# Patient Record
Sex: FEMALE | Race: WHITE | Employment: OTHER | URBAN - METROPOLITAN AREA
[De-identification: names, ages, dates, MRNs, and addresses within clinical notes are randomized per-mention and may not be internally consistent; named-entity substitution may affect disease eponyms.]

---

## 2017-03-15 ENCOUNTER — HOSPITAL ENCOUNTER (OUTPATIENT)
Dept: MAMMOGRAPHY | Age: 72
Discharge: HOME | End: 2017-03-15
Attending: FAMILY MEDICINE
Payer: MEDICARE

## 2017-03-15 DIAGNOSIS — Z12.31 VISIT FOR SCREENING MAMMOGRAM: ICD-10-CM

## 2017-03-15 PROCEDURE — 77067 SCR MAMMO BI INCL CAD: CPT

## 2017-07-21 ENCOUNTER — APPOINTMENT (OUTPATIENT)
Dept: ULTRASOUND IMAGING | Age: 72
End: 2017-07-21
Attending: PHYSICIAN ASSISTANT
Payer: MEDICARE

## 2017-07-21 ENCOUNTER — HOSPITAL ENCOUNTER (EMERGENCY)
Age: 72
Discharge: HOME | End: 2017-07-21
Attending: EMERGENCY MEDICINE
Payer: MEDICARE

## 2017-07-21 VITALS
OXYGEN SATURATION: 98 % | WEIGHT: 229.28 LBS | RESPIRATION RATE: 18 BRPM | DIASTOLIC BLOOD PRESSURE: 88 MMHG | SYSTOLIC BLOOD PRESSURE: 149 MMHG | HEART RATE: 69 BPM | TEMPERATURE: 98.2 F

## 2017-07-21 DIAGNOSIS — M79.605 LEG PAIN, INFERIOR, LEFT: Primary | ICD-10-CM

## 2017-07-21 DIAGNOSIS — T14.8XXA HEMATOMA: ICD-10-CM

## 2017-07-21 PROCEDURE — 93971 EXTREMITY STUDY: CPT

## 2017-07-21 PROCEDURE — 99282 EMERGENCY DEPT VISIT SF MDM: CPT

## 2017-07-21 RX ORDER — CHROMIUM PICOLINATE 200 MCG
TABLET ORAL
COMMUNITY
End: 2020-09-22

## 2017-07-21 RX ORDER — GLIMEPIRIDE 1 MG/1
2 TABLET ORAL
COMMUNITY

## 2017-07-21 RX ORDER — LOSARTAN POTASSIUM 50 MG/1
TABLET ORAL DAILY
COMMUNITY
End: 2022-10-24

## 2017-07-21 RX ORDER — GUAIFENESIN 100 MG/5ML
81 LIQUID (ML) ORAL DAILY
COMMUNITY

## 2017-07-21 RX ORDER — LOVASTATIN 20 MG/1
20 TABLET ORAL
COMMUNITY

## 2017-07-21 NOTE — ED PROVIDER NOTES
HPI Comments: Dc Valadez is a 80 y.o. female with no PMHx who presents ambulatory to the ED c/o mass to left lower leg with associated pain and swelling x three days. Pt reports visiting [facility] who referred pt to ED to check for blood clots. She specifically denies fever, chills, sore throat, rhinorrhea, cough, SOB, CP, HA, and N/V/D. Social hx: - Tobacco use, - EtOH use, - Illicit drug use    PCP: Sparkle Delaney MD    There are no other complaints, changes or physical findings at this time. The history is provided by the patient. No  was used. History reviewed. No pertinent past medical history. Past Surgical History:   Procedure Laterality Date    HX BREAST BIOPSY Left     40 yrs ago--neg         History reviewed. No pertinent family history. Social History     Social History    Marital status:      Spouse name: N/A    Number of children: N/A    Years of education: N/A     Occupational History    Not on file. Social History Main Topics    Smoking status: Not on file    Smokeless tobacco: Not on file    Alcohol use Not on file    Drug use: Not on file    Sexual activity: Not on file     Other Topics Concern    Not on file     Social History Narrative         ALLERGIES: Review of patient's allergies indicates not on file. Review of Systems   Constitutional: Negative. Negative for activity change, appetite change, chills, fatigue, fever and unexpected weight change. HENT: Negative. Negative for congestion, hearing loss, rhinorrhea, sneezing and voice change. Eyes: Negative. Negative for pain and visual disturbance. Respiratory: Negative. Negative for apnea, cough, choking, chest tightness and shortness of breath. Cardiovascular: Negative. Negative for chest pain and palpitations. Gastrointestinal: Negative. Negative for abdominal distention, abdominal pain, blood in stool, diarrhea, nausea and vomiting. Genitourinary: Negative. Negative for difficulty urinating, flank pain, frequency and urgency. No discharge   Musculoskeletal: Negative for arthralgias, back pain, myalgias and neck stiffness. Positive for mass to left lower leg with pain and swelling. Skin: Negative. Negative for color change and rash. Neurological: Negative. Negative for dizziness, seizures, syncope, speech difficulty, weakness, numbness and headaches. Hematological: Negative for adenopathy. Psychiatric/Behavioral: Negative. Negative for agitation, behavioral problems, dysphoric mood and suicidal ideas. The patient is not nervous/anxious. Vitals:    07/21/17 1008   BP: 149/88   Pulse: 69   Resp: 18   Temp: 98.2 °F (36.8 °C)   SpO2: 98%   Weight: 104 kg (229 lb 4.5 oz)            Physical Exam   Constitutional: She is oriented to person, place, and time. She appears well-developed and well-nourished. No distress. HENT:   Head: Normocephalic and atraumatic. Right Ear: External ear normal.   Left Ear: External ear normal.   Nose: Nose normal.   Mouth/Throat: Oropharynx is clear and moist. No oropharyngeal exudate. Eyes: Conjunctivae and EOM are normal. Pupils are equal, round, and reactive to light. Right eye exhibits no discharge. Left eye exhibits no discharge. No scleral icterus. Neck: Normal range of motion. Neck supple. No JVD present. No tracheal deviation present. Cardiovascular: Normal rate, regular rhythm, normal heart sounds and intact distal pulses. Exam reveals no gallop and no friction rub. No murmur heard. Pulmonary/Chest: Effort normal and breath sounds normal. No respiratory distress. She has no wheezes. She has no rales. She exhibits no tenderness. Abdominal: Soft. Bowel sounds are normal. She exhibits no distension and no mass. There is no tenderness. There is no rebound and no guarding. Musculoskeletal: Normal range of motion.    Tender hematoma to lateral aspect of left lower leg. Negative Rhodes's sign. No gross deformity. Lymphadenopathy:     She has no cervical adenopathy. Neurological: She is alert and oriented to person, place, and time. She has normal reflexes. No cranial nerve deficit. She exhibits normal muscle tone. Coordination normal.   Skin: Skin is warm and dry. She is not diaphoretic. Psychiatric: She has a normal mood and affect. Her behavior is normal. Judgment and thought content normal.   Nursing note and vitals reviewed. MDM  Number of Diagnoses or Management Options  Diagnosis management comments: DDx: DVT, hematoma, ecchymosis       Amount and/or Complexity of Data Reviewed  Tests in the radiology section of CPT®: reviewed and ordered  Review and summarize past medical records: yes    Patient Progress  Patient progress: stable    ED Course       Procedures      IMAGING RESULTS:    DUPLEX LOWER EXT VENOUS LEFT   Final Result     INDICATION:  sent by pmd for us r/o dvt     COMPARISON: None.     FINDINGS: Duplex Doppler sonography of the left lower extremity was performed  from the groin to the calf. The left common femoral, femoral and popliteal veins  are compressible with normal color-flow and wave forms and response to  physiologic maneuvers including Valsalva and augmentation.     IMPRESSION  IMPRESSION:  No deep venous thrombosis identified.                  MEDICATIONS GIVEN:  Medications - No data to display    IMPRESSION:  1. Leg pain, inferior, left    2. Hematoma        PLAN: Discharge home  1. Current Discharge Medication List        2. Follow-up Information     Follow up With Details Comments Contact Info    Donovan Encarnacion MD In 2 days As needed [address] [address]  [phone]          3. Return to ED if worse     DISCHARGE NOTE  11:32 AM  The patient has been re-evaluated and is ready for discharge. Reviewed available results with patient. Counseled pt on diagnosis and care plan.  Pt has expressed understanding, and all questions have been answered. Pt agrees with plan and agrees to F/U as recommended, or return to the ED if their sxs worsen. Discharge instructions have been provided and explained to the pt, along with reasons to return to the ED. This note is prepared by Patricia Pierce, acting as Scribe for [employer] STEPHANIE Petit: The scribe's documentation has been prepared under my direction and personally reviewed by me in its entirety. I confirm that the note above accurately reflects all work, treatment, procedures, and medical decision making performed by me.

## 2017-07-21 NOTE — ED NOTES
Discharge instructions reviewed with patient, copy given by [place], [place]. Pt is accomponied by family, denies use of wheelchair.

## 2017-07-21 NOTE — DISCHARGE INSTRUCTIONS
Bruises: Care Instructions  Your Care Instructions    Bruises occur when small blood vessels under the skin tear or rupture, most often from a twist, bump, or fall. Blood leaks into tissues under the skin and causes a black-and-blue spot that often turns colors, including purplish black, reddish blue, or yellowish green, as the bruise heals. Bruises hurt, but most are not serious and will go away on their own within 2 to 4 weeks. Sometimes, gravity causes them to spread down the body. A leg bruise usually will take longer to heal than a bruise on the face or arms. Follow-up care is a key part of your treatment and safety. Be sure to make and go to all appointments, and call your doctor if you are having problems. Its also a good idea to know your test results and keep a list of the medicines you take. How can you care for yourself at home? · Take pain medicines exactly as directed. ¨ If the doctor gave you a prescription medicine for pain, take it as prescribed. ¨ If you are not taking a prescription pain medicine, ask your doctor if you can take an over-the-counter medicine. · Put ice or a cold pack on the area for 10 to 20 minutes at a time. Put a thin cloth between the ice and your skin. · If you can, prop up the bruised area on pillows as much as possible for the next few days. Try to keep the bruise above the level of your heart. When should you call for help? Call your doctor now or seek immediate medical care if:  · You have signs of infection, such as:  ¨ Increased pain, swelling, warmth, or redness. ¨ Red streaks leading from the bruise. ¨ Pus draining from the bruise. ¨ A fever. · You have a bruise on your leg and signs of a blood clot, such as:  ¨ Increasing redness and swelling along with warmth, tenderness, and pain in the bruised area. ¨ Pain in your calf, back of the knee, thigh, or groin. ¨ Redness and swelling in your leg or groin. · Your pain gets worse.   Watch closely for changes in your health, and be sure to contact your doctor if:  · You do not get better as expected. Where can you learn more? Go to http://jody-ashley.info/. Enter [phone] in the search box to learn more about \"Bruises: Care Instructions. \"  Current as of: March 20, 2017  Content Version: 11.3  © 8320-5506 [employer]. Care instructions adapted under license by [employer] (which disclaims liability or warranty for this information). If you have questions about a medical condition or this instruction, always ask your healthcare professional. [place] [address] any warranty or liability for your use of this information.

## 2018-03-16 ENCOUNTER — HOSPITAL ENCOUNTER (OUTPATIENT)
Dept: MAMMOGRAPHY | Age: 73
Discharge: HOME | End: 2018-03-16
Attending: FAMILY MEDICINE
Payer: MEDICARE

## 2018-03-16 DIAGNOSIS — Z12.39 BREAST SCREENING: ICD-10-CM

## 2018-03-16 PROCEDURE — 77067 SCR MAMMO BI INCL CAD: CPT

## 2018-08-13 ENCOUNTER — HOSPITAL ENCOUNTER (OUTPATIENT)
Dept: CT IMAGING | Age: 73
Discharge: HOME | End: 2018-08-13
Attending: OTOLARYNGOLOGY
Payer: MEDICARE

## 2018-08-13 DIAGNOSIS — J32.9 CHRONIC SINUSITIS: ICD-10-CM

## 2018-08-13 PROCEDURE — 70486 CT MAXILLOFACIAL W/O DYE: CPT

## 2019-03-18 ENCOUNTER — HOSPITAL ENCOUNTER (OUTPATIENT)
Dept: MAMMOGRAPHY | Age: 74
Discharge: HOME | End: 2019-03-18
Attending: FAMILY MEDICINE
Payer: MEDICARE

## 2019-03-18 DIAGNOSIS — Z12.39 SCREENING BREAST EXAMINATION: ICD-10-CM

## 2019-03-18 PROCEDURE — 77067 SCR MAMMO BI INCL CAD: CPT

## 2020-06-04 ENCOUNTER — HOSPITAL ENCOUNTER (OUTPATIENT)
Dept: MAMMOGRAPHY | Age: 75
Discharge: HOME | End: 2020-06-04
Attending: FAMILY MEDICINE
Payer: MEDICARE

## 2020-06-04 DIAGNOSIS — Z12.31 VISIT FOR SCREENING MAMMOGRAM: ICD-10-CM

## 2020-06-04 PROCEDURE — 77067 SCR MAMMO BI INCL CAD: CPT

## 2020-09-19 ENCOUNTER — HOSPITAL ENCOUNTER (OUTPATIENT)
Dept: PREADMISSION TESTING | Age: 75
Discharge: HOME | End: 2020-09-19
Payer: MEDICARE

## 2020-09-19 PROCEDURE — 87635 SARS-COV-2 COVID-19 AMP PRB: CPT

## 2020-09-20 LAB
HEALTH STATUS, XMCV2T: NORMAL
SARS-COV-2, COV2NT: NOT DETECTED
SOURCE, COVRS: NORMAL
SPECIMEN SOURCE, FCOV2M: NORMAL
SPECIMEN TYPE, XMCV1T: NORMAL

## 2020-09-22 ENCOUNTER — ANESTHESIA EVENT (OUTPATIENT)
Dept: ENDOSCOPY | Age: 75
End: 2020-09-22
Payer: MEDICARE

## 2020-09-22 RX ORDER — FUROSEMIDE 20 MG/1
20 TABLET ORAL AS NEEDED
COMMUNITY

## 2020-09-23 ENCOUNTER — ANESTHESIA (OUTPATIENT)
Dept: ENDOSCOPY | Age: 75
End: 2020-09-23
Payer: MEDICARE

## 2020-09-23 ENCOUNTER — HOSPITAL ENCOUNTER (OUTPATIENT)
Age: 75
Setting detail: OUTPATIENT SURGERY
Discharge: HOME | End: 2020-09-23
Attending: INTERNAL MEDICINE | Admitting: INTERNAL MEDICINE
Payer: MEDICARE

## 2020-09-23 VITALS
WEIGHT: 241 LBS | BODY MASS INDEX: 41.15 KG/M2 | TEMPERATURE: 97.7 F | SYSTOLIC BLOOD PRESSURE: 131 MMHG | HEIGHT: 64 IN | DIASTOLIC BLOOD PRESSURE: 55 MMHG | HEART RATE: 61 BPM | OXYGEN SATURATION: 97 % | RESPIRATION RATE: 17 BRPM

## 2020-09-23 PROCEDURE — 77030013992 HC SNR POLYP ENDOSC BSC -B: Performed by: INTERNAL MEDICINE

## 2020-09-23 PROCEDURE — 2709999900 HC NON-CHARGEABLE SUPPLY: Performed by: INTERNAL MEDICINE

## 2020-09-23 PROCEDURE — 74011250636 HC RX REV CODE- 250/636: Performed by: REGISTERED NURSE

## 2020-09-23 PROCEDURE — 76060000031 HC ANESTHESIA FIRST 0.5 HR: Performed by: INTERNAL MEDICINE

## 2020-09-23 PROCEDURE — 88305 TISSUE EXAM BY PATHOLOGIST: CPT

## 2020-09-23 PROCEDURE — 76040000019: Performed by: INTERNAL MEDICINE

## 2020-09-23 PROCEDURE — 74011250636 HC RX REV CODE- 250/636: Performed by: INTERNAL MEDICINE

## 2020-09-23 PROCEDURE — 74011000250 HC RX REV CODE- 250: Performed by: REGISTERED NURSE

## 2020-09-23 RX ORDER — DEXTROMETHORPHAN/PSEUDOEPHED 2.5-7.5/.8
1.2 DROPS ORAL
Status: DISCONTINUED | OUTPATIENT
Start: 2020-09-23 | End: 2020-09-23 | Stop reason: HOSPADM

## 2020-09-23 RX ORDER — FLUMAZENIL 0.1 MG/ML
0.2 INJECTION INTRAVENOUS
Status: DISCONTINUED | OUTPATIENT
Start: 2020-09-23 | End: 2020-09-23 | Stop reason: HOSPADM

## 2020-09-23 RX ORDER — EPINEPHRINE 0.1 MG/ML
1 INJECTION INTRACARDIAC; INTRAVENOUS
Status: DISCONTINUED | OUTPATIENT
Start: 2020-09-23 | End: 2020-09-23 | Stop reason: HOSPADM

## 2020-09-23 RX ORDER — SODIUM CHLORIDE 9 MG/ML
25 INJECTION, SOLUTION INTRAVENOUS CONTINUOUS
Status: DISCONTINUED | OUTPATIENT
Start: 2020-09-23 | End: 2020-09-23 | Stop reason: HOSPADM

## 2020-09-23 RX ORDER — SODIUM CHLORIDE 0.9 % (FLUSH) 0.9 %
5-40 SYRINGE (ML) INJECTION AS NEEDED
Status: DISCONTINUED | OUTPATIENT
Start: 2020-09-23 | End: 2020-09-23 | Stop reason: HOSPADM

## 2020-09-23 RX ORDER — SODIUM CHLORIDE 0.9 % (FLUSH) 0.9 %
5-40 SYRINGE (ML) INJECTION EVERY 8 HOURS
Status: DISCONTINUED | OUTPATIENT
Start: 2020-09-23 | End: 2020-09-23 | Stop reason: HOSPADM

## 2020-09-23 RX ORDER — ATROPINE SULFATE 0.1 MG/ML
0.5 INJECTION INTRAVENOUS
Status: DISCONTINUED | OUTPATIENT
Start: 2020-09-23 | End: 2020-09-23 | Stop reason: HOSPADM

## 2020-09-23 RX ORDER — NALOXONE HYDROCHLORIDE 0.4 MG/ML
0.4 INJECTION, SOLUTION INTRAMUSCULAR; INTRAVENOUS; SUBCUTANEOUS
Status: DISCONTINUED | OUTPATIENT
Start: 2020-09-23 | End: 2020-09-23 | Stop reason: HOSPADM

## 2020-09-23 RX ORDER — PROPOFOL 10 MG/ML
INJECTION, EMULSION INTRAVENOUS AS NEEDED
Status: DISCONTINUED | OUTPATIENT
Start: 2020-09-23 | End: 2020-09-23 | Stop reason: HOSPADM

## 2020-09-23 RX ORDER — LIDOCAINE HYDROCHLORIDE 20 MG/ML
INJECTION, SOLUTION EPIDURAL; INFILTRATION; INTRACAUDAL; PERINEURAL AS NEEDED
Status: DISCONTINUED | OUTPATIENT
Start: 2020-09-23 | End: 2020-09-23 | Stop reason: HOSPADM

## 2020-09-23 RX ADMIN — PROPOFOL 70 MG: 10 INJECTION, EMULSION INTRAVENOUS at 09:18

## 2020-09-23 RX ADMIN — SODIUM CHLORIDE: 900 INJECTION, SOLUTION INTRAVENOUS at 09:00

## 2020-09-23 RX ADMIN — PROPOFOL 50 MG: 10 INJECTION, EMULSION INTRAVENOUS at 09:15

## 2020-09-23 RX ADMIN — PROPOFOL 30 MG: 10 INJECTION, EMULSION INTRAVENOUS at 09:26

## 2020-09-23 RX ADMIN — LIDOCAINE HYDROCHLORIDE 40 MG: 20 INJECTION, SOLUTION EPIDURAL; INFILTRATION; INTRACAUDAL; PERINEURAL at 09:04

## 2020-09-23 RX ADMIN — PROPOFOL 100 MG: 10 INJECTION, EMULSION INTRAVENOUS at 09:04

## 2020-09-23 NOTE — ANESTHESIA PREPROCEDURE EVALUATION
Relevant Problems   No relevant active problems       Anesthetic History   No history of anesthetic complications            Review of Systems / Medical History  Patient summary reviewed, nursing notes reviewed and pertinent labs reviewed    Pulmonary  Within defined limits                 Neuro/Psych   Within defined limits           Cardiovascular    Hypertension              Exercise tolerance: >4 METS     GI/Hepatic/Renal  Within defined limits           Pertinent negatives: No liver disease and renal disease   Endo/Other    Diabetes         Other Findings              Physical Exam    Airway  Mallampati: I  TM Distance: 4 - 6 cm  Neck ROM: normal range of motion, short neck        Cardiovascular  Regular rate and rhythm,  S1 and S2 normal,  no murmur, click, rub, or gallop             Dental    Dentition: Full upper dentures and Full lower dentures     Pulmonary  Breath sounds clear to auscultation               Abdominal  GI exam deferred       Other Findings            Anesthetic Plan    ASA: 2  Anesthesia type: total IV anesthesia          Induction: Intravenous  Anesthetic plan and risks discussed with: Patient

## 2020-09-23 NOTE — PROCEDURES
NAME:  Graham Whittington   :   1945   MRN:   [id number]     Date/Time:  2020 8:33 AM    Colonoscopy Operative Report    Procedure Type:  Colonoscopy with polypectomy (snare cautery)     Indications: family hx of colon polyps (her last colonoscopy 10 years ago)  Pre-operative Diagnosis: see indication above  Post-operative Diagnosis:  See findings below  :  Krystal Chavez MD  Referring Provider: Parveen [place] MD    Exam:  Airway: clear, no airway problems anticipated  Heart: RRR, without gallops or rubs  Lungs: clear bilaterally without wheezes, crackles, or rhonchi  Abdomen: soft, nontender, nondistended, bowel sounds present  Mental Status: awake, alert and oriented to person, place and time    Sedation:  MAC anesthesia Propofol  Procedure Details:  After informed consent was obtained with all risks and benefits of procedure explained and preoperative exam completed, the patient was taken to the endoscopy suite and placed in the left lateral decubitus position. Upon sequential sedation as per above, a digital rectal exam was performed demonstrating internal and external hemorrhoids. The Olympus videocolonoscope  was inserted in the rectum and carefully advanced to the cecum, which was identified by the ileocecal valve and appendiceal orifice. The quality of preparation was fair. The colonoscope was slowly withdrawn with careful evaluation between folds. Retroflexion in the rectum was completed demonstrating internal and external hemorrhoids. Findings:   ANUS: Anal exam reveals no masses but hemorrhoids, sphincter tone is normal.   RECTUM: Rectal exam reveals no masses but hemorrhoids. SIGMOID COLON: severe diverticulosis, otherwise normal.  DESCENDING COLON: severe diverticulosis, otherwise normal.  TRANSVERSE COLON:   -- 6 sessile colon polyps, sized between 0.3 cm and 0.8 cm, removed with hot snare cautery  -- moderate diverticulosis.   ASCENDING COLON: Moderate diverticulosis, otherwise normal  CECUM: The appendiceal orifice appears normal. The ileocecal valve appears normal.   TERMINAL ILEUM: The terminal ileum was not entered. Specimen Removed:  Transverse colon polyps  Complications:  None. EBL:     None. Impression:  -- 6 colon polyps, removed as above  -- pan-colonic diverticulosis  -- family history of colon polyps  -- internal and external hemorrhoids    Recommendations:   -- await pathology results  -- avoid NSAIDs x 14 days  -- high fiber diet  -- repeat colonoscopy in 2 years    Discharge Disposition:  Home in the company of a  when able to ambulate.       Vernestine Goldberg, MD

## 2020-09-23 NOTE — PERIOP NOTES
Blade Ovalle  1945  [id number]    Situation:  Verbal report received from: Jose Groves  Procedure: Procedure(s):  COLONOSCOPY  ENDOSCOPIC POLYPECTOMY    Background:    Preoperative diagnosis: HISTORY OF COLON POLYPS  Postoperative diagnosis: Diverticulosis, Colon Polyps, Hemhorroids    :  Dr. Norma Green  Assistant(s): Endoscopy Technician-1: Rory Davies  Endoscopy RN-1: Waldemar Tiwari    Specimens:   ID Type Source Tests Collected by Time Destination   1 : Transverse Colon Polyps Preservative Colon, Transverse  Ilia Merino MD 9/23/2020 0376 Pathology     H. Pylori  no    Assessment:  Intra-procedure medications   Anesthesia gave intra-procedure sedation and medications, see anesthesia flow sheet yes    Intravenous fluids: NS@ KVO     Vital signs stable     Abdominal assessment: round and soft     Recommendation:  Discharge patient per MD order.   Family or Friend   Permission to share finding with family or friend yes

## 2020-09-23 NOTE — DISCHARGE INSTRUCTIONS
Nawaf Newell  [id number]  1945    COLON DISCHARGE INSTRUCTIONS  Discomfort:  Redness at IV site- apply warm compress to area; if redness or soreness persist- contact your physician  There may be a slight amount of blood passed from the rectum  Gaseous discomfort- walking, belching will help relieve any discomfort  You may not operate a vehicle for 12 hours  You may not engage in an occupation involving machinery or appliances for rest of today  You may not drink alcoholic beverages for at least 12 hours  Avoid making any critical decisions for at least 24 hour  DIET:   High fiber diet. - however -  remember your colon is empty and a heavy meal will produce gas. Avoid these foods:  vegetables, fried / greasy foods, carbonated drinks for today  MEDICATION:  (See attached)     ACTIVITY:  You may not resume your normal daily activities until tomorrow AM; it is recommended that you spend the remainder of the day resting -  avoid any strenuous activity. CALL M.D. ANY SIGN OF:   Increasing pain, nausea, vomiting  Abdominal distension (swelling)  New increased bleeding (oral or rectal)  Fever (chills)  Pain in chest area  Bloody discharge from nose or mouth  Shortness of breath    You should NOT take any Advil, Aspirin, Ibuprofen, Motrin, Aleve, or Goodys for 14 days, ONLY  Tylenol as needed for pain. IMPRESSION:  -- SIX total colon polyps, all removed today. -- diverticulosis, which is when small out-pouchings in the inner lining of the colon form, little stretched out pockets. This is very common, and a diet high in fiber with the addition of a daily fiber supplement (like 2 teaspoons of metamucil or psyllium husk fiber powder mixed in water) can help treat this! -- we saw some hemorrhoids, which are very common, and these are swollen veins at the anal canal or end of the rectum, which can bulge with constipation and straining or frequent bowel movements.  Sometimes they cause bleeding, burning, pressure, or even pain. A diet high in fiber helps, but using a daily fiber supplement (like 2 teaspoons of psyllium husk powder or metamucil) can help treat these.     Follow-up Instructions:   Call Dr. Hyacinth Herrera for the results of procedure / biopsy in 7-10 days  Telephone # [phone]  Repeat colonoscopy in 2 years    Iván Gimenez MD

## 2020-09-23 NOTE — ANESTHESIA POSTPROCEDURE EVALUATION
Procedure(s):  COLONOSCOPY  ENDOSCOPIC POLYPECTOMY. total IV anesthesia    Anesthesia Post Evaluation      Multimodal analgesia: multimodal analgesia used between 6 hours prior to anesthesia start to PACU discharge  Patient location during evaluation: bedside  Patient participation: complete - patient participated  Level of consciousness: awake  Pain management: adequate  Airway patency: patent  Anesthetic complications: no  Cardiovascular status: acceptable  Respiratory status: acceptable  Hydration status: acceptable  Post anesthesia nausea and vomiting:  controlled  Final Post Anesthesia Temperature Assessment:  Normothermia (36.0-37.5 degrees C)      INITIAL Post-op Vital signs:   Vitals Value Taken Time   /68 9/23/2020 10:06 AM   Temp 36.5 °C (97.7 °F) 9/23/2020  9:40 AM   Pulse 55 9/23/2020 10:06 AM   Resp 15 9/23/2020 10:06 AM   SpO2 97 % 9/23/2020 10:06 AM   Vitals shown include unvalidated device data.

## 2020-09-23 NOTE — H&P
Gastroenterology Outpatient History and Physical    Patient: Clay Rodriguez    Physician: Jacob Hawley MD    Chief Complaint: family hx of colon polyps  History of Present Illness: 70 yo F with family history of colon polyps    History:  Past Medical History:   Diagnosis Date    Diabetes ([address]     Hypertension       Past Surgical History:   Procedure Laterality Date    HX BREAST BIOPSY Left     36 yrs ago--neg    HX CATARACT REMOVAL Bilateral     HX HEENT      upper lid lift    HX ORTHOPAEDIC Bilateral     carpal tunnel release    HX OTHER SURGICAL      colonoscopy      Social History     Socioeconomic History    Marital status:      Spouse name: Not on file    Number of children: Not on file    Years of education: Not on file    Highest education level: Not on file   Tobacco Use    Smoking status: Former Smoker     Packs/day: 1.00     Years: 25.00     Pack years: 25.00     Last attempt to quit: 1999     Years since quittin.0    Smokeless tobacco: Never Used   Substance and Sexual Activity    Alcohol use: Never     Frequency: Never     Comment: none in 30 years    Drug use: Never      Family History   Problem Relation Age of Onset    Diabetes Mother     Hypertension Mother     Hypertension Father     Other Father         cerebral aneurysm    Diabetes Brother     Hypertension Brother     Lung Disease Brother     Diabetes Sister     There is no problem list on file for this patient. Allergies: Allergies   Allergen Reactions    Bactrim [Sulfamethoprim] Hives and Itching     Medications:   Prior to Admission medications    Medication Sig Start Date End Date Taking? Authorizing Provider   furosemide (Lasix) 20 mg tablet Take 20 mg by mouth as needed. Yes Provider, Law   losartan (COZAAR) 50 mg tablet Take  by mouth daily. Yes Other, MD Mare   lovastatin (MEVACOR) 20 mg tablet Take 20 mg by mouth nightly.    Yes Other, MD Mare   glimepiride (AMARYL) 1 mg tablet Take 2 mg by mouth every morning. Yes Francisco J, MD Mare   aspirin 81 mg chewable tablet Take 81 mg by mouth daily. Yes Francisco J, MD Mare   DOCOSAHEXANOIC ACID/EPA (FISH OIL PO) Take 1 Cap by mouth two (2) times a day. Yes Francisco J, MD Mare     Physical Exam:   Vital Signs: There were no vitals taken for this visit.   General: well developed, well nourished   HEENT: unremarkable   Heart: regular rhythm no mumur    Lungs: clear   Abdominal:  benign   Neurological: unremarkable   Extremities: no edema     Findings/Diagnosis: family hx of colon polyps  Plan of Care/Planned Procedure: colonoscopy with conscious/deep sedation    Signed:  Bentley Brumfield MD 9/23/2020

## 2020-09-23 NOTE — PROGRESS NOTES
Endoscope was pre-cleaned at the bedside immediately following procedure by Tyler Black. Anesthesia reports 250mg Propofol, 40mg Lidocaine and 500mL NS given during procedure. Received report from anesthesia staff on vital signs and status of patient.

## 2020-10-16 ENCOUNTER — TRANSCRIBE ORDER (OUTPATIENT)
Dept: SCHEDULING | Age: 75
End: 2020-10-16

## 2020-10-22 ENCOUNTER — HOSPITAL ENCOUNTER (OUTPATIENT)
Dept: ULTRASOUND IMAGING | Age: 75
Discharge: HOME | End: 2020-10-22
Attending: FAMILY MEDICINE
Payer: MEDICARE

## 2020-10-22 PROCEDURE — 76700 US EXAM ABDOM COMPLETE: CPT

## 2021-05-13 ENCOUNTER — TRANSCRIBE ORDER (OUTPATIENT)
Dept: SCHEDULING | Age: 76
End: 2021-05-13

## 2021-05-13 DIAGNOSIS — Z12.31 VISIT FOR SCREENING MAMMOGRAM: Primary | ICD-10-CM

## 2021-06-08 ENCOUNTER — HOSPITAL ENCOUNTER (OUTPATIENT)
Dept: MAMMOGRAPHY | Age: 76
Discharge: HOME | End: 2021-06-08
Attending: FAMILY MEDICINE
Payer: MEDICARE

## 2021-06-08 DIAGNOSIS — Z12.31 VISIT FOR SCREENING MAMMOGRAM: ICD-10-CM

## 2021-06-08 PROCEDURE — 77063 BREAST TOMOSYNTHESIS BI: CPT

## 2022-05-16 ENCOUNTER — TRANSCRIBE ORDER (OUTPATIENT)
Dept: SCHEDULING | Age: 77
End: 2022-05-16

## 2022-05-16 DIAGNOSIS — Z12.31 SCREENING MAMMOGRAM FOR HIGH-RISK PATIENT: Primary | ICD-10-CM

## 2022-06-10 ENCOUNTER — HOSPITAL ENCOUNTER (OUTPATIENT)
Dept: MAMMOGRAPHY | Age: 77
Discharge: HOME | End: 2022-06-10
Attending: FAMILY MEDICINE
Payer: MEDICARE

## 2022-06-10 DIAGNOSIS — Z12.31 SCREENING MAMMOGRAM FOR HIGH-RISK PATIENT: ICD-10-CM

## 2022-06-10 PROCEDURE — 77063 BREAST TOMOSYNTHESIS BI: CPT

## 2022-09-29 RX ORDER — METFORMIN HYDROCHLORIDE 1000 MG/1
1000 TABLET ORAL 2 TIMES DAILY WITH MEALS
COMMUNITY

## 2022-09-30 ENCOUNTER — ANESTHESIA EVENT (OUTPATIENT)
Dept: ENDOSCOPY | Age: 77
End: 2022-09-30
Payer: MEDICARE

## 2022-09-30 ENCOUNTER — ANESTHESIA (OUTPATIENT)
Dept: ENDOSCOPY | Age: 77
End: 2022-09-30
Payer: MEDICARE

## 2022-09-30 ENCOUNTER — HOSPITAL ENCOUNTER (OUTPATIENT)
Age: 77
Setting detail: OUTPATIENT SURGERY
Discharge: HOME | End: 2022-09-30
Attending: INTERNAL MEDICINE | Admitting: INTERNAL MEDICINE
Payer: MEDICARE

## 2022-09-30 VITALS
RESPIRATION RATE: 19 BRPM | BODY MASS INDEX: 40.36 KG/M2 | OXYGEN SATURATION: 97 % | DIASTOLIC BLOOD PRESSURE: 67 MMHG | HEART RATE: 74 BPM | WEIGHT: 227.8 LBS | HEIGHT: 63 IN | TEMPERATURE: 98.9 F | SYSTOLIC BLOOD PRESSURE: 167 MMHG

## 2022-09-30 PROCEDURE — 74011000250 HC RX REV CODE- 250: Performed by: ANESTHESIOLOGY

## 2022-09-30 PROCEDURE — 76040000019: Performed by: INTERNAL MEDICINE

## 2022-09-30 PROCEDURE — 74011250636 HC RX REV CODE- 250/636: Performed by: INTERNAL MEDICINE

## 2022-09-30 PROCEDURE — 74011250637 HC RX REV CODE- 250/637: Performed by: INTERNAL MEDICINE

## 2022-09-30 PROCEDURE — 76060000031 HC ANESTHESIA FIRST 0.5 HR: Performed by: INTERNAL MEDICINE

## 2022-09-30 PROCEDURE — 2709999900 HC NON-CHARGEABLE SUPPLY: Performed by: INTERNAL MEDICINE

## 2022-09-30 PROCEDURE — 74011250636 HC RX REV CODE- 250/636: Performed by: ANESTHESIOLOGY

## 2022-09-30 RX ORDER — NALOXONE HYDROCHLORIDE 0.4 MG/ML
0.4 INJECTION, SOLUTION INTRAMUSCULAR; INTRAVENOUS; SUBCUTANEOUS
Status: DISCONTINUED | OUTPATIENT
Start: 2022-09-30 | End: 2022-09-30 | Stop reason: HOSPADM

## 2022-09-30 RX ORDER — DIPHENHYDRAMINE HYDROCHLORIDE 50 MG/ML
50 INJECTION, SOLUTION INTRAMUSCULAR; INTRAVENOUS ONCE
Status: DISCONTINUED | OUTPATIENT
Start: 2022-09-30 | End: 2022-09-30 | Stop reason: HOSPADM

## 2022-09-30 RX ORDER — CALCIUM CARBONATE/VITAMIN D3 600 MG-125
TABLET ORAL DAILY
COMMUNITY

## 2022-09-30 RX ORDER — ATROPINE SULFATE 0.1 MG/ML
0.5 INJECTION INTRAVENOUS
Status: DISCONTINUED | OUTPATIENT
Start: 2022-09-30 | End: 2022-09-30 | Stop reason: HOSPADM

## 2022-09-30 RX ORDER — DEXTROMETHORPHAN/PSEUDOEPHED 2.5-7.5/.8
1.2 DROPS ORAL
Status: DISCONTINUED | OUTPATIENT
Start: 2022-09-30 | End: 2022-09-30 | Stop reason: HOSPADM

## 2022-09-30 RX ORDER — EPINEPHRINE 0.1 MG/ML
1 INJECTION INTRACARDIAC; INTRAVENOUS
Status: DISCONTINUED | OUTPATIENT
Start: 2022-09-30 | End: 2022-09-30 | Stop reason: HOSPADM

## 2022-09-30 RX ORDER — PROPOFOL 10 MG/ML
INJECTION, EMULSION INTRAVENOUS AS NEEDED
Status: DISCONTINUED | OUTPATIENT
Start: 2022-09-30 | End: 2022-09-30 | Stop reason: HOSPADM

## 2022-09-30 RX ORDER — LIDOCAINE HYDROCHLORIDE 20 MG/ML
INJECTION, SOLUTION EPIDURAL; INFILTRATION; INTRACAUDAL; PERINEURAL AS NEEDED
Status: DISCONTINUED | OUTPATIENT
Start: 2022-09-30 | End: 2022-09-30 | Stop reason: HOSPADM

## 2022-09-30 RX ORDER — FLUMAZENIL 0.1 MG/ML
0.2 INJECTION INTRAVENOUS
Status: DISCONTINUED | OUTPATIENT
Start: 2022-09-30 | End: 2022-09-30 | Stop reason: HOSPADM

## 2022-09-30 RX ORDER — SODIUM CHLORIDE 9 MG/ML
125 INJECTION, SOLUTION INTRAVENOUS CONTINUOUS
Status: DISCONTINUED | OUTPATIENT
Start: 2022-09-30 | End: 2022-09-30 | Stop reason: HOSPADM

## 2022-09-30 RX ORDER — MIDAZOLAM HYDROCHLORIDE 1 MG/ML
.25-5 INJECTION, SOLUTION INTRAMUSCULAR; INTRAVENOUS
Status: DISCONTINUED | OUTPATIENT
Start: 2022-09-30 | End: 2022-09-30 | Stop reason: HOSPADM

## 2022-09-30 RX ADMIN — SODIUM CHLORIDE 125 ML/HR: 9 INJECTION, SOLUTION INTRAVENOUS at 13:19

## 2022-09-30 RX ADMIN — LIDOCAINE HYDROCHLORIDE 40 MG: 20 INJECTION, SOLUTION EPIDURAL; INFILTRATION; INTRACAUDAL; PERINEURAL at 13:22

## 2022-09-30 RX ADMIN — SIMETHICONE 80 MG: 20 SUSPENSION/ DROPS ORAL at 13:35

## 2022-09-30 RX ADMIN — PROPOFOL 220 MG: 10 INJECTION, EMULSION INTRAVENOUS at 13:44

## 2022-09-30 NOTE — PERIOP NOTES
Endoscopy Case End Note:    9040:  Procedure scope was pre-cleaned, per protocol, at bedside by MONICA ALFARO      0390:  Report received from anesthesia - Dr. Becky Gross. See anesthesia flowsheet for intra-procedure vital signs and events.

## 2022-09-30 NOTE — ANESTHESIA POSTPROCEDURE EVALUATION
Procedure(s):  COLONOSCOPY.    total IV anesthesia    Anesthesia Post Evaluation        Patient location during evaluation: PACU  Note status: Adequate. Level of consciousness: responsive to verbal stimuli and sleepy but conscious  Pain management: satisfactory to patient  Airway patency: patent  Anesthetic complications: no  Cardiovascular status: acceptable  Respiratory status: acceptable  Hydration status: acceptable  Comments: +Post-Anesthesia Evaluation and Assessment    Patient: Dunia Crawley MRN: [id number]  SSN: [id number]   YOB: 1945  Age: 77 y.o. Sex: female      Cardiovascular Function/Vital Signs    BP (!) 167/67   Pulse 74   Temp 37.2 °C (98.9 °F)   Resp 19   Ht 5' 3\" (1.6 m)   Wt 103.3 kg (227 lb 12.8 oz)   SpO2 97%   Breastfeeding No   BMI 40.35 kg/m²     Patient is status post Procedure(s):  COLONOSCOPY. Nausea/Vomiting: Controlled. Postoperative hydration reviewed and adequate. Pain:  Pain Scale 1: Numeric (0 - 10) (09/30/22 1356)  Pain Intensity 1: 0 (09/30/22 1356)   Managed. Neurological Status: At baseline. Mental Status and Level of Consciousness: Arousable. Pulmonary Status:   O2 Device: None (Room air) (09/30/22 1356)   Adequate oxygenation and airway patent. Complications related to anesthesia: None    Post-anesthesia assessment completed. No concerns. Signed By: Cliff Murry DO    9/30/2022  Post anesthesia nausea and vomiting:  controlled      INITIAL Post-op Vital signs:   Vitals Value Taken Time   /67 09/30/22 1411   Temp 37.2 °C (98.9 °F) 09/30/22 1356   Pulse 69 09/30/22 1410   Resp 22 09/30/22 1409   SpO2 96 % 09/30/22 1409   Vitals shown include unvalidated device data.

## 2022-09-30 NOTE — PROCEDURES
Colonoscopy Procedure Note    Indications:   See Preoperative Diagnosis above  Referring Physician: Hamlet Thompson MD  Anesthesia/Sedation: MAC anesthesia Propofol  Endoscopist:  Dr. Sarah Harrington  Assistant:  Endoscopy Technician-1: Jeannie Rm  Endoscopy RN-1: Mamie Duverney C  Preoperative diagnosis: Personal history of colonic polyps [Z86.010]  Postoperative diagnosis: Diverticulosis    Procedure in Detail:  Informed consent was obtained for the procedure, including sedation. Risks of perforation, hemorrhage, adverse drug reaction, and aspiration were discussed. The patient was placed in the left lateral decubitus position. Based on the pre-procedure assessment, including review of the patient's medical history, medications, allergies, and review of systems, she had been deemed to be an appropriate candidate for moderate sedation; she was therefore sedated with the medications listed above. The patient was monitored continuously with ECG tracing, pulse oximetry, blood pressure monitoring, and direct observations. A rectal examination was performed. The [id number] was inserted into the rectum and advanced under direct vision to the cecum, which was identified by the ileocecal valve and appendiceal orifice. The quality of the colonic preparation was good BPS 2/3/3 8. A careful inspection was made as the colonoscope was withdrawn, including a retroflexed view of the rectum; findings and interventions are described below. Appropriate photodocumentation was obtained.     Findings:  ARGELIA: unremarkable  Rectum: normal  no mucosal lesion appreciated  Sigmoid: diverticulosis, otherwise unremarkable  Descending Colon: normal  no mucosal lesion appreciated  Transverse Colon: normal  no mucosal lesion appreciated  Ascending Colon: normal  no mucosal lesion appreciated  Cecum: normal  no mucosal lesion appreciated  Terminal Ileum: not intubated    Specimens:    none    EBL: None    Complications: None; patient tolerated the procedure well. Recommendations:     - Repeat colonoscopy NOT recommended unless very healthy, next would be in 10 years when she is 81years old. - attempted to call [facility] [phone] no answer, no VM left due to no personalized VM message.       Signed By: Quita Diaz MD                        September 30, 2022

## 2022-09-30 NOTE — ANESTHESIA PREPROCEDURE EVALUATION
Relevant Problems   No relevant active problems       Anesthetic History   No history of anesthetic complications            Review of Systems / Medical History  Patient summary reviewed, nursing notes reviewed and pertinent labs reviewed    Pulmonary  Within defined limits                 Neuro/Psych   Within defined limits           Cardiovascular    Hypertension              Exercise tolerance: >4 METS     GI/Hepatic/Renal  Within defined limits           Pertinent negatives: No liver disease and renal disease   Endo/Other    Diabetes    Obesity     Other Findings              Physical Exam    Airway  Mallampati: I  TM Distance: 4 - 6 cm  Neck ROM: normal range of motion, short neck        Cardiovascular  Regular rate and rhythm,  S1 and S2 normal,  no murmur, click, rub, or gallop             Dental    Dentition: Full upper dentures and Full lower dentures     Pulmonary  Breath sounds clear to auscultation               Abdominal  GI exam deferred       Other Findings            Anesthetic Plan    ASA: 3  Anesthesia type: total IV anesthesia and general          Induction: Intravenous  Anesthetic plan and risks discussed with: Patient      Propofol MAC/Supernova prn.

## 2022-09-30 NOTE — H&P
[facility]  Outpatient History and Physical    Patient: Ariadne Lobo    Physician: Estefany Glover MD    Vital Signs: not currently breastfeeding. Allergies: Allergies   Allergen Reactions    Bactrim [Sulfamethoprim] Hives and Itching       Chief Complaint: high risk screening personal hx/o colon polyps. History:  Past Medical History:   Diagnosis Date    Diabetes ([address]     Hypertension       Past Surgical History:   Procedure Laterality Date    COLONOSCOPY N/A 2020    COLONOSCOPY performed by Sabine Orourke MD at [facility] ENDOSCOPY    COLONOSCOPY,ASHANTI AVINA,SNARE  2020         HX BREAST BIOPSY Left     40 yrs ago--neg    HX CATARACT REMOVAL Bilateral     HX HEENT      upper lid lift    HX ORTHOPAEDIC Bilateral     carpal tunnel release    HX OTHER SURGICAL      colonoscopy      Social History     Socioeconomic History    Marital status:    Tobacco Use    Smoking status: Former     Packs/day: 1.00     Years: 25.00     Pack years: 25.00     Types: Cigarettes     Quit date: 1999     Years since quittin.0    Smokeless tobacco: Never   Substance and Sexual Activity    Alcohol use: Never     Comment: none in 30 years    Drug use: Never      Family History   Problem Relation Age of Onset    Diabetes Mother     Hypertension Mother     Hypertension Father     Other Father         cerebral aneurysm    Diabetes Brother     Hypertension Brother     Lung Disease Brother     Diabetes Sister        Medications:   Prior to Admission medications    Medication Sig Start Date End Date Taking? Authorizing Provider   metFORMIN (GLUCOPHAGE) 1,000 mg tablet Take 1,000 mg by mouth two (2) times daily (with meals). Yes Provider, Historical   furosemide (LASIX) 20 mg tablet Take 20 mg by mouth as needed. Yes Provider, Historical   losartan (COZAAR) 50 mg tablet Take  by mouth daily. Yes Other, MD Mare   lovastatin (MEVACOR) 20 mg tablet Take 20 mg by mouth nightly. Yes Francisco J, MD Mare   glimepiride (AMARYL) 1 mg tablet Take 2 mg by mouth every morning. Yes Mare Marx MD   aspirin 81 mg chewable tablet Take 81 mg by mouth daily. Yes Mare Marx MD   DOCOSAHEXANOIC ACID/EPA (FISH OIL PO) Take 1 Cap by mouth two (2) times a day. Yes Mare Marx MD   calcium-cholecalciferol, d3, (CALCIUM 600 + D) 600-125 mg-unit tab Take  by mouth daily. Provider, Historical       Physical Exam:   General: alert, no distress   HEENT: Head: Normocephalic, no lesions, without obvious abnormality. Heart: regular rate and rhythm, S1, S2 normal, no murmur, click, rub or gallop   Lungs: chest clear, no wheezing, rales, normal symmetric air entry   Abdominal: Bowel sounds are normal, liver is not enlarged, spleen is not enlarged   Neurological: Grossly normal   Extremities: extremities normal, atraumatic, no cyanosis or edema     Plan of Care/Planned Procedure: colonoscopy    The heart, lungs and mental status were satisfactory for the administration of MAC sedation and for the procedure. Informed consent was obtained for the procedure, including sedation. Risks of perforation, hemorrhage, adverse drug reaction, and aspiration were discussed. The risks, benefits and alternatives were again reiterated to the patient to include the risk of infection, bleeding, medication reaction, aspiration, perforation which could require immediate surgery, cardiopulmonary complication, issues with anesthesia and death. The patient was counseled at length about the risks of bal Covid-19 in the maynor-operative and post-operative states including the recovery window of their procedure. The patient was made aware that bal Covid-19 after a surgical procedure may worsen their prognosis for recovering from the virus and lend to a higher morbidity and or mortality risk. The patient was given the options of postponing their procedure.  All of the risks, benefits, and alternatives were discussed. The patient does  wish to proceed with the procedure.

## 2022-09-30 NOTE — DISCHARGE INSTRUCTIONS
Emily Round  [id number]  1945    It was my pleasure seeing you for your procedure. You will also receive a summary report with the findings from this procedure and any further recommendations. If you had polyps removed or biopsies taken during your procedure, you will receive a separate letter from me within the next 2 weeks. If you don't receive this letter or if you have any questions, please call my office [phone]. Please take note of the post procedure instructions listed below. Best Wishes,    Dr. Gamal Contreras    These instructions give you information on caring for yourself after your procedure. Call your doctor if you have any problems or questions after your procedure. HOME CARE  Walk if you have belly cramping or gas. Walking will help get rid of the air and reduce the bloated feeling in your belly (abdomen). Your IV site (where you received drugs) may be tender to touch. Place warm towels on the site; keep your arm up on two pillows if you have any swelling or soreness in the area. You may shower. ACTIVITY:  Take frequent rest periods and move at a slower pace for the next 24 hours. .  You may resume your regular activity tomorrow if you are feeling back to normal.  Do not drive or ride a bicycle for at least 24 hours (because of the medicine (anesthesia) used during the test). Do not sign any important legal documents or use or operate any machinery for 24 hours  Do not take sleeping medicines/nerve drugs for 24 hours unless the doctor tells you. You can return to work/school tomorrow unless otherwise instructed. NUTRITION:  Drink plenty of fluids to keep your pee (urine) clear or pale yellow  Begin with a light meal and progress to your normal diet. Heavy or fried foods are harder to digest and may make you feel sick to your stomach (nauseated).   Once you are feeling back to normal, you may resume your normal diet as instructed by your doctor. Avoid alcoholic beverages for 24 hours or as instructed. IF YOU HAD BIOPSIES TAKEN OR POLYPS REMOVED DURING THE PROCEDURE:  For the next 7 days, avoid all non-steroidal antiinflammatory medications such as Ibuprofen, Motrin, Advil, Alleve, Ladonna-seltzer, Goody's powder, BC powder. If you do not have an heart condition that requires you to take a daily aspirin, you should avoid taking aspirin for 7 days. Eat a soft diet for 24 hours. Monitor your stools for any blood or dark black, tar-like, stools as this may be a sign of bleeding and if you see any blood, notify your doctor immediately. GET HELP RIGHT AWAY AND SEEK IMMEDIATE MEDICAL CARE IF:  You have more than a spotting of blood in your stool. You pass clumps of tissue (blood clots) or fill the toilet with blood. Your belly is painfully swollen or puffy (abdominal distention). You throw up (vomit). You have a fever. You have redness, pain or swelling at the IV site that last greater than two days. You have abdominal pain or discomfort that is severe or gets worse throughout the day. Post-procedure diagnosis:  Diverticulosis    Post-procedure recommendations:          Learning About Coronavirus (COVID-19)  Coronavirus [phone]): Overview  What is coronavirus (COVID-19)? The coronavirus disease (COVID-19) is caused by a virus. It is an illness that was first found in [place], [place], in December 2019. It has since spread worldwide. The virus can cause fever, cough, and trouble breathing. In severe cases, it can cause pneumonia and make it hard to breathe without help. It can cause death. Coronaviruses are a large group of viruses. They cause the common cold. They also cause more serious illnesses like Middle East respiratory syndrome (MERS) and severe acute respiratory syndrome (SARS). COVID-19 is caused by a novel coronavirus. That means it's a new type that has not been seen in people before.   This virus spreads person-to-person through droplets from coughing and sneezing. It can also spread when you are close to someone who is infected. And it can spread when you touch something that has the virus on it, such as a doorknob or a tabletop. What can you do to protect yourself from coronavirus (COVID-19)? The best way to protect yourself from getting sick is to: Avoid areas where there is an outbreak. Avoid contact with people who may be infected. Wash your hands often with soap or alcohol-based hand sanitizers. Avoid crowds and try to stay at least 6 feet away from other people. Wash your hands often, especially after you cough or sneeze. Use soap and water, and scrub for at least 20 seconds. If soap and water aren't available, use an alcohol-based hand . Avoid touching your mouth, nose, and eyes. What can you do to avoid spreading the virus to others? To help avoid spreading the virus to others:  Cover your mouth with a tissue when you cough or sneeze. Then throw the tissue in the trash. Use a disinfectant to clean things that you touch often. Stay home if you are sick or have been exposed to the virus. Don't go to school, work, or public areas. And don't use public transportation. If you are sick:  Leave your home only if you need to get medical care. But call the doctor's office first so they know you're coming. And wear a face mask, if you have one. If you have a face mask, wear it whenever you're around other people. It can help stop the spread of the virus when you cough or sneeze. Clean and disinfect your home every day. Use household  and disinfectant wipes or sprays. Take special care to clean things that you grab with your hands. These include doorknobs, remote controls, phones, and handles on your refrigerator and microwave. And don't forget countertops, tabletops, bathrooms, and computer keyboards.   When to call for help  Call 911 anytime you think you may need emergency care. For example, call if:  You have severe trouble breathing. (You can't talk at all.)  You have constant chest pain or pressure. You are severely dizzy or lightheaded. You are confused or can't think clearly. Your face and lips have a blue color. You pass out (lose consciousness) or are very hard to wake up. Call your doctor now if you develop symptoms such as:  Shortness of breath. Fever. Cough. If you need to get care, call ahead to the doctor's office for instructions before you go. Make sure you wear a face mask, if you have one, to prevent exposing other people to the virus. Where can you get the latest information? The following health organizations are tracking and studying this virus. Their websites contain the most up-to-date information. Karena Zavalaabigail also learn what to do if you think you may have been exposed to the virus. U.S. Centers for Disease Control and Prevention (CDC): The CDC provides updated news about the disease and travel advice. The website also tells you how to prevent the spread of infection. www.cdc.gov  World Health Organization [facility]): WHO offers information about the virus outbreaks. WHO also has travel advice. www.who.int  Current as of: April 1, 2020               Content Version: 12.4  © 2006-2020 [employer]. Care instructions adapted under license by your healthcare professional. If you have questions about a medical condition or this instruction, always ask your healthcare professional. [address] any warranty or liability for your use of this information.           Patient Education on Sedation / Analgesia Administered for Procedure      For 24 hours after general anesthesia or intravenous analgesia / sedation:  Have someone responsible help you with your care  Limit your activities  Do not drive and operate hazardous machinery  Do not make important personal, legal or business decisions  Do not drink alcoholic beverages  If you have not urinated within 8 hours after discharge, please contact your physician  Resume your medications unless otherwise instructed    For 24 hours after general anesthesia or intravenous analgesia / sedation  you may experience:  Drowsiness, dizziness, sleepiness, or confusion  Difficulty remembering or delayed reaction times  Difficulty with your balance, especially while walking, move slowly and carefully, do not make sudden position changes  Difficulty focusing or blurred vision    You may not be aware of slight changes in your behavior and/or your reaction time because of the medication used during and after your procedure.     Report the following to your physician:  Excessive pain, swelling, redness or odor of or around the surgical area  Temperature over 100.5  Nausea and vomiting lasting longer than 4 hours or if unable to take medications  Any signs of decreased circulation or nerve impairment to extremity: change in color, persistent numbness, tingling, coldness or increase pain  Any questions or concerns    IF YOU REPORT TO AN EMERGENCY ROOM, DOCTOR'S OFFICE OR HOSPITAL WITHIN 24 HOURS AFTER YOUR PROCEDURE, BRING THIS SHEET AND YOUR AFTER VISIT SUMMARY WITH YOU AND GIVE IT TO THE PHYSICIAN OR NURSE ATTENDING YOU.

## 2022-09-30 NOTE — ROUTINE PROCESS
TRANSFER - IN REPORT:    Verbal report received from Al(name) on Sarah Haines  being received from Endo(unit) for routine progression of care      Report consisted of patients Situation, Background, Assessment and   Recommendations(SBAR). Information from the following report(s) SBAR, Procedure Summary, and MAR was reviewed with the receiving nurse. Opportunity for questions and clarification was provided. Assessment completed upon patients arrival to unit and care assumed.

## 2022-10-17 ENCOUNTER — APPOINTMENT (OUTPATIENT)
Dept: CT IMAGING | Age: 77
DRG: 871 | End: 2022-10-17
Attending: EMERGENCY MEDICINE
Payer: MEDICARE

## 2022-10-17 ENCOUNTER — HOSPITAL ENCOUNTER (INPATIENT)
Age: 77
LOS: 7 days | Discharge: HOME HEALTH | DRG: 871 | End: 2022-10-24
Attending: EMERGENCY MEDICINE | Admitting: STUDENT IN AN ORGANIZED HEALTH CARE EDUCATION/TRAINING PROGRAM
Payer: MEDICARE

## 2022-10-17 ENCOUNTER — APPOINTMENT (OUTPATIENT)
Dept: GENERAL RADIOLOGY | Age: 77
DRG: 871 | End: 2022-10-17
Attending: EMERGENCY MEDICINE
Payer: MEDICARE

## 2022-10-17 DIAGNOSIS — D72.825 BANDEMIA: ICD-10-CM

## 2022-10-17 DIAGNOSIS — E11.8 CONTROLLED DIABETES MELLITUS TYPE 2 WITH COMPLICATIONS, UNSPECIFIED WHETHER LONG TERM INSULIN USE (HCC): ICD-10-CM

## 2022-10-17 DIAGNOSIS — R06.02 SHORTNESS OF BREATH: ICD-10-CM

## 2022-10-17 DIAGNOSIS — I21.4 NSTEMI (NON-ST ELEVATED MYOCARDIAL INFARCTION) (HCC): Primary | ICD-10-CM

## 2022-10-17 DIAGNOSIS — A31.9 ATYPICAL MYCOBACTERIAL INFECTION: ICD-10-CM

## 2022-10-17 DIAGNOSIS — J15.9 PNEUMONIA, BACTERIAL: ICD-10-CM

## 2022-10-17 DIAGNOSIS — E66.01 MORBID OBESITY (HCC): ICD-10-CM

## 2022-10-17 DIAGNOSIS — I21.4 NON-STEMI (NON-ST ELEVATED MYOCARDIAL INFARCTION) (HCC): ICD-10-CM

## 2022-10-17 DIAGNOSIS — J96.01 ACUTE RESPIRATORY FAILURE WITH HYPOXIA (HCC): ICD-10-CM

## 2022-10-17 LAB
ALBUMIN SERPL-MCNC: 4 G/DL (ref 3.5–5)
ALBUMIN/GLOB SERPL: 1.1 {RATIO} (ref 1.1–2.2)
ALP SERPL-CCNC: 76 U/L (ref 45–117)
ALT SERPL-CCNC: 50 U/L (ref 12–78)
ANION GAP SERPL CALC-SCNC: 10 MMOL/L (ref 5–15)
APTT PPP: 23.2 SEC (ref 22.1–31)
APTT PPP: 23.9 SEC (ref 22.1–31)
APTT PPP: 23.9 SEC (ref 22.1–31)
AST SERPL-CCNC: 40 U/L (ref 15–37)
BASOPHILS # BLD: 0.1 K/UL (ref 0–0.1)
BASOPHILS # BLD: 0.1 K/UL (ref 0–0.1)
BASOPHILS NFR BLD: 0 % (ref 0–1)
BASOPHILS NFR BLD: 0 % (ref 0–1)
BILIRUB SERPL-MCNC: 0.4 MG/DL (ref 0.2–1)
BNP SERPL-MCNC: 248 PG/ML
BUN SERPL-MCNC: 18 MG/DL (ref 6–20)
BUN/CREAT SERPL: 21 (ref 12–20)
CALCIUM SERPL-MCNC: 10.9 MG/DL (ref 8.5–10.1)
CHLORIDE SERPL-SCNC: 102 MMOL/L (ref 97–108)
CO2 SERPL-SCNC: 24 MMOL/L (ref 21–32)
COVID-19 RAPID TEST, COVR: NOT DETECTED
CREAT SERPL-MCNC: 0.87 MG/DL (ref 0.55–1.02)
DIFFERENTIAL METHOD BLD: ABNORMAL
DIFFERENTIAL METHOD BLD: ABNORMAL
EOSINOPHIL # BLD: 0.1 K/UL (ref 0–0.4)
EOSINOPHIL # BLD: 0.2 K/UL (ref 0–0.4)
EOSINOPHIL NFR BLD: 0 % (ref 0–7)
EOSINOPHIL NFR BLD: 1 % (ref 0–7)
ERYTHROCYTE [DISTWIDTH] IN BLOOD BY AUTOMATED COUNT: 14.6 % (ref 11.5–14.5)
ERYTHROCYTE [DISTWIDTH] IN BLOOD BY AUTOMATED COUNT: 15 % (ref 11.5–14.5)
GLOBULIN SER CALC-MCNC: 3.8 G/DL (ref 2–4)
GLUCOSE SERPL-MCNC: 161 MG/DL (ref 65–100)
HCT VFR BLD AUTO: 35.4 % (ref 35–47)
HCT VFR BLD AUTO: 37.1 % (ref 35–47)
HGB BLD-MCNC: 10.8 G/DL (ref 11.5–16)
HGB BLD-MCNC: 10.9 G/DL (ref 11.5–16)
IMM GRANULOCYTES # BLD AUTO: 0.1 K/UL (ref 0–0.04)
IMM GRANULOCYTES # BLD AUTO: 0.1 K/UL (ref 0–0.04)
IMM GRANULOCYTES NFR BLD AUTO: 0 % (ref 0–0.5)
IMM GRANULOCYTES NFR BLD AUTO: 1 % (ref 0–0.5)
INR PPP: 1 (ref 0.9–1.1)
LYMPHOCYTES # BLD: 2.4 K/UL (ref 0.8–3.5)
LYMPHOCYTES # BLD: 2.9 K/UL (ref 0.8–3.5)
LYMPHOCYTES NFR BLD: 15 % (ref 12–49)
LYMPHOCYTES NFR BLD: 16 % (ref 12–49)
MCH RBC QN AUTO: 24.6 PG (ref 26–34)
MCH RBC QN AUTO: 25.2 PG (ref 26–34)
MCHC RBC AUTO-ENTMCNC: 29.4 G/DL (ref 30–36.5)
MCHC RBC AUTO-ENTMCNC: 30.5 G/DL (ref 30–36.5)
MCV RBC AUTO: 82.5 FL (ref 80–99)
MCV RBC AUTO: 83.7 FL (ref 80–99)
MONOCYTES # BLD: 0.9 K/UL (ref 0–1)
MONOCYTES # BLD: 1.1 K/UL (ref 0–1)
MONOCYTES NFR BLD: 6 % (ref 5–13)
MONOCYTES NFR BLD: 6 % (ref 5–13)
NEUTS SEG # BLD: 12.4 K/UL (ref 1.8–8)
NEUTS SEG # BLD: 13.8 K/UL (ref 1.8–8)
NEUTS SEG NFR BLD: 77 % (ref 32–75)
NEUTS SEG NFR BLD: 78 % (ref 32–75)
NRBC # BLD: 0 K/UL (ref 0–0.01)
NRBC # BLD: 0 K/UL (ref 0–0.01)
NRBC BLD-RTO: 0 PER 100 WBC
NRBC BLD-RTO: 0 PER 100 WBC
PLATELET # BLD AUTO: 308 K/UL (ref 150–400)
PLATELET # BLD AUTO: 359 K/UL (ref 150–400)
PMV BLD AUTO: 10.5 FL (ref 8.9–12.9)
PMV BLD AUTO: 10.7 FL (ref 8.9–12.9)
POTASSIUM SERPL-SCNC: 4.1 MMOL/L (ref 3.5–5.1)
PROT SERPL-MCNC: 7.8 G/DL (ref 6.4–8.2)
PROTHROMBIN TIME: 10.3 SEC (ref 9–11.1)
RBC # BLD AUTO: 4.29 M/UL (ref 3.8–5.2)
RBC # BLD AUTO: 4.43 M/UL (ref 3.8–5.2)
SODIUM SERPL-SCNC: 136 MMOL/L (ref 136–145)
SOURCE, COVRS: NORMAL
THERAPEUTIC RANGE,PTTT: NORMAL SECS (ref 58–77)
TROPONIN-HIGH SENSITIVITY: 1746 NG/L (ref 0–51)
TROPONIN-HIGH SENSITIVITY: 1978 NG/L (ref 0–51)
WBC # BLD AUTO: 15.9 K/UL (ref 3.6–11)
WBC # BLD AUTO: 18.1 K/UL (ref 3.6–11)

## 2022-10-17 PROCEDURE — 80053 COMPREHEN METABOLIC PANEL: CPT

## 2022-10-17 PROCEDURE — 85730 THROMBOPLASTIN TIME PARTIAL: CPT

## 2022-10-17 PROCEDURE — 71275 CT ANGIOGRAPHY CHEST: CPT

## 2022-10-17 PROCEDURE — 80061 LIPID PANEL: CPT

## 2022-10-17 PROCEDURE — 84439 ASSAY OF FREE THYROXINE: CPT

## 2022-10-17 PROCEDURE — 93005 ELECTROCARDIOGRAM TRACING: CPT

## 2022-10-17 PROCEDURE — 36415 COLL VENOUS BLD VENIPUNCTURE: CPT

## 2022-10-17 PROCEDURE — 96376 TX/PRO/DX INJ SAME DRUG ADON: CPT

## 2022-10-17 PROCEDURE — 83036 HEMOGLOBIN GLYCOSYLATED A1C: CPT

## 2022-10-17 PROCEDURE — 99285 EMERGENCY DEPT VISIT HI MDM: CPT

## 2022-10-17 PROCEDURE — 87635 SARS-COV-2 COVID-19 AMP PRB: CPT

## 2022-10-17 PROCEDURE — 85025 COMPLETE CBC W/AUTO DIFF WBC: CPT

## 2022-10-17 PROCEDURE — 74011250637 HC RX REV CODE- 250/637: Performed by: STUDENT IN AN ORGANIZED HEALTH CARE EDUCATION/TRAINING PROGRAM

## 2022-10-17 PROCEDURE — 71046 X-RAY EXAM CHEST 2 VIEWS: CPT

## 2022-10-17 PROCEDURE — 83880 ASSAY OF NATRIURETIC PEPTIDE: CPT

## 2022-10-17 PROCEDURE — 84484 ASSAY OF TROPONIN QUANT: CPT

## 2022-10-17 PROCEDURE — 74011000636 HC RX REV CODE- 636: Performed by: EMERGENCY MEDICINE

## 2022-10-17 PROCEDURE — 74011000250 HC RX REV CODE- 250: Performed by: STUDENT IN AN ORGANIZED HEALTH CARE EDUCATION/TRAINING PROGRAM

## 2022-10-17 PROCEDURE — 96365 THER/PROPH/DIAG IV INF INIT: CPT

## 2022-10-17 PROCEDURE — 84100 ASSAY OF PHOSPHORUS: CPT

## 2022-10-17 PROCEDURE — 65270000046 HC RM TELEMETRY

## 2022-10-17 PROCEDURE — 85610 PROTHROMBIN TIME: CPT

## 2022-10-17 PROCEDURE — 74011250636 HC RX REV CODE- 250/636: Performed by: EMERGENCY MEDICINE

## 2022-10-17 PROCEDURE — 84443 ASSAY THYROID STIM HORMONE: CPT

## 2022-10-17 PROCEDURE — 83735 ASSAY OF MAGNESIUM: CPT

## 2022-10-17 PROCEDURE — 74011250636 HC RX REV CODE- 250/636: Performed by: STUDENT IN AN ORGANIZED HEALTH CARE EDUCATION/TRAINING PROGRAM

## 2022-10-17 RX ORDER — ASPIRIN 325 MG
325 TABLET ORAL ONCE
Status: COMPLETED | OUTPATIENT
Start: 2022-10-17 | End: 2022-10-17

## 2022-10-17 RX ORDER — SODIUM CHLORIDE 9 MG/ML
75 INJECTION, SOLUTION INTRAVENOUS CONTINUOUS
Status: DISPENSED | OUTPATIENT
Start: 2022-10-17 | End: 2022-10-18

## 2022-10-17 RX ORDER — HEPARIN SODIUM 1000 [USP'U]/ML
2000 INJECTION, SOLUTION INTRAVENOUS; SUBCUTANEOUS AS NEEDED
Status: DISCONTINUED | OUTPATIENT
Start: 2022-10-17 | End: 2022-10-24

## 2022-10-17 RX ORDER — HEPARIN SODIUM 1000 [USP'U]/ML
4000 INJECTION, SOLUTION INTRAVENOUS; SUBCUTANEOUS AS NEEDED
Status: DISCONTINUED | OUTPATIENT
Start: 2022-10-17 | End: 2022-10-24

## 2022-10-17 RX ORDER — HEPARIN SODIUM 10000 [USP'U]/100ML
9.5-25 INJECTION, SOLUTION INTRAVENOUS
Status: DISCONTINUED | OUTPATIENT
Start: 2022-10-17 | End: 2022-10-22

## 2022-10-17 RX ORDER — NITROGLYCERIN 0.4 MG/1
0.4 TABLET SUBLINGUAL AS NEEDED
Status: DISCONTINUED | OUTPATIENT
Start: 2022-10-17 | End: 2022-10-24 | Stop reason: HOSPADM

## 2022-10-17 RX ORDER — CARVEDILOL 3.12 MG/1
3.12 TABLET ORAL 2 TIMES DAILY WITH MEALS
Status: DISCONTINUED | OUTPATIENT
Start: 2022-10-18 | End: 2022-10-24 | Stop reason: HOSPADM

## 2022-10-17 RX ORDER — GUAIFENESIN 100 MG/5ML
81 LIQUID (ML) ORAL DAILY
Status: DISCONTINUED | OUTPATIENT
Start: 2022-10-18 | End: 2022-10-24 | Stop reason: HOSPADM

## 2022-10-17 RX ORDER — MORPHINE SULFATE 2 MG/ML
1 INJECTION, SOLUTION INTRAMUSCULAR; INTRAVENOUS
Status: DISCONTINUED | OUTPATIENT
Start: 2022-10-17 | End: 2022-10-24 | Stop reason: HOSPADM

## 2022-10-17 RX ORDER — HEPARIN SODIUM 1000 [USP'U]/ML
4000 INJECTION, SOLUTION INTRAVENOUS; SUBCUTANEOUS ONCE
Status: COMPLETED | OUTPATIENT
Start: 2022-10-17 | End: 2022-10-17

## 2022-10-17 RX ADMIN — IOPAMIDOL 70 ML: 755 INJECTION, SOLUTION INTRAVENOUS at 17:28

## 2022-10-17 RX ADMIN — HEPARIN SODIUM 4000 UNITS: 1000 INJECTION INTRAVENOUS; SUBCUTANEOUS at 17:35

## 2022-10-17 RX ADMIN — HEPARIN SODIUM 9.5 UNITS/KG/HR: 10000 INJECTION, SOLUTION INTRAVENOUS at 17:49

## 2022-10-17 RX ADMIN — FAMOTIDINE 20 MG: 10 INJECTION, SOLUTION INTRAVENOUS at 21:22

## 2022-10-17 RX ADMIN — ASPIRIN 325 MG ORAL TABLET 325 MG: 325 PILL ORAL at 21:22

## 2022-10-17 RX ADMIN — SODIUM CHLORIDE 75 ML/HR: 9 INJECTION, SOLUTION INTRAVENOUS at 21:22

## 2022-10-17 NOTE — ED PROVIDER NOTES
EMERGENCY DEPARTMENT HISTORY AND PHYSICAL EXAM      Date: 10/17/2022  Patient Name: Jennifer Hunter    History of Presenting Illness     Chief Complaint   Patient presents with    Shortness of Breath     Pt ambulatory into triage with a cc of shortness of breath and cough that started this am        History Provided By: Patient    HPI: Jennifer Hunter, 69 y.o. female with PMHx significant for diabetes, hypertension, who presents with a chief complaint of cough and shortness of breath. Patient reports she has had a cough and URI symptoms for the last week which she attributed to allergies. She was at her primary care doctor on Thursday and was told to continue using Flonase and Zyrtec. She tells me she became acutely more short of breath today prompting her trip to the emergency department. She denies any associated shortness of breath or fever. Denies any known sick contacts. No abdominal pain, nausea, vomiting. Denies any cardiac history. PCP: Gayle Cade MD    There are no other associated signs or symptoms. No other exacerbating or alleviating factors. There are no other complaints, changes, or physical findings at this time.     Current Facility-Administered Medications   Medication Dose Route Frequency Provider Last Rate Last Admin    heparin 25,000 units in D5W 250 ml infusion  9.5-25 Units/kg/hr IntraVENous TITRATE Carmen Mendoza MD 9.9 mL/hr at 10/17/22 1749 9.5 Units/kg/hr at 10/17/22 1749    heparin (porcine) 1,000 unit/mL injection 2,000 Units  2,000 Units IntraVENous PRN Carmen Mendoza MD        Or    heparin (porcine) 1,000 unit/mL injection 4,000 Units  4,000 Units IntraVENous PRN Carmen Mendoza MD        morphine injection 1 mg  1 mg IntraVENous Q4H PRN Demar Gonzalez DO        [START ON 10/18/2022] aspirin chewable tablet 81 mg  81 mg Oral DAILY Demar Gonzalez DO        nitroglycerin (NITROSTAT) tablet 0.4 mg  0.4 mg SubLINGual PRN Brooks Zheng Isidro DO        [START ON 10/18/2022] carvediloL (COREG) tablet 3.125 mg  3.125 mg Oral BID WITH MEALS Isidro Vang DO        famotidine (PF) (PEPCID) 20 mg in 0.9% sodium chloride 10 mL injection  20 mg IntraVENous Q12H JesusIsidro DO   20 mg at 10/17/22 2122    0.9% sodium chloride infusion  75 mL/hr IntraVENous CONTINUOUS Earney Gemma, DO 75 mL/hr at 10/17/22 2122 75 mL/hr at 10/17/22 2122     Past History     Past Medical History:  Past Medical History:   Diagnosis Date    Diabetes ([facility] [address]     Hypertension      Past Surgical History:  Past Surgical History:   Procedure Laterality Date    COLONOSCOPY N/A 2020    COL[facility] Patrick [facility]    COLONOSCOPY N/A 2022    COLONOSCOPY performed [facility] Paulo Boss [facility] [address]  2020         HX BREAST BIOPSY Left     36 yrs ago--neg    HX CATARACT REMOVAL Bilateral     HX HEENT      upper lid lift    HX ORTHOPAEDIC Bilateral     carpal tunnel release    HX OTHER SURGICAL      colonoscopy     Family History:  Family History   Problem Relation Age of Onset    Diabetes Mother     Hypertension Mother     Hypertension Father     Other Father         cerebral aneurysm    Diabetes Brother     Hypertension Brother     Lung Disease Brother     Diabetes Sister      Social History:  Social History     Tobacco Use    Smoking status: Former     Packs/day: 1.00     Years: 25.00     Pack years: 25.00     Types: Cigarettes     Quit date: 1999     Years since quittin.0    Smokeless tobacco: Never   Substance Use Topics    Alcohol use: Never     Comment: none in 30 years    Drug use: Never     Allergies: Allergies   Allergen Reactions    Bactrim [Sulfamethoprim] Hives and Itching     Review of Systems   Review of Systems   Constitutional:  Negative for chills and fever. HENT:  Negative for congestion, rhinorrhea and sore throat.     Respiratory: Positive for shortness of breath. Negative for cough. Cardiovascular:  Negative for chest pain. Gastrointestinal:  Negative for abdominal pain, nausea and vomiting. Genitourinary:  Negative for dysuria and urgency. Skin:  Negative for rash. Neurological:  Negative for dizziness, light-headedness and headaches. All other systems reviewed and are negative. Physical Exam   Physical Exam  Vitals and nursing note reviewed. Constitutional:       General: She is not in acute distress. Appearance: She is well-developed. HENT:      Head: Normocephalic and atraumatic. Eyes:      Conjunctiva/sclera: Conjunctivae normal.      Pupils: Pupils are equal, round, and reactive to light. Cardiovascular:      Rate and Rhythm: Regular rhythm. Tachycardia present. Pulmonary:      Effort: Pulmonary effort is normal. No respiratory distress. Breath sounds: Normal breath sounds. No stridor. Abdominal:      General: There is no distension. Palpations: Abdomen is soft. Tenderness: There is no abdominal tenderness. Musculoskeletal:         General: Normal range of motion. Cervical back: Normal range of motion. Skin:     General: Skin is warm and dry. Neurological:      Mental Status: She is alert and oriented to person, place, and time. Psychiatric:         Mood and Affect: Mood normal.         Thought Content:  Thought content normal.     Diagnostic Study Results   Labs -     Recent Results (from the past 12 hour(s))   CBC WITH AUTOMATED DIFF    Collection Time: 10/17/22  2:27 PM   Result Value Ref Range    WBC 18.1 (H) 3.6 - 11.0 K/uL    RBC 4.43 3.80 - 5.20 M/uL    HGB 10.9 (L) 11.5 - 16.0 g/dL    HCT 37.1 35.0 - 47.0 %    MCV 83.7 80.0 - 99.0 FL    MCH 24.6 (L) 26.0 - 34.0 PG    MCHC 29.4 (L) 30.0 - 36.5 g/dL    RDW 15.0 (H) 11.5 - 14.5 %    PLATELET 148 624 - 001 K/uL    MPV 10.7 8.9 - 12.9 FL    NRBC 0.0 0  WBC    ABSOLUTE NRBC 0.00 0.00 - 0.01 K/uL    NEUTROPHILS 77 (H) 32 - 75 %    LYMPHOCYTES 16 12 - 49 %    MONOCYTES 6 5 - 13 %    EOSINOPHILS 1 0 - 7 %    BASOPHILS 0 0 - 1 %    IMMATURE GRANULOCYTES 0 0.0 - 0.5 %    ABS. NEUTROPHILS 13.8 (H) 1.8 - 8.0 K/UL    ABS. LYMPHOCYTES 2.9 0.8 - 3.5 K/UL    ABS. MONOCYTES 1.1 (H) 0.0 - 1.0 K/UL    ABS. EOSINOPHILS 0.2 0.0 - 0.4 K/UL    ABS. BASOPHILS 0.1 0.0 - 0.1 K/UL    ABS. IMM. GRANS. 0.1 (H) 0.00 - 0.04 K/UL    DF AUTOMATED     METABOLIC PANEL, COMPREHENSIVE    Collection Time: 10/17/22  2:27 PM   Result Value Ref Range    Sodium 136 136 - 145 mmol/L    Potassium 4.1 3.5 - 5.1 mmol/L    Chloride 102 97 - 108 mmol/L    CO2 24 21 - 32 mmol/L    Anion gap 10 5 - 15 mmol/L    Glucose 161 (H) 65 - 100 mg/dL    BUN 18 6 - 20 MG/DL    Creatinine 0.87 0.55 - 1.02 MG/DL    BUN/Creatinine ratio 21 (H) 12 - 20      eGFR >60 >60 ml/min/1.73m2    Calcium 10.9 (H) 8.5 - 10.1 MG/DL    Bilirubin, total 0.4 0.2 - 1.0 MG/DL    ALT (SGPT) 50 12 - 78 U/L    AST (SGOT) 40 (H) 15 - 37 U/L    Alk.  phosphatase 76 45 - 117 U/L    Protein, total 7.8 6.4 - 8.2 g/dL    Albumin 4.0 3.5 - 5.0 g/dL    Globulin 3.8 2.0 - 4.0 g/dL    A-G Ratio 1.1 1.1 - 2.2     TROPONIN-HIGH SENSITIVITY    Collection Time: 10/17/22  2:27 PM   Result Value Ref Range    Troponin-High Sensitivity 1,746 (HH) 0 - 51 ng/L   NT-PRO BNP    Collection Time: 10/17/22  2:27 PM   Result Value Ref Range    NT pro- <450 PG/ML   PROTHROMBIN TIME + INR    Collection Time: 10/17/22  4:01 PM   Result Value Ref Range    INR 1.0 0.9 - 1.1      Prothrombin time 10.3 9.0 - 11.1 sec   PTT    Collection Time: 10/17/22  4:01 PM   Result Value Ref Range    aPTT 23.9 22.1 - 31.0 sec    aPTT, therapeutic range     58.0 - 77.0 SECS   TROPONIN-HIGH SENSITIVITY    Collection Time: 10/17/22  5:04 PM   Result Value Ref Range    Troponin-High Sensitivity 1,978 (HH) 0 - 51 ng/L   PTT    Collection Time: 10/17/22  5:04 PM   Result Value Ref Range    aPTT 23.2 22.1 - 31.0 sec    aPTT, therapeutic range     58.0 - 77.0 SECS   PTT    Collection Time: 10/17/22  5:08 PM   Result Value Ref Range    aPTT 23.9 22.1 - 31.0 sec    aPTT, therapeutic range     58.0 - 77.0 SECS   CBC WITH AUTOMATED DIFF    Collection Time: 10/17/22  5:08 PM   Result Value Ref Range    WBC 15.9 (H) 3.6 - 11.0 K/uL    RBC 4.29 3.80 - 5.20 M/uL    HGB 10.8 (L) 11.5 - 16.0 g/dL    HCT 35.4 35.0 - 47.0 %    MCV 82.5 80.0 - 99.0 FL    MCH 25.2 (L) 26.0 - 34.0 PG    MCHC 30.5 30.0 - 36.5 g/dL    RDW 14.6 (H) 11.5 - 14.5 %    PLATELET 233 516 - 516 K/uL    MPV 10.5 8.9 - 12.9 FL    NRBC 0.0 0  WBC    ABSOLUTE NRBC 0.00 0.00 - 0.01 K/uL    NEUTROPHILS 78 (H) 32 - 75 %    LYMPHOCYTES 15 12 - 49 %    MONOCYTES 6 5 - 13 %    EOSINOPHILS 0 0 - 7 %    BASOPHILS 0 0 - 1 %    IMMATURE GRANULOCYTES 1 (H) 0.0 - 0.5 %    ABS. NEUTROPHILS 12.4 (H) 1.8 - 8.0 K/UL    ABS. LYMPHOCYTES 2.4 0.8 - 3.5 K/UL    ABS. MONOCYTES 0.9 0.0 - 1.0 K/UL    ABS. EOSINOPHILS 0.1 0.0 - 0.4 K/UL    ABS. BASOPHILS 0.1 0.0 - 0.1 K/UL    ABS. IMM. GRANS. 0.1 (H) 0.00 - 0.04 K/UL    DF AUTOMATED     COVID-19 RAPID TEST    Collection Time: 10/17/22  7:18 PM   Result Value Ref Range    Specimen source Nasopharyngeal      COVID-19 rapid test Not detected NOTD         Radiologic Studies -   CTA CHEST W OR W WO CONT   Final Result   Limited exam   1. No visualized pulmonary embolus. 2. Centrilobular emphysema. 3. Possible interstitial prominence which could represent vascular congestion. 4. Query micronodularity in the lingula which may represent an atypical   infectious process such as mycobacterium. 5. Left thyroid nodule measuring up to 4.5 cm. Recommend nonemergent ultrasound   for further evaluation if not already performed at an outside facility. 6. Incidental findings as above. XR CHEST PA LAT   Final Result   Diffuse bilateral increased interstitial markings, likely   representing interstitial edema.             XR CHEST PA LAT    Result Date: 10/17/2022  Diffuse bilateral increased interstitial markings, likely representing interstitial edema. CTA CHEST W OR W WO CONT    Result Date: 10/17/2022  Limited exam 1. No visualized pulmonary embolus. 2. Centrilobular emphysema. 3. Possible interstitial prominence which could represent vascular congestion. 4. Query micronodularity in the lingula which may represent an atypical infectious process such as mycobacterium. 5. Left thyroid nodule measuring up to 4.5 cm. Recommend nonemergent ultrasound for further evaluation if not already performed at an outside facility. 6. Incidental findings as above. Medical Decision Making   I am the first provider for this patient. I reviewed the vital signs, available nursing notes, past medical history, past surgical history, family history and social history. Vital Signs-Reviewed the patient's vital signs. Patient Vitals for the past 12 hrs:   Temp Pulse Resp BP SpO2   10/17/22 2030 98.7 °F (37.1 °C) 98 24 129/77 --   10/17/22 1930 98.4 °F (36.9 °C) (!) 102 20 124/81 92 %   10/17/22 1917 -- 99 15 124/81 92 %   10/17/22 1817 -- 99 23 127/69 94 %   10/17/22 1745 -- (!) 101 16 107/71 92 %   10/17/22 1647 -- (!) 103 23 (!) 130/92 94 %   10/17/22 1617 -- (!) 112 21 127/89 91 %   10/17/22 1547 -- (!) 107 19 108/82 94 %   10/17/22 1545 -- -- -- -- 93 %   10/17/22 1417 98.9 °F (37.2 °C) (!) 130 18 (!) 148/83 92 %       Pulse Oximetry Analysis - 92% on 2L    Cardiac Monitor:   Rate: 115 bpm  Rhythm: Sinus Tachycardia        Records Reviewed: Nursing Notes and Old Medical Records    Provider Notes (Medical Decision Making):   Patient presents with a chief complaint of shortness of breath. On presentation she was tachycardic to the 120s to 130s. Satting in the high 80s to low 90s on room air. Placed on 2 L nasal cannula. On my evaluation she is able to speak in complete sentences and appears comfortable. Differential includes pneumonia, PE, CHF, COVID-19.   Plan for basic lab work, CTA of the chest, troponin, BNP, COVID swab. ED Course:   Initial assessment performed. The patients presenting problems have been discussed, and they are in agreement with the care plan formulated and outlined with them. I have encouraged them to ask questions as they arise throughout their visit. CTA does not show any evidence of pulmonary embolism. Troponin returned elevated at 1700. Patient placed on heparin drip. Discussed with hospitalist for admission       Procedures:  Critical Care  Performed by: Carli Corbett MD  Authorized by: Carli Corbett MD     Critical care provider statement:     Critical care time (minutes):  50    Critical care time was exclusive of:  Separately billable procedures and treating other patients    Critical care was necessary to treat or prevent imminent or life-threatening deterioration of the following conditions:  Cardiac failure, circulatory failure and respiratory failure    Critical care was time spent personally by me on the following activities:  Ordering and performing treatments and interventions, ordering and review of laboratory studies, ordering and review of radiographic studies, pulse oximetry, re-evaluation of patient's condition, review of old charts, examination of patient and evaluation of patient's response to treatment    Care discussed with: admitting provider      Critical Care:      Disposition:    Admission Note:  Patient is being admitted to the hospital by Dr. Miya Manuel, Service: Hospitalist.  The results of their tests and reasons for their admission have been discussed with them and available family. They convey agreement and understanding for the need to be admitted and for their admission diagnosis. Diagnosis     Clinical Impression:   1. NSTEMI (non-ST elevated myocardial infarction) [facility])            Please note that this dictation was completed with [employer], the computer voice recognition software.   Quite often unanticipated grammatical, syntax, homophones, and other interpretive errors are inadvertently transcribed by the computer software. Please disregard these errors.   Please excuse any errors that have escaped final proofreading

## 2022-10-17 NOTE — Clinical Note
Aspirin Registry Question:   Aspirin was given prior to the procedure.
Brought patient from room 61 51 81 in wheelchair to the cath lab
Closed using R-Band. Radial band pressure set at: 10.
Contrast: Isovue. Contrast concentration: 370. Amount: 70 mL.
Diagnostic catheter inserted over exchange length wire.
Diagnostic catheter inserted over the wire.
Diagnostic catheter inserted over the wire. Catheter used: Pigtail (straight).
Diagnostic catheter removed over exchange length wire. Catheter used: Pigtail (straight).
Diagnostic catheter removed over the wire.
Diagnostic catheter removed over the wire.
Guide catheter inserted over the wire.
Guide catheter removed over the wire.
History and physical documented and up to date, allergies reviewed, lab results reviewed, pre-procedure education provided, patient verbalized understanding of procedure, procedural consent signed and patient is NPO.
ID band present and verified. Family is in patient room.
J-wire advanced
Mallampati: Class III - soft palate, base of uvula visible. ASA: Class 3 - patient with severe systemic disease.
Meeting resistance with advancing the pigtail catheter, so stiff angled glide wire advanced for assistance.
Multiple views of the left main, left coronary artery, LAD and circumflex artery obtained using hand injection.
Multiple views of the left main, left coronary artery, LAD and circumflex artery obtained using hand injection.
Multiple views of the right coronary artery obtained using hand injection.
No specimen collected. Estimated Blood Loss: <30 mL.
Physician has arrived.
Report received from DAVID Hicks RN with 50mcg of fentanyl and 2mg of versed passed on
Right radial artery. Accessed successfully. Radial access needle used. Using ultrasound guidance. Number of attempts =  2.
Sheath #1: Presents as clean, dry, & intact, no bleeding and no hematoma.
Sheath #1: Sheath: inserted. Sheath inserted/placed in the right radial  artery.
Sheath #1: Sheath: removed. Hemostasis achieved.
Stiff angled glide wire removed.
TRANSFER - OUT REPORT:     Verbal report given to: [facility]. Report consisted of patient's Situation, Background, Assessment and   Recommendations(SBAR). Opportunity for questions and clarification was provided. Patient transported with a Registered Nurse. [facility]
The physician has been notified.
j-wire removed and unable to advance TIG catheter
no

## 2022-10-17 NOTE — ED NOTES
Pt ambulatory into triage with a cc of shortness of breath and cough that started this am.  Pt went to the [place] with  and began to feel progressively SOB with intermittent chest pain.

## 2022-10-17 NOTE — ED NOTES
17:00 Verified with pharmacy rate of 9.5 units/kg  with pharmacy.     Per Dr. Maryjane Osler - increased o2 the 4 L as sat on 2L was 89% - 90%

## 2022-10-17 NOTE — ED NOTES
TRANSFER - OUT REPORT:    Verbal report given to Jose Barnes RN(name) on Jose Cheek  being transferred to [facility](unit) for routine progression of care       Report consisted of patients Situation, Background, Assessment and   Recommendations(SBAR). Information from the following report(s) SBAR, Kardex, ED Summary, and MAR was reviewed with the receiving nurse. Lines:   Peripheral IV 10/17/22 Anterior;Left;Proximal Forearm (Active)        Opportunity for questions and clarification was provided.       Patient transported with:   Monitor  O2 @ 4 liters  Registered Nurse

## 2022-10-18 LAB
ALBUMIN SERPL-MCNC: 3.7 G/DL (ref 3.5–5)
ALBUMIN/GLOB SERPL: 1.2 {RATIO} (ref 1.1–2.2)
ALP SERPL-CCNC: 62 U/L (ref 45–117)
ALT SERPL-CCNC: 40 U/L (ref 12–78)
ANION GAP SERPL CALC-SCNC: 11 MMOL/L (ref 5–15)
APTT PPP: 29.4 SEC (ref 22.1–31)
APTT PPP: 39.1 SEC (ref 22.1–31)
APTT PPP: 41.4 SEC (ref 22.1–31)
AST SERPL-CCNC: 32 U/L (ref 15–37)
ATRIAL RATE: 119 BPM
BILIRUB SERPL-MCNC: 0.7 MG/DL (ref 0.2–1)
BUN SERPL-MCNC: 19 MG/DL (ref 6–20)
BUN/CREAT SERPL: 23 (ref 12–20)
CALCIUM SERPL-MCNC: 10.5 MG/DL (ref 8.5–10.1)
CALCULATED R AXIS, ECG10: 63 DEGREES
CALCULATED T AXIS, ECG11: 39 DEGREES
CHLORIDE SERPL-SCNC: 102 MMOL/L (ref 97–108)
CHOLEST SERPL-MCNC: 153 MG/DL
CO2 SERPL-SCNC: 24 MMOL/L (ref 21–32)
CREAT SERPL-MCNC: 0.81 MG/DL (ref 0.55–1.02)
DIAGNOSIS, 93000: NORMAL
ERYTHROCYTE [DISTWIDTH] IN BLOOD BY AUTOMATED COUNT: 15.1 % (ref 11.5–14.5)
GLOBULIN SER CALC-MCNC: 3.1 G/DL (ref 2–4)
GLUCOSE BLD STRIP.AUTO-MCNC: 182 MG/DL (ref 65–117)
GLUCOSE BLD STRIP.AUTO-MCNC: 210 MG/DL (ref 65–117)
GLUCOSE BLD STRIP.AUTO-MCNC: 218 MG/DL (ref 65–117)
GLUCOSE BLD STRIP.AUTO-MCNC: 222 MG/DL (ref 65–117)
GLUCOSE SERPL-MCNC: 216 MG/DL (ref 65–100)
HCT VFR BLD AUTO: 34.8 % (ref 35–47)
HDLC SERPL-MCNC: 44 MG/DL
HDLC SERPL: 3.5 {RATIO} (ref 0–5)
HGB BLD-MCNC: 10.5 G/DL (ref 11.5–16)
LDLC SERPL CALC-MCNC: 71.8 MG/DL (ref 0–100)
MAGNESIUM SERPL-MCNC: 1.7 MG/DL (ref 1.6–2.4)
MAGNESIUM SERPL-MCNC: 2 MG/DL (ref 1.6–2.4)
MCH RBC QN AUTO: 24.9 PG (ref 26–34)
MCHC RBC AUTO-ENTMCNC: 30.2 G/DL (ref 30–36.5)
MCV RBC AUTO: 82.7 FL (ref 80–99)
NRBC # BLD: 0 K/UL (ref 0–0.01)
NRBC BLD-RTO: 0 PER 100 WBC
PHOSPHATE SERPL-MCNC: 3.1 MG/DL (ref 2.6–4.7)
PHOSPHATE SERPL-MCNC: 3.7 MG/DL (ref 2.6–4.7)
PLATELET # BLD AUTO: 277 K/UL (ref 150–400)
PMV BLD AUTO: 10.8 FL (ref 8.9–12.9)
POTASSIUM SERPL-SCNC: 4.4 MMOL/L (ref 3.5–5.1)
PROT SERPL-MCNC: 6.8 G/DL (ref 6.4–8.2)
Q-T INTERVAL, ECG07: 300 MS
QRS DURATION, ECG06: 72 MS
QTC CALCULATION (BEZET), ECG08: 422 MS
RBC # BLD AUTO: 4.21 M/UL (ref 3.8–5.2)
SERVICE CMNT-IMP: ABNORMAL
SODIUM SERPL-SCNC: 137 MMOL/L (ref 136–145)
T4 FREE SERPL-MCNC: 1.3 NG/DL (ref 0.8–1.5)
THERAPEUTIC RANGE,PTTT: ABNORMAL SECS (ref 58–77)
THERAPEUTIC RANGE,PTTT: ABNORMAL SECS (ref 58–77)
THERAPEUTIC RANGE,PTTT: NORMAL SECS (ref 58–77)
TRIGL SERPL-MCNC: 186 MG/DL (ref ?–150)
TROPONIN-HIGH SENSITIVITY: 1847 NG/L (ref 0–51)
TROPONIN-HIGH SENSITIVITY: 2018 NG/L (ref 0–51)
TROPONIN-HIGH SENSITIVITY: 2018 NG/L (ref 0–51)
TSH SERPL DL<=0.05 MIU/L-ACNC: 1.12 UIU/ML (ref 0.36–3.74)
VENTRICULAR RATE, ECG03: 119 BPM
VLDLC SERPL CALC-MCNC: 37.2 MG/DL
WBC # BLD AUTO: 13.1 K/UL (ref 3.6–11)

## 2022-10-18 PROCEDURE — 83735 ASSAY OF MAGNESIUM: CPT

## 2022-10-18 PROCEDURE — 74011250636 HC RX REV CODE- 250/636: Performed by: INTERNAL MEDICINE

## 2022-10-18 PROCEDURE — 76937 US GUIDE VASCULAR ACCESS: CPT

## 2022-10-18 PROCEDURE — 87305 ASPERGILLUS AG IA: CPT

## 2022-10-18 PROCEDURE — 85730 THROMBOPLASTIN TIME PARTIAL: CPT

## 2022-10-18 PROCEDURE — 82962 GLUCOSE BLOOD TEST: CPT

## 2022-10-18 PROCEDURE — 74011000258 HC RX REV CODE- 258: Performed by: INTERNAL MEDICINE

## 2022-10-18 PROCEDURE — 94640 AIRWAY INHALATION TREATMENT: CPT

## 2022-10-18 PROCEDURE — 86738 MYCOPLASMA ANTIBODY: CPT

## 2022-10-18 PROCEDURE — 94761 N-INVAS EAR/PLS OXIMETRY MLT: CPT

## 2022-10-18 PROCEDURE — 74011250637 HC RX REV CODE- 250/637: Performed by: STUDENT IN AN ORGANIZED HEALTH CARE EDUCATION/TRAINING PROGRAM

## 2022-10-18 PROCEDURE — 74011250636 HC RX REV CODE- 250/636: Performed by: EMERGENCY MEDICINE

## 2022-10-18 PROCEDURE — 84100 ASSAY OF PHOSPHORUS: CPT

## 2022-10-18 PROCEDURE — 97165 OT EVAL LOW COMPLEX 30 MIN: CPT

## 2022-10-18 PROCEDURE — 36415 COLL VENOUS BLD VENIPUNCTURE: CPT

## 2022-10-18 PROCEDURE — 85027 COMPLETE CBC AUTOMATED: CPT

## 2022-10-18 PROCEDURE — 87077 CULTURE AEROBIC IDENTIFY: CPT

## 2022-10-18 PROCEDURE — 97535 SELF CARE MNGMENT TRAINING: CPT

## 2022-10-18 PROCEDURE — 65270000046 HC RM TELEMETRY

## 2022-10-18 PROCEDURE — 87186 SC STD MICRODIL/AGAR DIL: CPT

## 2022-10-18 PROCEDURE — 74011636637 HC RX REV CODE- 636/637: Performed by: STUDENT IN AN ORGANIZED HEALTH CARE EDUCATION/TRAINING PROGRAM

## 2022-10-18 PROCEDURE — 97530 THERAPEUTIC ACTIVITIES: CPT

## 2022-10-18 PROCEDURE — 77030029684 HC NEB SM VOL KT MONA -A

## 2022-10-18 PROCEDURE — 87449 NOS EACH ORGANISM AG IA: CPT

## 2022-10-18 PROCEDURE — 87070 CULTURE OTHR SPECIMN AEROBIC: CPT

## 2022-10-18 PROCEDURE — 86612 BLASTOMYCES ANTIBODY: CPT

## 2022-10-18 PROCEDURE — 99223 1ST HOSP IP/OBS HIGH 75: CPT | Performed by: INTERNAL MEDICINE

## 2022-10-18 PROCEDURE — 86480 TB TEST CELL IMMUN MEASURE: CPT

## 2022-10-18 PROCEDURE — 77010033678 HC OXYGEN DAILY

## 2022-10-18 PROCEDURE — 74011000250 HC RX REV CODE- 250: Performed by: STUDENT IN AN ORGANIZED HEALTH CARE EDUCATION/TRAINING PROGRAM

## 2022-10-18 PROCEDURE — 74011250636 HC RX REV CODE- 250/636: Performed by: STUDENT IN AN ORGANIZED HEALTH CARE EDUCATION/TRAINING PROGRAM

## 2022-10-18 PROCEDURE — 87116 MYCOBACTERIA CULTURE: CPT

## 2022-10-18 PROCEDURE — 80053 COMPREHEN METABOLIC PANEL: CPT

## 2022-10-18 RX ORDER — IPRATROPIUM BROMIDE AND ALBUTEROL SULFATE 2.5; .5 MG/3ML; MG/3ML
3 SOLUTION RESPIRATORY (INHALATION)
Status: DISCONTINUED | OUTPATIENT
Start: 2022-10-18 | End: 2022-10-18

## 2022-10-18 RX ORDER — FUROSEMIDE 20 MG/1
20 TABLET ORAL AS NEEDED
Status: DISCONTINUED | OUTPATIENT
Start: 2022-10-18 | End: 2022-10-24 | Stop reason: HOSPADM

## 2022-10-18 RX ORDER — IPRATROPIUM BROMIDE AND ALBUTEROL SULFATE 2.5; .5 MG/3ML; MG/3ML
3 SOLUTION RESPIRATORY (INHALATION)
Status: DISCONTINUED | OUTPATIENT
Start: 2022-10-19 | End: 2022-10-20

## 2022-10-18 RX ORDER — LOSARTAN POTASSIUM 25 MG/1
50 TABLET ORAL DAILY
Status: DISCONTINUED | OUTPATIENT
Start: 2022-10-18 | End: 2022-10-20

## 2022-10-18 RX ORDER — ATORVASTATIN CALCIUM 10 MG/1
10 TABLET, FILM COATED ORAL
Status: DISCONTINUED | OUTPATIENT
Start: 2022-10-18 | End: 2022-10-24 | Stop reason: HOSPADM

## 2022-10-18 RX ORDER — MAGNESIUM SULFATE 100 %
16 CRYSTALS MISCELLANEOUS AS NEEDED
Status: DISCONTINUED | OUTPATIENT
Start: 2022-10-18 | End: 2022-10-24 | Stop reason: HOSPADM

## 2022-10-18 RX ORDER — INSULIN LISPRO 100 [IU]/ML
1-8 INJECTION, SOLUTION INTRAVENOUS; SUBCUTANEOUS
Status: DISCONTINUED | OUTPATIENT
Start: 2022-10-18 | End: 2022-10-24 | Stop reason: HOSPADM

## 2022-10-18 RX ORDER — MAGNESIUM SULFATE HEPTAHYDRATE 40 MG/ML
2 INJECTION, SOLUTION INTRAVENOUS ONCE
Status: COMPLETED | OUTPATIENT
Start: 2022-10-18 | End: 2022-10-18

## 2022-10-18 RX ADMIN — LOSARTAN POTASSIUM 50 MG: 25 TABLET, FILM COATED ORAL at 08:57

## 2022-10-18 RX ADMIN — CEFEPIME 2 G: 2 INJECTION, POWDER, FOR SOLUTION INTRAVENOUS at 21:51

## 2022-10-18 RX ADMIN — IPRATROPIUM BROMIDE AND ALBUTEROL SULFATE 3 ML: .5; 3 SOLUTION RESPIRATORY (INHALATION) at 09:17

## 2022-10-18 RX ADMIN — IPRATROPIUM BROMIDE AND ALBUTEROL SULFATE 3 ML: .5; 3 SOLUTION RESPIRATORY (INHALATION) at 06:13

## 2022-10-18 RX ADMIN — Medication 3 UNITS: at 16:57

## 2022-10-18 RX ADMIN — ASPIRIN 81 MG CHEWABLE TABLET 81 MG: 81 TABLET CHEWABLE at 08:57

## 2022-10-18 RX ADMIN — HEPARIN SODIUM 4000 UNITS: 1000 INJECTION INTRAVENOUS; SUBCUTANEOUS at 01:11

## 2022-10-18 RX ADMIN — IPRATROPIUM BROMIDE AND ALBUTEROL SULFATE 3 ML: .5; 3 SOLUTION RESPIRATORY (INHALATION) at 15:46

## 2022-10-18 RX ADMIN — MAGNESIUM SULFATE HEPTAHYDRATE 2 G: 40 INJECTION, SOLUTION INTRAVENOUS at 06:24

## 2022-10-18 RX ADMIN — ATORVASTATIN CALCIUM 10 MG: 10 TABLET, FILM COATED ORAL at 01:11

## 2022-10-18 RX ADMIN — CARVEDILOL 3.12 MG: 3.12 TABLET, FILM COATED ORAL at 08:57

## 2022-10-18 RX ADMIN — HEPARIN SODIUM 2000 UNITS: 1000 INJECTION INTRAVENOUS; SUBCUTANEOUS at 11:09

## 2022-10-18 RX ADMIN — ATORVASTATIN CALCIUM 10 MG: 10 TABLET, FILM COATED ORAL at 21:53

## 2022-10-18 RX ADMIN — IPRATROPIUM BROMIDE AND ALBUTEROL SULFATE 3 ML: .5; 3 SOLUTION RESPIRATORY (INHALATION) at 19:50

## 2022-10-18 RX ADMIN — HEPARIN SODIUM 4000 UNITS: 1000 INJECTION INTRAVENOUS; SUBCUTANEOUS at 21:55

## 2022-10-18 RX ADMIN — HEPARIN SODIUM 15.5 UNITS/KG/HR: 10000 INJECTION, SOLUTION INTRAVENOUS at 11:14

## 2022-10-18 RX ADMIN — FAMOTIDINE 20 MG: 10 INJECTION, SOLUTION INTRAVENOUS at 08:57

## 2022-10-18 RX ADMIN — IPRATROPIUM BROMIDE AND ALBUTEROL SULFATE 3 ML: .5; 3 SOLUTION RESPIRATORY (INHALATION) at 13:05

## 2022-10-18 RX ADMIN — Medication 2 UNITS: at 21:53

## 2022-10-18 RX ADMIN — AZITHROMYCIN MONOHYDRATE 500 MG: 500 INJECTION, POWDER, LYOPHILIZED, FOR SOLUTION INTRAVENOUS at 20:23

## 2022-10-18 RX ADMIN — Medication 3 UNITS: at 08:57

## 2022-10-18 RX ADMIN — Medication 2 UNITS: at 11:17

## 2022-10-18 RX ADMIN — CARVEDILOL 3.12 MG: 3.12 TABLET, FILM COATED ORAL at 16:57

## 2022-10-18 NOTE — H&P
PLEASE NOTE: I HAVE GENERATED THIS NOTE WITH THE ASSISTANCE OF VOICE-RECOGNITION TECHNOLOGY. PLEASE EXCUSE ANY SPELLING, GRAMMATICAL, AND SYNTAX ERRORS YOU MAY FIND. IF YOU NEED CLARIFICATION ON ANYTHING, PLEASE FEEL FREE TO REACH OUT TO ME.  Daniele Prince YOU        [facility]  History and Physical - Dr. Av John:     69 y.o. female with pertinent medical hx of morbid obesity, diabetes presented with chest pain with shortness of breath    VS relatively stable, except that she is 94% on 4 L nasal cannula    Physical exam revealed patient is not wheezing, but is otherwise comfortable    Labs revealed troponin of 1900; white count of 16    Imaging revealed centrilobular emphysema; questionable on atypical infectious process    Of note, left thyroid nodule measuring up to 4.5 cm,     Active Problems:    NSTEMI (non-ST elevated myocardial infarction) ([facility] [address] (10/17/2022)    ? Infectious Process    Plan:     Acute NSTEMI  - Heparin gtt  - ASA stat  - Cardio consult  - O2 PRN;   - Morphine PNR  - Nitrtes  - I added low dose co-reg  - C/w home lipitor, cozaar, ASA    ? Acute PNA, possibly 2/2 Mycobacterium  - Isolation  - AFB  - Quantiferon  - ID consult  - Fungitell    DM  - hold home Metformin and glipizide  - RISS - normal    Centrilobular Emphysema  - Duoneb; needs outpt LFT    If code status was discussed with the patient, then code status entered as per the orders: F                          Subjective:   Primary Care Provider: Ted Miranda MD  Date of Service:  See Date Note Was Originated  Chief Complaint: SOB    69 y.o. female presents with one day duration of abrupt onset, gradually worsening, severe, constant sob that is associated with a cough, remitted by nothing, exacerbated by nothing. Review of Systems:  12 point ROS obtained and otherwise negative, except as per HPI and above.   Past Medical History:   Diagnosis Date    Diabetes ([facility] [address]     Hypertension       Past Surgical History:   Procedure Laterality Date    COLONOSCOPY N/A 9/23/2020    COLONOSCOPY performed by Opal Lopes MD at [facility] ENDOSCOPY    COLONOSCOPY N/A 9/30/2022    COLONOSCOPY performed by Radha Edwards MD at [facility] ENDOSCOPY    COLONOSCOPY,ASHANTI AVINA,LEXI  9/23/2020         HX BREAST BIOPSY Left     40 yrs ago--neg    HX CATARACT REMOVAL Bilateral     HX HEENT      upper lid lift    HX ORTHOPAEDIC Bilateral     carpal tunnel release    HX OTHER SURGICAL      colonoscopy     Prior to Admission medications    Medication Sig Start Date End Date Taking? Authorizing Provider   calcium-cholecalciferol, d3, (CALCIUM 600 + D) 600-125 mg-unit tab Take  by mouth daily. Yes Provider, Historical   metFORMIN (GLUCOPHAGE) 1,000 mg tablet Take 1,000 mg by mouth two (2) times daily (with meals). Yes Provider, Historical   furosemide (LASIX) 20 mg tablet Take 20 mg by mouth as needed. Yes Provider, Historical   losartan (COZAAR) 50 mg tablet Take  by mouth daily. Yes Other, MD Mare   lovastatin (MEVACOR) 20 mg tablet Take 20 mg by mouth nightly. Yes Other, MD Mare   glimepiride (AMARYL) 1 mg tablet Take 2 mg by mouth every morning. Yes Other, MD Mare   aspirin 81 mg chewable tablet Take 81 mg by mouth daily. Yes Francisco J, MD Mare   DOCOSAHEXANOIC ACID/EPA (FISH OIL PO) Take 1 Cap by mouth two (2) times a day.   Patient not taking: Reported on 10/17/2022    Other, MD Mare     Allergies   Allergen Reactions    Bactrim [Sulfamethoprim] Hives and Itching      Family History   Problem Relation Age of Onset    Diabetes Mother     Hypertension Mother     Hypertension Father     Other Father         cerebral aneurysm    Diabetes Brother     Hypertension Brother     Lung Disease Brother     Diabetes Sister        Social History     Socioeconomic History    Marital status:    Tobacco Use    Smoking status: Former     Packs/day: 1.00     Years: 25.00     Pack years: 25.00     Types: Cigarettes     Quit date: 1999     Years since quittin.0    Smokeless tobacco: Never   Substance and Sexual Activity    Alcohol use: Never     Comment: none in 30 years    Drug use: Never      Objective:   Physical Exam:     VS as below    Const'l:          Morbidly obese body habitus, a&o, no acute distress  Head/Neck:       no cervical/head mass  Eyes:     nonicteric sclera, eom intact  ENT:      auditory acuity grossly intact either with or without device, no nasal deformity  Cardio:           tachycardic rate reg rhythm  Pulm:     no accessory muscle use  Abd:       sntnd   Derm:     no rashes, no ulcers, no lesions  Extr:      no edema, no cyanosis, no calf tenderness, no varicosities  Neuro:    cn II-XII intact  Psych:   mood intact, judgement intact    Data Review: All diagnostic labs and studies have been reviewed.

## 2022-10-18 NOTE — CONSULTS
Infectious Disease Consult    Date of Consultation:  October 18, 2022  Reason for Consultation: TB  Referring Physician: Dr Cindy Villalobos  Date of Admission:10/17/22    Impression    Acute pneumonia  Leukocytosis WBC 18.1  Concern for atypical mycobacteria based on CTA  findings of micro nodularity in lingula    CTA chest + Centrilobular emphysema. Possible interstitial prominence which could represent vascular congestion. Micronodularity in the lingula which may represent an atypical   infectious process such as mycobacterium. NSTEMI  Elevated troponin  Cardiology following-Per cardiology  Will need cardiac cath,  Plan for cardiac cath once TB status is confirmed. Diabetes M mellitus  A1c pending    Acute hypoxic respiratory failure   on 2 L O2     Emphysema   On Duonebs     Morbid obesity  BMI 40.89. Plan  -Continue cefepime and azithromycin  -Sputum AFB, QuantiFERON pending  -Respiratory culture. Atypical mycobacteria does not require airborne isolation, but since sputum AFB  has been ordered,  it would require isolation per protocol. This should not be a deterrent for an essential life-saving  procedure such as cardiac catheterization.    -Patient needs to be worked up for other causes of nodular lung lesions such as  -sarcoid , fungal  infections, granulomatous disease. Patient gives history of yard work prior to onset of symptoms. Legionella , fungal infections  would be in differential.  -Consider pulmonary input,? Bronchoscopy. Jermaine Duque is 69 y.o. female with PMHx significant for diabetes, hypertension, who presented with a chief complaint of cough and shortness of breath. Patient reported she has had a cough and URI symptoms for the last week which she attributed to allergies. She was at her primary care doctor on Thursday and was told to continue using Flonase and Zyrtec. She became acutely more short of breath today prompting her trip to the emergency department.   She denied associated shortness of breath or fever. No  known sick contacts. No abdominal pain, nausea, vomiting. WBC at time of admission 18.1, creatinine 0.87. CXR-diffuse bilateral interstitial markings representing interstitial pneumonia. CTA chest + FINDINGS:  CHEST:  Chest wall/thoracic inlet: Within normal limits. Thyroid: Thyroid nodule measuring approximately 4.5 x 3.1 cm at the thoracic  inlet which causes mild mass effect upon the trachea. Mediastinum/cameron: Patulous esophagus. Heart/vessels: Calcifications in the coronary arteries. Lungs/Pleura: Motion. Centrilobular emphysema. There is likely slight  interstitial prominence. There appear to be tiny nodules in the lingula. .  INCIDENTALLY IMAGED ABDOMEN:  Incidentally imaged portions of the abdomen appear unremarkable. .  MSK:   Small, fat-containing hernia in the medial, right hemidiaphragm. Kyphosis and  degenerative changes in the spine  . IMPRESSION  Limited exam  1. No visualized pulmonary embolus. 2. Centrilobular emphysema. 3. Possible interstitial prominence which could represent vascular congestion. 4. Query micronodularity in the lingula which may represent an atypical  infectious process such as mycobacterium. 5. Left thyroid nodule measuring up to 4.5 cm. Recommend nonemergent ultrasound  for further evaluation if not already performed at an outside facility. 6. Incidental findings as above. Patient has been admitted to hospitalist service. She is being treated for non-STEMI  ID service has been consulted to see patient. Patient seen today. States she started having a productive cough over the past week. Denies sick contacts. Patient states she had done some  Yard work prior to onset of symptoms. No past history of pneumonia. Patient is not aware of history of emphysema. Remote smoking history+. No history of chronic cough, weight loss. No history of TB, no contact history of TB.     Active Hospital Problems Diagnosis Date Noted    NSTEMI (non-ST elevated myocardial infarction) ([facility] 10/17/2022         Past Medical History:   Diagnosis Date    Diabetes ([facility]     Hypertension        Past Surgical History:   Procedure Laterality Date    COLONOSCOPY N/A 2020    COLONOSCOPY performed by Kristen Carlson MD at [facility] ENDOSCOPY    COLONOSCOPY N/A 2022    COLONOSCOPY performed by Nano John MD at [facility] ENDOSCOPY    COLONOSCOPY,REMDANIEL AVINA,SNARE  2020         HX BREAST BIOPSY Left     40 yrs ago--neg    HX CATARACT REMOVAL Bilateral     HX HEENT      upper lid lift    HX ORTHOPAEDIC Bilateral     carpal tunnel release    HX OTHER SURGICAL      colonoscopy       Allergies   Allergen Reactions    Bactrim [Sulfamethoprim] Hives and Itching       Social Connections: Not on file       Family Status   Relation Name Status    Mother      Father      Sister  Alive    Brother  Alive    Brother  Alive    Brother  Alive    Sister  Alive       Medication Documentation Review Audit       Reviewed by Maame Mai RN (Registered Nurse) on 10/17/22 at       Medication Sig Documenting Provider Last Dose Status Taking?   aspirin 81 mg chewable tablet Take 81 mg by mouth daily. Mare Marx MD 10/17/2022 Active Yes   calcium-cholecalciferol, d3, (CALCIUM 600 + D) 600-125 mg-unit tab Take  by mouth daily. Provider, Historical 10/17/2022 Active Yes   DOCOSAHEXANOIC ACID/EPA (FISH OIL PO) Take 1 Cap by mouth two (2) times a day. Patient not taking: Reported on 10/17/2022    Mare Marx MD Not Taking Active No   furosemide (LASIX) 20 mg tablet Take 20 mg by mouth as needed. Provider, Historical 10/10/2022 Active Yes   glimepiride (AMARYL) 1 mg tablet Take 2 mg by mouth every morning. Mare Marx MD 10/17/2022 Active Yes   losartan (COZAAR) 50 mg tablet Take  by mouth daily. Mare Marx MD 10/17/2022 Active Yes   lovastatin (MEVACOR) 20 mg tablet Take 20 mg by mouth nightly.  Mare Marx MD 10/16/2022 Active Yes   metFORMIN (GLUCOPHAGE) 1,000 mg tablet Take 1,000 mg by mouth two (2) times daily (with meals). Provider, Historical 10/17/2022 Active Yes                      Review of Systems - Negative except except those mentioned in H&P        PHYSICAL EXAM:  General:          Awake, cooperative, no acute distress    EENT:              EOMI. Anicteric sclerae. MMM  Resp:               CTA bilaterally, no wheezing or rales. No accessory muscle use  CV:                  Regular  rhythm,  No edema  GI:                   Soft, Non distended, Non tender. +Bowel sounds  Neurologic:      Alert and oriented X 3, normal speech,   Psych:             Good insight. Not anxious nor agitated  Skin:                No rashes. No jaundice.   Extremities: No edema    Giuseppe Lindsay MD [address]

## 2022-10-18 NOTE — PROGRESS NOTES
Hospitalist Progress Note    NAME: Jose Cheek   :  1945   MRN:  [id number]       Assessment / Plan:    NSTEMI  - Heparin gtt  - ASA stat  - Cardio consult  - supplemental O2 to keep sats >90%  - Morphine PNR  - Nitrtes  - lipitor, cozaar, ASA, coreg  -await [facility] once TB status is confirmed    Sepsis leukocytosis 15.9 and tachycardia 130  ? Acute PNA, possibly 2/2 Mycobacterium  - airborne Isolation  - Quantiferon gold and AFB pending  - ID consult  - Fungitell     DM  - hold home Metformin and glipizide  - RISS - normal     Centrilobular Emphysema  - Duoneb; needs outpt PFT    40 or above Morbid obesity / Body mass index is 40.89 kg/m². Estimated discharge date:   Barriers: await TB test results and cath    Code status: Full  Prophylaxis:  heparin gtt  Recommended Disposition: Home w/Family     Subjective:     Chief Complaint / Reason for Physician Visit  \"sob\". Discussed with RN events overnight. Sob improving, denies chest pain, cough, nausea or vomiting    Review of Systems:  Symptom Y/N Comments  Symptom Y/N Comments   Fever/Chills n   Chest Pain n    Poor Appetite    Edema     Cough n   Abdominal Pain n    Sputum    Joint Pain     SOB/VEGAS n   Pruritis/Rash     Nausea/vomit    Tolerating PT/OT     Diarrhea    Tolerating Diet     Constipation    Other       Could NOT obtain due to:      Objective:     VITALS:   Last 24hrs VS reviewed since prior progress note.  Most recent are:  Patient Vitals for the past 24 hrs:   Temp Pulse Resp BP SpO2   10/18/22 1547 -- -- -- -- 96 %   10/18/22 1306 -- -- -- -- 92 %   10/18/22 1112 97.6 °F (36.4 °C) 85 25 117/60 93 %   10/18/22 0917 -- -- -- -- 94 %   10/18/22 0823 97.7 °F (36.5 °C) 93 29 119/74 94 %   10/18/22 0613 -- -- -- -- 94 %   10/18/22 0335 97.7 °F (36.5 °C) 87 22 130/73 --   10/17/22 2307 98.7 °F (37.1 °C) 88 23 99/74 94 %   10/17/22 2030 98.7 °F (37.1 °C) 98 24 129/77 --   10/17/22 1930 98.4 °F (36.9 °C) (!) 102 20 124/81 92 %   10/17/22 1917 -- 99 15 124/81 92 %   10/17/22 1817 -- 99 23 127/69 94 %   10/17/22 1745 -- (!) 101 16 107/71 92 %   10/17/22 1647 -- (!) 103 23 (!) 130/92 94 %       Intake/Output Summary (Last 24 hours) at 10/18/2022 1623  Last data filed at 10/18/2022 1112  Gross per 24 hour   Intake 480 ml   Output 1100 ml   Net -620 ml        I had a face to face encounter and independently examined this patient on 10/18/2022, as outlined below:  PHYSICAL EXAM:  General:  Alert, obese no acute distress    Resp:  Bibasilar rales,  No accessory muscle use  CV:  Regular  rhythm,  No edema  GI:  Soft, Non distended, Non tender. +Bowel sounds  Neurologic:  Alert and oriented X 3, normal speech,   Psych:   Not anxious nor agitated  Skin:  No rashes. No jaundice    Reviewed most current lab test results and cultures  YES  Reviewed most current radiology test results   YES  Review and summation of old records today    NO  Reviewed patient's current orders and MAR    YES  PMH/ reviewed - no change compared to H&P  ________________________________________________________________________  Care Plan discussed with:    Comments   Patient x    Family      RN x    Care Manager     Consultant                       x Multidiciplinary team rounds were held today with , nursing, pharmacist and clinical coordinator. Patient's plan of care was discussed; medications were reviewed and discharge planning was addressed. ________________________________________________________________________  Total NON critical care TIME:  40   Minutes    Total CRITICAL CARE TIME Spent:   Minutes non procedure based      Comments   >50% of visit spent in counseling and coordination of care x    ________________________________________________________________________  Ricki Norton MD     Procedures: see electronic medical records for all procedures/Xrays and details which were not copied into this note but were reviewed prior to creation of Plan. LABS:  I reviewed today's most current labs and imaging studies.   Pertinent labs include:  Recent Labs     10/18/22  0651 10/17/22  1708 10/17/22  1427   WBC 13.1* 15.9* 18.1*   HGB 10.5* 10.8* 10.9*   HCT 34.8* 35.4 37.1    308 359     Recent Labs     10/18/22  0651 10/17/22  2343 10/17/22  1601 10/17/22  1427     --   --  136   K 4.4  --   --  4.1     --   --  102   CO2 24  --   --  24   *  --   --  161*   BUN 19  --   --  18   CREA 0.81  --   --  0.87   CA 10.5*  --   --  10.9*   MG 2.0 1.7  --   --    PHOS 3.1 3.7  --   --    ALB 3.7  --   --  4.0   TBILI 0.7  --   --  0.4   ALT 40  --   --  50   INR  --   --  1.0  --        Signed: Ty Blake MD

## 2022-10-18 NOTE — CONSULTS
[facility]  [address] [facility] [place] [address] [facility]  [phone]  [email or web address]       CARDIOLOGY CONSULTATION    PLEASE NOTE THAT WE CONFIRMED WITH THE REFERRING PHYSICIAN PRIOR TO SEEING THE PATIENT THAT THE PATIENT IS BEING REFERRED FOR INITIAL HOSPITAL EVALUATION AND FOR LONG-TERM ONGOING CARDIAC CARE     Date of  Admission: 10/17/2022  2:19 PM     Admission type:Emergency   Primary Care Physician:Alen Nichols MD     Attending Provider: Sam Mercedes MD  Cardiology Provider: None    CC/REASON FOR CONSULT: NSTEMI     Subjective:     Jennifer Hunter is a 69 y.o. female admitted for NSTEMI (non-ST elevated myocardial infarction) ([facility] [address] [I21.4]. She has a PMHx of DM2, HTN. She presented to ED [facility] with complaints of chest pain and shortness of breath. Pt accompanied her  to his appt at [facility] yesterday. Climbed up a few flights of stairs and then sat down while he was getting an iron infusion when she suddenly felt chest tightness. Felt her throat was a little scratchy so took a Cherry cough drop. Went downstairs to use the bathroom and started developing shortness of breath. Required assistance with wheelchair to come out of the bathroom. Symptoms improved, so she went home. However, chest pain persisted through the night and she started coughing up red tinged sputum, so came to the ED. In ED, noted to have elevated Trop 1700, WBC 16K. CXR with interstitial edema. Chest CTA with centriloblular emphysema with micronodularity which could represent mycobacterium. She was placed on isolation and AFB sent to r/o TB. COVID-19 negative. Started on heparin gtt and admitted for further evaluation. Cardiology consulted for management of NSTEMI. She reports pain resolved once she got up to the floor. Was given ASA and Pepcid. Still having some shortness of breath, now on supplemental O2.   She reports having cardiac workup done many years ago when she lived in [place], at [facility]. Has had a series of false positive stress tests. Last catheterization was before , and was negative for coronary disease. Has not followed with cardiology since.     Cardiac risk factors: smoking/ tobacco exposure, family history, diabetes mellitus, obesity, sedentary life style, hypertension, post-menopausal.      Patient Active Problem List    Diagnosis Date Noted    [facility] 10/17/20[facility]      Jill Sanchez MD  Past Medical History:   Diagnosis Date    Diabetes ([facility]     Hypertension       Social History     Socioeconomic History    Marital status:    Tobacco Use    Smoking status: Former     Packs/day: 1.00     Years: 25.00     Pack years: 25.00     Types: Cigarettes     Quit date: 1999     Years since quittin.0    Smokeless tobacco: Never   Substance and Sexual Activity    Alcohol use: Never     Comment: none in 30 years    Drug use: Never     Allergies   Allergen Reactions    Bactrim [Sulfamethoprim] Hives and Itching      Family History   Problem Relation Age of Onset    Diabetes Mother     Hypertension Mother     Hypertension Father     Other Father         cerebral aneurysm    Diabetes Brother     Hypertension Brother     Lung Disease Brother     Diabetes Sister       Current Facility-Administered Medications   Medication Dose Route Frequency    furosemide (LASIX) tablet 20 mg  20 mg Oral PRN    atorvastatin (LIPITOR) tablet 10 mg  10 mg Oral QHS    losartan (COZAAR) tablet 50 mg  50 mg Oral DAILY    albuterol-ipratropium (DUO-NEB) 2.5 MG-0.5 MG/3 ML  3 mL Nebulization Q4H RT    glucagon (GLUCAGEN) injection 1 mg  1 mg IntraMUSCular PRN    dextrose 10 % infusion 250 mL  250 mL IntraVENous DIALYSIS PRN    glucose chewable tablet 16 g  16 g Oral PRN    insulin lispro (HUMALOG) injection 1-8 Units  1-8 Units SubCUTAneous AC&HS    heparin 25,000 units in D5W 250 ml infusion  9.5-25 Units/kg/hr IntraVENous TITRATE    heparin (porcine) 1,000 unit/mL injection 2,000 Units  2,000 Units IntraVENous PRN    Or    heparin (porcine) 1,000 unit/mL injection 4,000 Units  4,000 Units IntraVENous PRN    morphine injection 1 mg  1 mg IntraVENous Q4H PRN    aspirin chewable tablet 81 mg  81 mg Oral DAILY    nitroglycerin (NITROSTAT) tablet 0.4 mg  0.4 mg SubLINGual PRN    carvediloL (COREG) tablet 3.125 mg  3.125 mg Oral BID WITH MEALS    famotidine (PF) (PEPCID) 20 mg in 0.9% sodium chloride 10 mL injection  20 mg IntraVENous Q12H        Review of Symptoms:   11 systems reviewed, negative other than as stated in the HPI        Objective:      Visit Vitals  /74   Pulse 93   Temp 97.7 °F (36.5 °C)   Resp 29   Ht 5' 3\" (1.6 m)   Wt 104.7 kg (230 lb 13.2 oz)   SpO2 94%   BMI 40.89 kg/m²         Physical Exam:  General: Well developed, in no acute distress, cooperative and alert  HEENT: No carotid bruits, PEERL, EOM intact. Heart:  reg rate and rhythm; normal S1/S2; no murmurs  Respiratory: mild expiratory wheezing upper anterior; on supplemental O2. Abdomen:   Soft, non-tender, no distention, no masses. + BS. Extremities:  Normal cap refill, no cyanosis, atraumatic. No edema.   Neuro: A&Ox3, speech clear  Skin: Skin color is normal. Non diaphoretic  Vascular: 2+ pulses symmetric in all extremities    Data Review:   Recent Labs     10/18/22  0651 10/17/22  1708 10/17/22  1427   WBC 13.1* 15.9* 18.1*   HGB 10.5* 10.8* 10.9*   HCT 34.8* 35.4 37.1    308 359     Recent Labs     10/18/22  0651 10/17/22  2343 10/17/22  1601 10/17/22  1427     --   --  136   K 4.4  --   --  4.1     --   --  102   CO2 24  --   --  24   *  --   --  161*   BUN 19  --   --  18   CREA 0.81  --   --  0.87   CA 10.5*  --   --  10.9*   MG 2.0 1.7  --   --    PHOS 3.1 3.7  --   --    ALB 3.7  --   --  4.0   TBILI 0.7  --   --  0.4   ALT 40  --   --  50   INR  --   --  1.0  --        No results for input(s): TROIQ, CPK, CKMB in the last 72 hours. Intake/Output Summary (Last 24 hours) at 10/18/2022 1118  Last data filed at 10/18/2022 5858  Gross per 24 hour   Intake 0 ml   Output 500 ml   Net -500 ml        Cardiographics    Telemetry: sinus rhythm, PVCs    ECG: sinus rhythm; 1st degree AV block    Echocardiogram: pending    Radiology Results in the past 24 hours:  XR CHEST PA LAT    Result Date: 10/17/2022  Diffuse bilateral increased interstitial markings, likely representing interstitial edema. CTA CHEST W OR W WO CONT    Result Date: 10/17/2022  Limited exam 1. No visualized pulmonary embolus. 2. Centrilobular emphysema. 3. Possible interstitial prominence which could represent vascular congestion. 4. Query micronodularity in the lingula which may represent an atypical infectious process such as mycobacterium. 5. Left thyroid nodule measuring up to 4.5 cm. Recommend nonemergent ultrasound for further evaluation if not already performed at an outside facility. 6. Incidental findings as above. Assessment:       Active Problems:    NSTEMI (non-ST elevated myocardial infarction) ([facility] [address] (10/17/2022)         Plan:     NSTEMI  Trops 1746 --> 1978 --> 1847  EKG non-ischemic; previous cath have been neg, however this was over 10 years ago. No further CP  Will need cardiac cath, however she is currently under isolation for TB rule out. AFB is pending. Plan for cardiac cath once TB status is confirmed. Check echo  Continue heparin gtt, BB, statin, ASA    Thank you for this consultation. We will continue to follow with you. Dominic Mayfield NP  CC:Edgar Nichols MD      [facility]             Patient seen, examined by me personally. Plan discussed as detailed. Agree with note as outlined by  NP with modifications as noted. My independent physical exam reveals : Physical Exam  Vitals and nursing note reviewed. Cardiovascular:      Rate and Rhythm: Normal rate and regular rhythm. Neurological:      Mental Status: She is alert. Psychiatric:         Mood and Affect: Mood normal.        No additional findings noted. Agree with plan as outlined above with modifications as noted. Continue heparin, ASA, betablocker. Echo, cath/PCI when off isolation.     Earl Ramirez MD

## 2022-10-18 NOTE — PROGRESS NOTES
Physical Therapy    Chart reviewed and orders acknowledged. Pt seen for PT screen, pt stating she is up independently in room from bed <> commode and bed <> chair. Pt reports she is at her mobility baseline and declines skilled PT interventions at this time. Education provided on importance of mobilizing with staff during hospital stay to maintain mobility, importance of ambulating in room as able with staff, and importance of up out of bed to maintain strength/mobility. Pt endorsing understanding. Thank you for your referral. Please re-consult if new mobility needs arise. Will sign off. Kelly Domínguez PT, DPT, NCS  Total time: 10 min unbilled.

## 2022-10-18 NOTE — PROGRESS NOTES
ADULT PROTOCOL: JET AEROSOL ASSESSMENT    Patient  Ariadne Lobo     69 y.o.   female     10/18/2022  7:53 PM    Breath Sounds Pre Procedure: Right Breath Sounds: Clear, Diminished                               Left Breath Sounds: Clear, Diminished    Breath Sounds Post Procedure: Right Breath Sounds: Clear, Diminished                                 Left Breath Sounds: Clear, Diminished    Breathing pattern: Pre procedure Breathing Pattern: Tachypneic          Post procedure Breathing Pattern: Tachypneic    Heart Rate: Pre procedure Pulse: 97           Post procedure Pulse: 92    Resp Rate: Pre procedure Respirations: 28           Post procedure Respirations: 26    Peak Flow: Pre bronchodilator             Post bronchodilator         Cough: Pre procedure                 Post procedure      Suctioned: NO    Sputum: Pre procedure                   Post procedure      Oxygen: O2 Device: Nasal cannula   Flow rate (L/min) 2LPM     Changed: YES    SpO2: Pre procedure SpO2: 95 %   with oxygen              Post procedure SpO2: 97 %  with oxygen    Nebulizer Therapy: Current medications Aerosolized Medications: DuoNeb      Changed: YES    Smoking History:  Former smoker    Problem List:   Patient Active Problem List   Diagnosis Code    [facility]non[facility]ST [facility]       Respiratory Therapist: Jaems High RT

## 2022-10-18 NOTE — PROGRESS NOTES
TRANSFER - IN REPORT:    [facility] (name) on Domenic Newby  being received from ED(unit) for routine progression of care      Report consisted of patients Situation, Background, Assessment and   Recommendations(SBAR). Information from the following report(s) SBAR was reviewed with the receiving nurse. Opportunity for questions and clarification was provided. Assessment completed upon patients arrival to unit and care assumed.

## 2022-10-18 NOTE — PROGRESS NOTES
Endurance Catheter insertion note     Inserted 20 G, 8 cm long  Endurance Extended Dwell Peripheral Catheter into right upper arm using ultrasound guidance and sterile technique. Positive blood return. Patient tolerated procedure well. Denies questions or concerns at this time. The Endurance catheter is an extended dwell peripheral catheter and may remain in place 29 days. Blood samples can be obtained from this catheter. To obtain labs, a tourniquet may be used, flush with 10ml NS, waste 3ml, draw labs, flush with 20ml NS. Dressings needs to be changed with central line dressing kit using bio patch every 7 days by nurse. Primary nurse        RN notified.         [address] [facility] Vascular Access Team. PICC Nurse

## 2022-10-18 NOTE — PROGRESS NOTES
0700:  Bedside shift change report given to Sarah Goodwin (oncoming nurse) by Kayleigh Zacarias (offgoing nurse). Report included the following information SBAR, Kardex, MAR, and Cardiac Rhythm NSR    1137:  Received verbal order for patient to resume diet. 1752:  Pt difficult stick. This RN and Jenny Stallings both attempted to draw labs x2. Unsuccessful. PICC team called and MD notified. 1825:  PICC team at bedside obtaining labs. End of Shift Note    Bedside shift change report given to Kayleigh Zacarias (oncoming nurse) by Trevin Bah RN (offgoing nurse). Report included the following information SBAR, Kardex, MAR, and Cardiac Rhythm NSR    Shift worked:  7a-7p     Shift summary and any significant changes:    See above     Concerns for physician to address:       Zone phone for oncoming shift:   [phone]       Activity:  Activity Level: Up with Assistance  Number times ambulated in hallways past shift: 0  Number of times OOB to chair past shift: 1    Cardiac:   Cardiac Monitoring: Yes      Cardiac Rhythm: Sinus Rhythm    Access:  Current line(s): PIV     Genitourinary:   Urinary status: voiding    Respiratory:   O2 Device: Nasal cannula  Chronic home O2 use?: NO  Incentive spirometer at bedside: NO       GI:  Last Bowel Movement Date: 10/17/22  Current diet:  ADULT DIET Regular; 5 carb choices (75 gm/meal)  DIET ONE TIME MESSAGE  Passing flatus: YES  Tolerating current diet: YES       Pain Management:   Patient states pain is manageable on current regimen: YES    Skin:  Neil Score: 21  Interventions: increase time out of bed and PT/OT consult    Patient Safety:  Fall Score:  Total Score: 2  Interventions: pt to call before getting OOB       Length of Stay:  Expected LOS: 4d 2h  Actual LOS: 1      Trevin Bah RN

## 2022-10-18 NOTE — PROGRESS NOTES
Problem: Falls - Risk of  Goal: *Absence of Falls  Description: Document Lemuel Rae Fall Risk and appropriate interventions in the flowsheet. Outcome: Progressing Towards Goal  Note: Fall Risk Interventions:            Medication Interventions: Bed/chair exit alarm, Evaluate medications/consider consulting pharmacy, Patient to call before getting OOB    Elimination Interventions: Call light in reach, Bed/chair exit alarm              Problem: Patient Education: Go to Patient Education Activity  Goal: Patient/Family Education  Outcome: Progressing Towards Goal     Problem: Hypertension  Goal: *Blood pressure within specified parameters  Outcome: Progressing Towards Goal  Goal: *Fluid volume balance  Outcome: Progressing Towards Goal  Goal: *Labs within defined limits  Outcome: Progressing Towards Goal     Problem: Patient Education: Go to Patient Education Activity  Goal: Patient/Family Education  Outcome: Progressing Towards Goal     Problem: Diabetes Self-Management  Goal: *Disease process and treatment process  Description: Define diabetes and identify own type of diabetes; list 3 options for treating diabetes. Outcome: Progressing Towards Goal  Goal: *Incorporating nutritional management into lifestyle  Description: Describe effect of type, amount and timing of food on blood glucose; list 3 methods for planning meals. Outcome: Progressing Towards Goal  Goal: *Incorporating physical activity into lifestyle  Description: State effect of exercise on blood glucose levels. Outcome: Progressing Towards Goal  Goal: *Developing strategies to promote health/change behavior  Description: Define the ABC's of diabetes; identify appropriate screenings, schedule and personal plan for screenings. Outcome: Progressing Towards Goal  Goal: *Using medications safely  Description: State effect of diabetes medications on diabetes; name diabetes medication taking, action and side effects.   Outcome: Progressing Towards Goal  Goal: *Monitoring blood glucose, interpreting and using results  Description: Identify recommended blood glucose targets  and personal targets. Outcome: Progressing Towards Goal  Goal: *Prevention, detection, treatment of acute complications  Description: List symptoms of hyper- and hypoglycemia; describe how to treat low blood sugar and actions for lowering  high blood glucose level. Outcome: Progressing Towards Goal  Goal: *Prevention, detection and treatment of chronic complications  Description: Define the natural course of diabetes and describe the relationship of blood glucose levels to long term complications of diabetes.   Outcome: Progressing Towards Goal  Goal: *Developing strategies to address psychosocial issues  Description: Describe feelings about living with diabetes; identify support needed and support network  Outcome: Progressing Towards Goal  Goal: *Insulin pump training  Outcome: Progressing Towards Goal  Goal: *Sick day guidelines  Outcome: Progressing Towards Goal  Goal: *Patient Specific Goal (EDIT GOAL, INSERT TEXT)  Outcome: Progressing Towards Goal     Problem: Patient Education: Go to Patient Education Activity  Goal: Patient/Family Education  Outcome: Progressing Towards Goal     Problem: Patient Education: Go to Patient Education Activity  Goal: Patient/Family Education  Outcome: Progressing Towards Goal     Problem: Unstable angina/NSTEMI: Day of Admission/Day 1  Goal: Off Pathway (Use only if patient is Off Pathway)  Outcome: Progressing Towards Goal  Goal: Activity/Safety  Outcome: Progressing Towards Goal  Goal: Consults, if ordered  Outcome: Progressing Towards Goal  Goal: Diagnostic Test/Procedures  Outcome: Progressing Towards Goal  Goal: Nutrition/Diet  Outcome: Progressing Towards Goal  Goal: Discharge Planning  Outcome: Progressing Towards Goal  Goal: Medications  Outcome: Progressing Towards Goal  Goal: Respiratory  Outcome: Progressing Towards Goal  Goal: Treatments/Interventions/Procedures  Outcome: Progressing Towards Goal  Goal: Psychosocial  Outcome: Progressing Towards Goal  Goal: *Hemodynamically stable  Outcome: Progressing Towards Goal  Goal: *Optimal pain control at patient's stated goal  Outcome: Progressing Towards Goal  Goal: *Lungs clear or at baseline  Outcome: Progressing Towards Goal     Problem: Unstable angina/NSTEMI: Day 2  Goal: Off Pathway (Use only if patient is Off Pathway)  Outcome: Progressing Towards Goal  Goal: Activity/Safety  Outcome: Progressing Towards Goal  Goal: Consults, if ordered  Outcome: Progressing Towards Goal  Goal: Diagnostic Test/Procedures  Outcome: Progressing Towards Goal  Goal: Nutrition/Diet  Outcome: Progressing Towards Goal  Goal: Discharge Planning  Outcome: Progressing Towards Goal  Goal: Medications  Outcome: Progressing Towards Goal  Goal: Respiratory  Outcome: Progressing Towards Goal  Goal: Treatments/Interventions/Procedures  Outcome: Progressing Towards Goal  Goal: Psychosocial  Outcome: Progressing Towards Goal  Goal: *Hemodynamically stable  Outcome: Progressing Towards Goal  Goal: *Optimal pain control at patient's stated goal  Outcome: Progressing Towards Goal  Goal: *Lungs clear or at baseline  Outcome: Progressing Towards Goal     Problem: Unstable Angina/NSTEMI: Discharge Outcomes  Goal: *Hemodynamically stable  Outcome: Progressing Towards Goal  Goal: *Stable cardiac rhythm  Outcome: Progressing Towards Goal  Goal: *Lungs clear or at baseline  Outcome: Progressing Towards Goal  Goal: *Optimal pain control at patient's stated goal  Outcome: Progressing Towards Goal  Goal: *Identifies cardiac risk factors  Outcome: Progressing Towards Goal  Goal: *Verbalizes home exercise program, activity guidelines, cardiac precautions  Outcome: Progressing Towards Goal  Goal: *Verbalizes understanding and describes prescribed diet  Outcome: Progressing Towards Goal  Goal: *Verbalizes name, dosage, time, side effects, and number of days to continue medications  Outcome: Progressing Towards Goal  Goal: *Anxiety reduced or absent  Outcome: Progressing Towards Goal  Goal: *Understands and describes signs and symptoms to report to providers(Stroke Metric)  Outcome: Progressing Towards Goal  Goal: *Describes follow-up/return visits to physicians  Outcome: Progressing Towards Goal  Goal: *Describes available resources and support systems  Outcome: Progressing Towards Goal  Goal: *Influenza immunization  Outcome: Progressing Towards Goal  Goal: *Pneumococcal immunization  Outcome: Progressing Towards Goal  Goal: *Describes smoking cessation resources  Outcome: Progressing Towards Goal     Problem: Risk for Spread of Infection  Goal: Prevent transmission of infectious organism to others  Description: Prevent the transmission of infectious organisms to other patients, staff members, and visitors.   Outcome: Progressing Towards Goal     Problem: Patient Education:  Go to Education Activity  Goal: Patient/Family Education  Outcome: Progressing Towards Goal

## 2022-10-18 NOTE — PROGRESS NOTES
Problem: Self Care Deficits Care Plan (Adult)  Goal: *Acute Goals and Plan of Care (Insert Text)  Description: FUNCTIONAL STATUS PRIOR TO ADMISSION: Patient was independent and active without use of DME. No baseline 02 usage. HOME SUPPORT: The patient lived with  but did not require assist.    Occupational Therapy Goals  Initiated 10/18/2022  1. Patient will perform grooming with independence within 7 day(s). 2.  Patient will perform lower body dressing with independence within 7 day(s). 3.  Patient will perform bathing with independence within 7 day(s). 4.  Patient will perform toilet transfers with independence within 7 day(s). 5.  Patient will perform all aspects of toileting with independence within 7 day(s). Outcome: Not Met     OCCUPATIONAL THERAPY EVALUATION  Patient: Merlene Dempsey (86 y.o. female)  Date: 10/18/2022  Primary Diagnosis: NSTEMI (non-ST elevated myocardial infarction) ([facility] [address] [I21.4]       Precautions:   (Airborne/Contact- TB rule out)    ASSESSMENT  Based on the objective data described below, the patient presents with decreased respiratory function (rec'd on 3 liters with resting 02 reading 95; therefore, weaned to 2 liters with 02 reading 93% s/p transfer from inclined to armchair; RN aware and following) and decreased higher level mobility/balance/activity tolerance, all of which limit pt's ability to safely complete self-care routine at level congruent with PLOF. Currently,  pt is Supervision for ADL completion without use of DME, benefiting from [address] verbal cues for PLB. Pt reports good in-home assist from , as well as, seated surface available within walk-in shower. Acute OT to follow x2/weekly to address energy conservation and PLB technique, with no anticipated future OT needs once discharged. Of note, VSS throughout evaluation/treatment. Current Level of Function Impacting Discharge (ADLs/self-care): Supervision    Functional Outcome Measure:   The patient scored 55/100 on the Barthel Index outcome measure. Other factors to consider for discharge: current 02 needs     Patient will benefit from skilled therapy intervention to address the above noted impairments. PLAN :  Recommendations and Planned Interventions: self care training, functional mobility training, therapeutic exercise, balance training, therapeutic activities, endurance activities, and patient education    Frequency/Duration: Patient will be followed by occupational therapy 2 times a week to address goals. Recommendation for discharge: (in order for the patient to meet his/her long term goals)  No skilled occupational therapy/ follow up rehabilitation needs identified at this time. This discharge recommendation:  Has not yet been discussed the attending provider and/or case management    IF patient discharges home will need the following DME: none       SUBJECTIVE:   Patient stated I feel much better today.     OBJECTIVE DATA SUMMARY:   HISTORY:   Past Medical History:   Diagnosis Date    Diabetes ([facility] [address]     Hypertension      Past Surgical History:   Procedure Laterality Date    COLONOSCOPY N/A 9/23/2020    COLONOSCOPY performed [facility]    COLONOSCOPY N/A 9/30/2022    COLONOSCOPY performed by Delores Hobson MD at [address] [place] [address],[address]  9/23/2020         HX BREAST BIOPSY Left     40 yrs ago--neg    HX CATARACT REMOVAL Bilateral     HX HEENT      upper lid lift    HX ORTHOPAEDIC Bilateral     carpal tunnel release    HX OTHER SURGICAL      colonoscopy       Expanded or extensive additional review of patient history:     Home Situation  Home Environment: Private residence  # Steps to Enter: 4  Rails to Enter: Yes  Hand Rails : Bilateral  One/Two Story Residence: One story  Living Alone: No  Support Systems: Spouse/Significant Other  Patient Expects to be Discharged to[de-identified] Home  Current DME Used/Available at Home: None, Shower chair  Tub or Shower Type: Shower    Hand dominance: Right    EXAMINATION OF PERFORMANCE DEFICITS:  Cognitive/Behavioral Status:  Neurologic State: Alert  Orientation Level: Oriented X4  Cognition: Appropriate decision making; Appropriate for age attention/concentration; Appropriate safety awareness  Perception: Appears intact  Perseveration: No perseveration noted  Safety/Judgement: Awareness of environment    Hearing: Auditory  Auditory Impairment: None    Vision/Perceptual:                                Corrective Lenses: Glasses    Range of Motion:  AROM: Within functional limits  PROM: Within functional limits                      Strength:  Strength: Within functional limits                Coordination:  Coordination: Within functional limits  Fine Motor Skills-Upper: Left Intact; Right Intact    Gross Motor Skills-Upper: Left Intact; Right Intact    Tone & Sensation:  Tone: Normal  Sensation: Intact                      Balance:  Sitting: Intact; Without support  Standing: Intact; Without support  Standing - Static: Good  Standing - Dynamic : Good    Functional Mobility and Transfers for ADLs:  Bed Mobility:  Rolling: Modified independent  Supine to Sit: Modified independent  Sit to Supine: Modified independent  Scooting: Modified independent    Transfers:  Sit to Stand: Supervision  Stand to Sit: Supervision  Bed to Chair: Supervision    ADL Assessment:  Feeding: Independent    Oral Facial Hygiene/Grooming: Supervision    Bathing: Supervision    Type of Bath: Chlorhexidine (CHG)    Upper Body Dressing: Independent    Lower Body Dressing: Supervision    Toileting: Supervision    ADL Intervention and task modifications:  Provided verbal cuing for education for breathing techniques including pursed lip breathing techniques (PLB) and circulatory breathing. Additionally, patient was educated on energy conservation techniques, including how they specifically apply to functional activities;  This includes pacing, rest breaks, and planning. Patient with good verbal understanding however needing additional cuing through out tasks to use techniques. Additional time needed during tasks. Pt educated on safe transfer techniques, with specific emphasis on proper hand placement to push up from seated surface rather than attempt to pull self up, fully positioning self in-front of desired seated location, feeling chair on back of legs and reaching back with 1-2 UE to slowly lower self to seated position. Type of Bath: Chlorhexidine (CHG)    Cognitive Retraining  Safety/Judgement: Awareness of environment    Functional Measure:    Barthel Index:  Bathin  Bladder: 10  Bowels: 10  Groomin  Dressin  Feeding: 10  Mobility: 0  Stairs: 0  Toilet Use: 5  Transfer (Bed to Chair and Back): 10  Total: 55/100      The Barthel ADL Index: Guidelines  1. The index should be used as a record of what a patient does, not as a record of what a patient could do. 2. The main aim is to establish degree of independence from any help, physical or verbal, however minor and for whatever reason. 3. The need for supervision renders the patient not independent. 4. A patient's performance should be established using the best available evidence. Asking the patient, friends/relatives and nurses are the usual sources, but direct observation and common sense are also important. However direct testing is not needed. 5. Usually the patient's performance over the preceding 24-48 hours is important, but occasionally longer periods will be relevant. 6. Middle categories imply that the patient supplies over 50 per cent of the effort. 7. Use of aids to be independent is allowed. Score Interpretation (from [address] [address])    Independent   60-79 Minimally independent   40-59 Partially dependent   20-39 Very dependent   <20 Totally dependent     -Kathie Calderón., Barthel, D.W. (1965). Functional evaluation: the Barthel Index.  [facility] (14)2.  -JIMENEZ Ballesteros, [address] (1997). The Barthel activities of daily living index: self-reporting versus actual performance in the old (> or = 75 years). Journal [address] 45(7), 14 [facility], DonyDonyDony, Adriana Castelan., Sandy Hailechai. (1999). Measuring the change in disability after inpatient rehabilitation; comparison of the responsiveness of the Barthel Index and Functional [place] Measure. Journal of Neurology, Neurosurgery, and Psychiatry, 66(4), 192-243. WESLEY Rajput, DAYNA Santos, & Yuridia Li M.A. (2004) Assessment of post-stroke quality of life in cost-effectiveness studies: The usefulness of the Barthel Index and the EuroQoL-5D. Quality of Life Research, 15, 754-15        Occupational Therapy Evaluation Charge Determination   History Examination Decision-Making   LOW Complexity : Brief history review  LOW Complexity : 1-3 performance deficits relating to physical, cognitive , or psychosocial skils that result in activity limitations and / or participation restrictions  LOW Complexity : No comorbidities that affect functional and no verbal or physical assistance needed to complete eval tasks       Based on the above components, the patient evaluation is determined to be of the following complexity level: LOW   Pain Rating:  No c/o pain    Activity Tolerance:   Fair, desaturates with exertion and requires oxygen, and requires rest breaks    After treatment patient left in no apparent distress:    Sitting in chair, Call bell within reach, and RN aware and following    COMMUNICATION/EDUCATION:   The patients plan of care was discussed with: Physical therapist and Registered nurse. Home safety education was provided and the patient/caregiver indicated understanding., Patient/family have participated as able in goal setting and plan of care. , and Patient/family agree to work toward stated goals and plan of care.     This patients plan of care is appropriate for delegation to ANNMARIE.     Thank you for this referral.  Yolanda Snyder, OT  Time Calculation: 31 mins

## 2022-10-18 NOTE — PROGRESS NOTES
End of Shift Note    Bedside shift change report given to  Isaias Fabian RN(oncoming nurse) by Flora Jean RN (offgoing nurse). Report included the following information SBAR    Shift worked:  7p-7a     Shift summary and any significant changes:    2000: transfer pt to floor    Adx data base completed; patient has already received Flu shot    2330: labs drawn, patient up to bedside commode becomes sob and wheezing; O2 at 4L and patient recovers. 0100: critical trop 1847; Dr. Nikki Cintron notified    0130: ptt 29.4 hep rate change 13.5u/kg/hr 4,000unit bolus verified with pharmacy recheck in 6hrs    Dr. Martínez Reyes placed atorvastatin orders pt wanting lovastatin (home med), notified Dr. Nikki Cintron; pharmacy does not have lovastatin; patient okay with taking atorvastatin    0400: Dr. Martínez Reyes placed duonebs q4hr patient has wheezing    0700: airborne precaution for TB rule out, quintiferon sent, AFB culture sent, ptt sent     Concerns for physician to address:       Zone phone for oncoming shift:          Activity:  Activity Level: Up with Assistance  Number times ambulated in hallways past shift: 0  Number of times OOB to chair past shift: 0    Cardiac:   Cardiac Monitoring: Yes      Cardiac Rhythm: Sinus Rhythm    Access:  Current line(s): PIV     Genitourinary:   Urinary status: voiding    Respiratory:   O2 Device: Nasal cannula  Chronic home O2 use?: NO  Incentive spirometer at bedside: NO       GI:  Last Bowel Movement Date: 10/17/22  Current diet:  DIET NPO  Passing flatus: YES  Tolerating current diet: YES       Pain Management:   Patient states pain is manageable on current regimen: YES    Skin:  Neil Score: 20  Interventions: float heels, increase time out of bed, foam dressing, and PT/OT consult    Patient Safety:  Fall Score:  Total Score: 2  Interventions: bed/chair alarm, assistive device (walker, cane, etc), gripper socks, pt to call before getting OOB, and stay with me (per policy)       Length of Stay:  Expected LOS: - - -  Actual LOS: 1      Kenny Wong RN

## 2022-10-18 NOTE — PROGRESS NOTES
Problem: Falls - Risk of  Goal: *Absence of Falls  Description: Document Shyannmikeysimón Cockayne Fall Risk and appropriate interventions in the flowsheet.   Outcome: Progressing Towards Goal  Note: Fall Risk Interventions:            Medication Interventions: Bed/chair exit alarm, Patient to call before getting OOB, Teach patient to arise slowly    Elimination Interventions: Bed/chair exit alarm, Call light in reach

## 2022-10-19 LAB
ANION GAP SERPL CALC-SCNC: 9 MMOL/L (ref 5–15)
APTT PPP: 28.8 SEC (ref 22.1–31)
APTT PPP: 47.2 SEC (ref 22.1–31)
APTT PPP: 81 SEC (ref 22.1–31)
BUN SERPL-MCNC: 18 MG/DL (ref 6–20)
BUN/CREAT SERPL: 21 (ref 12–20)
CALCIUM SERPL-MCNC: 9.4 MG/DL (ref 8.5–10.1)
CHLORIDE SERPL-SCNC: 103 MMOL/L (ref 97–108)
CO2 SERPL-SCNC: 23 MMOL/L (ref 21–32)
CREAT SERPL-MCNC: 0.87 MG/DL (ref 0.55–1.02)
EST. AVERAGE GLUCOSE BLD GHB EST-MCNC: 151 MG/DL
GLUCOSE BLD STRIP.AUTO-MCNC: 175 MG/DL (ref 65–117)
GLUCOSE BLD STRIP.AUTO-MCNC: 214 MG/DL (ref 65–117)
GLUCOSE BLD STRIP.AUTO-MCNC: 219 MG/DL (ref 65–117)
GLUCOSE BLD STRIP.AUTO-MCNC: 270 MG/DL (ref 65–117)
GLUCOSE SERPL-MCNC: 196 MG/DL (ref 65–100)
HBA1C MFR BLD: 6.9 % (ref 4–5.6)
M PNEUMO IGM SER IA-ACNC: NONREACTIVE
POTASSIUM SERPL-SCNC: 3.9 MMOL/L (ref 3.5–5.1)
PROCALCITONIN SERPL-MCNC: <0.05 NG/ML
SERVICE CMNT-IMP: ABNORMAL
SODIUM SERPL-SCNC: 135 MMOL/L (ref 136–145)
THERAPEUTIC RANGE,PTTT: ABNORMAL SECS (ref 58–77)
THERAPEUTIC RANGE,PTTT: ABNORMAL SECS (ref 58–77)
THERAPEUTIC RANGE,PTTT: NORMAL SECS (ref 58–77)
TROPONIN-HIGH SENSITIVITY: 1292 NG/L (ref 0–51)

## 2022-10-19 PROCEDURE — 36415 COLL VENOUS BLD VENIPUNCTURE: CPT

## 2022-10-19 PROCEDURE — 87385 HISTOPLASMA CAPSUL AG IA: CPT

## 2022-10-19 PROCEDURE — 74011636637 HC RX REV CODE- 636/637: Performed by: STUDENT IN AN ORGANIZED HEALTH CARE EDUCATION/TRAINING PROGRAM

## 2022-10-19 PROCEDURE — 84145 PROCALCITONIN (PCT): CPT

## 2022-10-19 PROCEDURE — 74011250636 HC RX REV CODE- 250/636: Performed by: EMERGENCY MEDICINE

## 2022-10-19 PROCEDURE — 84484 ASSAY OF TROPONIN QUANT: CPT

## 2022-10-19 PROCEDURE — 87449 NOS EACH ORGANISM AG IA: CPT

## 2022-10-19 PROCEDURE — 82962 GLUCOSE BLOOD TEST: CPT

## 2022-10-19 PROCEDURE — 74011000258 HC RX REV CODE- 258: Performed by: INTERNAL MEDICINE

## 2022-10-19 PROCEDURE — 74011250636 HC RX REV CODE- 250/636: Performed by: INTERNAL MEDICINE

## 2022-10-19 PROCEDURE — 99233 SBSQ HOSP IP/OBS HIGH 50: CPT | Performed by: INTERNAL MEDICINE

## 2022-10-19 PROCEDURE — 85730 THROMBOPLASTIN TIME PARTIAL: CPT

## 2022-10-19 PROCEDURE — 80048 BASIC METABOLIC PNL TOTAL CA: CPT

## 2022-10-19 PROCEDURE — 87116 MYCOBACTERIA CULTURE: CPT

## 2022-10-19 PROCEDURE — 65270000046 HC RM TELEMETRY

## 2022-10-19 PROCEDURE — 74011000250 HC RX REV CODE- 250: Performed by: INTERNAL MEDICINE

## 2022-10-19 PROCEDURE — 74011250637 HC RX REV CODE- 250/637: Performed by: STUDENT IN AN ORGANIZED HEALTH CARE EDUCATION/TRAINING PROGRAM

## 2022-10-19 PROCEDURE — 77010033678 HC OXYGEN DAILY

## 2022-10-19 PROCEDURE — 87899 AGENT NOS ASSAY W/OPTIC: CPT

## 2022-10-19 PROCEDURE — 94640 AIRWAY INHALATION TREATMENT: CPT

## 2022-10-19 RX ADMIN — CARVEDILOL 3.12 MG: 3.12 TABLET, FILM COATED ORAL at 09:32

## 2022-10-19 RX ADMIN — CEFEPIME 2 G: 2 INJECTION, POWDER, FOR SOLUTION INTRAVENOUS at 04:12

## 2022-10-19 RX ADMIN — IPRATROPIUM BROMIDE AND ALBUTEROL SULFATE 3 ML: .5; 3 SOLUTION RESPIRATORY (INHALATION) at 02:24

## 2022-10-19 RX ADMIN — Medication 3 UNITS: at 09:33

## 2022-10-19 RX ADMIN — HEPARIN SODIUM 18.5 UNITS/KG/HR: 10000 INJECTION, SOLUTION INTRAVENOUS at 18:21

## 2022-10-19 RX ADMIN — HEPARIN SODIUM 22.5 UNITS/KG/HR: 10000 INJECTION, SOLUTION INTRAVENOUS at 20:45

## 2022-10-19 RX ADMIN — CEFEPIME 2 G: 2 INJECTION, POWDER, FOR SOLUTION INTRAVENOUS at 21:28

## 2022-10-19 RX ADMIN — AZITHROMYCIN MONOHYDRATE 500 MG: 500 INJECTION, POWDER, LYOPHILIZED, FOR SOLUTION INTRAVENOUS at 20:33

## 2022-10-19 RX ADMIN — Medication 2 UNITS: at 21:41

## 2022-10-19 RX ADMIN — IPRATROPIUM BROMIDE AND ALBUTEROL SULFATE 3 ML: .5; 3 SOLUTION RESPIRATORY (INHALATION) at 14:31

## 2022-10-19 RX ADMIN — HEPARIN SODIUM 19.5 UNITS/KG/HR: 10000 INJECTION, SOLUTION INTRAVENOUS at 00:02

## 2022-10-19 RX ADMIN — HEPARIN SODIUM 4000 UNITS: 1000 INJECTION INTRAVENOUS; SUBCUTANEOUS at 20:46

## 2022-10-19 RX ADMIN — CARVEDILOL 3.12 MG: 3.12 TABLET, FILM COATED ORAL at 18:02

## 2022-10-19 RX ADMIN — ATORVASTATIN CALCIUM 10 MG: 10 TABLET, FILM COATED ORAL at 21:28

## 2022-10-19 RX ADMIN — ASPIRIN 81 MG CHEWABLE TABLET 81 MG: 81 TABLET CHEWABLE at 09:31

## 2022-10-19 RX ADMIN — Medication 5 UNITS: at 12:15

## 2022-10-19 RX ADMIN — HEPARIN SODIUM 18.5 UNITS/KG/HR: 10000 INJECTION, SOLUTION INTRAVENOUS at 13:28

## 2022-10-19 RX ADMIN — CEFEPIME 2 G: 2 INJECTION, POWDER, FOR SOLUTION INTRAVENOUS at 14:39

## 2022-10-19 RX ADMIN — LOSARTAN POTASSIUM 50 MG: 25 TABLET, FILM COATED ORAL at 09:34

## 2022-10-19 RX ADMIN — Medication 2 UNITS: at 18:06

## 2022-10-19 NOTE — PROGRESS NOTES
[facility]  [address]  [phone]  [email or web address]         Cardiology Progress Note      10/19/2022 8:40 AM    Admit Date: 10/17/2022    Admit Diagnosis:   NSTEMI (non-ST elevated myocardial infarction) ([address] [I21.4]    Interval History/Subjective:     Sarah Powers has been doing well. No acute events overnight.     Visit Vitals  /64   Pulse 86   Temp 98 °F (36.7 °C)   Resp 21   Ht 5' 3\" (1.6 m)   Wt 104.7 kg (230 lb 13.2 oz)   SpO2 91%   BMI 40.89 kg/m²       Current Facility-Administered Medications   Medication Dose Route Frequency    furosemide (LASIX) tablet 20 mg  20 mg Oral PRN    atorvastatin (LIPITOR) tablet 10 mg  10 mg Oral QHS    losartan (COZAAR) tablet 50 mg  50 mg Oral DAILY    glucagon (GLUCAGEN) injection 1 mg  1 mg IntraMUSCular PRN    dextrose 10 % infusion 250 mL  250 mL IntraVENous DIALYSIS PRN    glucose chewable tablet 16 g  16 g Oral PRN    insulin lispro (HUMALOG) injection 1-8 Units  1-8 Units SubCUTAneous AC&HS    azithromycin (ZITHROMAX) 500 mg in 0.9% sodium chloride 250 mL ([id number])  500 mg IntraVENous Q24H    cefepime (MAXIPIME) 2 g in 0.9% sodium chloride (MBP/ADV) 100 mL MBP  2 g IntraVENous Q8H    albuterol-ipratropium (DUO-NEB) 2.5 MG-0.5 MG/3 ML  3 mL Nebulization Q6H RT    heparin 25,000 units in D5W 250 ml infusion  9.5-25 Units/kg/hr IntraVENous TITRATE    heparin (porcine) 1,000 unit/mL injection 2,000 Units  2,000 Units IntraVENous PRN    Or    heparin (porcine) 1,000 unit/mL injection 4,000 Units  4,000 Units IntraVENous PRN    morphine injection 1 mg  1 mg IntraVENous Q4H PRN    aspirin chewable tablet 81 mg  81 mg Oral DAILY    nitroglycerin (NITROSTAT) tablet 0.4 mg  0.4 mg SubLINGual PRN    carvediloL (COREG) tablet 3.125 mg  3.125 mg Oral BID WITH MEALS       Objective:      Physical Exam:  General: Well developed, in no acute distress, cooperative and alert  HEENT: No carotid bruits, PEERL, EOM intact. Heart:  reg rate and rhythm; normal S1/S2; no murmurs  Respiratory: Clear bilaterally x 4, no wheezing or rales  Abdomen:   Soft, non-tender, no distention, no masses. + BS. Extremities:  Normal cap refill, no cyanosis, atraumatic. No edema. Neuro: A&Ox3, speech clear  Skin: Skin color is normal. Non diaphoretic  Vascular: 2+ pulses symmetric in all extremities    Data Review:   Recent Labs     10/18/22  0651 10/17/22  1708 10/17/22  1427   WBC 13.1* 15.9* 18.1*   HGB 10.5* 10.8* 10.9*   HCT 34.8* 35.4 37.1    308 359     Recent Labs     10/19/22  0433 10/18/22  0651 10/17/22  2343 10/17/22  1601 10/17/22  1427   * 137  --   --  136   K 3.9 4.4  --   --  4.1    102  --   --  102   CO2 23 24  --   --  24   * 216*  --   --  161*   BUN 18 19  --   --  18   CREA 0.87 0.81  --   --  0.87   CA 9.4 10.5*  --   --  10.9*   MG  --  2.0 1.7  --   --    PHOS  --  3.1 3.7  --   --    ALB  --  3.7  --   --  4.0   TBILI  --  0.7  --   --  0.4   ALT  --  40  --   --  50   INR  --   --   --  1.0  --        No results for input(s): TROIQ, CPK, CKMB in the last 72 hours. Intake/Output Summary (Last 24 hours) at 10/19/2022 0840  Last data filed at 10/18/2022 1112  Gross per 24 hour   Intake 480 ml   Output 600 ml   Net -120 ml        Telemetry: sinus rhythm    Echocardiogram: pending    Radiology Results in the last 24 hours:  No results found.         Assessment:     Active Problems:    NSTEMI (non-ST elevated myocardial infarction) ([facility] (10/17/2022)        Plan:     NSTEMI  Trops peaked 2018, now downtrending  Will need cardiac cath once she is off airborne isolation  Echo pending  Continue heparin gtt, BB, statin, ASA    Bella Méndez, NP

## 2022-10-19 NOTE — PROGRESS NOTES
End of Shift Note    Bedside shift change report given to López RN (oncoming nurse) by Pawan Keller RN (offgoing nurse). Report included the following information SBAR    Shift worked:  7p-7a     Shift summary and any significant changes:    Patient becomes very tachy with getting up to bedside commode sustaining in 130s take awhile for hr to come back down. Placed pt on purwick for the night to help prevent getting up out of bed as while trying to sleep and not elevate heart rate. Explained to patient she does not need to use during day. Urine sent (ID labs)    1941: critical trop 2018; NP notified, labs ordered  2200: ptt 39.1 give 4,000 bolus, increase rate by 4units rate change 19.5u/kg/hr recheck in 6hrs verified with pharmacy  2151: critical trop 2018; NP notified   0530: ptt 81 decrease rate to 17.5u/kg recheck in 6hrs; verified with pharmacy  0530: critical trop 1292; NP notified      Next ptt due 1130       Concerns for physician to address:    Pulm consult? Zone phone for oncoming shift:          Activity:  Activity Level: Up with Assistance  Number times ambulated in hallways past shift: 0  Number of times OOB to chair past shift: 0    Cardiac:   Cardiac Monitoring: Yes      Cardiac Rhythm: Sinus Rhythm, PVC    Access:  Current line(s): PIV     Genitourinary:   Urinary status: voiding    Respiratory:   O2 Device: Nasal cannula  Chronic home O2 use?: NO  Incentive spirometer at bedside: NO       GI:  Last Bowel Movement Date: 10/17/22  Current diet:  ADULT DIET Regular; 5 carb choices (75 gm/meal)  DIET ONE TIME MESSAGE  Passing flatus: YES  Tolerating current diet: YES       Pain Management:   Patient states pain is manageable on current regimen: YES    Skin:  Neil Score: 21  Interventions: float heels, increase time out of bed, and internal/external urinary devices    Patient Safety:  Fall Score:  Total Score: 2  Interventions: bed/chair alarm, assistive device (walker, cane, etc), gripper socks, pt to call before getting OOB, and stay with me (per policy)       Length of Stay:  Expected LOS: 4d 2h  Actual LOS: 2      Rom Echevarria, RN

## 2022-10-19 NOTE — CONSULTS
Pulmonary, Critical Care, and Sleep Medicine    Initial Patient Consult    Name: Domenic Newby MRN: [id number]   : 1945 Hospital: [facility]   Date: 10/19/2022                IMPRESSION:   Acute Hypoxic Respiratory Failure, pox was 97% on 3L. COPD/ Emphysematous with an acute decompensation, Has cough and wheezing. changes on CT scan imaging. Leukocytosis. Anemia: hgb of 10.5. May be ILD versus Pulmonary Edema  Micronodules of the lingula, may be atypical AFB, low procalcitonin. This does not appear to be the etiology of her acute symptoms. Pt can be ruled out for M TB. NO need for bronch at this point. Left thyroid nodule measures 4.5cm. No Pulmonary Embolus  Increased troponin. Type 2 DM  Obesity. Ex smoker, started smoking at age of 22. Quit about 20 year ago. RECOMMENDATIONS:   ON Duonebs  Adjust oxygen to keep pox over 90%. ON Cefepime and Azithromycin  No need for bronch at this point. ON heparin drip for acute MI.   NO need for steroids at this point. PCP: Simone      Subjective: This patient has been seen and evaluated at the request of Dr. Jaimie Fine for above. Patient is a 69 y.o. female who presents for above. Has been coughing, congested, noted to have worsening dyspnea. Has concerned for allergies. Cough has been worse for 3 weeks. Has been having some chest pain. NO fevers, no chills, no sweats. NO issues with aspiration. Has a hx of DM, followed by  Dr. Viet Hickman. Pt has been with coughing and congestion.        Past Medical History:   Diagnosis Date    Diabetes [facility])     Hypertension       Past Surgical History:   Procedure Laterality Date    COLONOSCOPY N/A 2020    COLONOSCOPY performed by Kenna Leblanc MD at [facility] ENDOSCOPY    COLONOSCOPY N/A 2022    COLONOSCOPY performed by Daniel Cevallos MD at [facility] ENDOSCOPY    COLONOSCOPY,ASHANTI AVINA,LEXI  2020         HX BREAST BIOPSY Left     40 yrs ago--neg    HX CATARACT REMOVAL Bilateral     HX HEENT      upper lid lift    HX ORTHOPAEDIC Bilateral     carpal tunnel release    HX OTHER SURGICAL      colonoscopy      Prior to Admission medications    Medication Sig Start Date End Date Taking? Authorizing Provider   calcium-cholecalciferol, d3, (CALCIUM 600 + D) 600-125 mg-unit tab Take  by mouth daily. Yes Provider, Historical   metFORMIN (GLUCOPHAGE) 1,000 mg tablet Take 1,000 mg by mouth two (2) times daily (with meals). Yes Provider, Historical   furosemide (LASIX) 20 mg tablet Take 20 mg by mouth as needed. Yes Provider, Historical   losartan (COZAAR) 50 mg tablet Take  by mouth daily. Yes Other, MD Mare   lovastatin (MEVACOR) 20 mg tablet Take 20 mg by mouth nightly. Yes Francisco J, MD Mare   glimepiride (AMARYL) 1 mg tablet Take 2 mg by mouth every morning. Yes Francisco J, MD Mare   aspirin 81 mg chewable tablet Take 81 mg by mouth daily. Yes Francisco J, MD Mare   DOCOSAHEXANOIC ACID/EPA (FISH OIL PO) Take 1 Cap by mouth two (2) times a day.   Patient not taking: Reported on 10/17/2022    OtherMare MD     Allergies   Allergen Reactions    Bactrim [Sulfamethoprim] Hives and Itching      Social History     Tobacco Use    Smoking status: Former     Packs/day: 1.00     Years: 25.00     Pack years: 25.00     Types: Cigarettes     Quit date: 1999     Years since quittin.0    Smokeless tobacco: Never   Substance Use Topics    Alcohol use: Never     Comment: none in 27 years      Family History   Problem Relation Age of Onset    Diabetes Mother     Hypertension Mother     Hypertension Father     Other Father         cerebral aneurysm    Diabetes Brother     Hypertension Brother     Lung Disease Brother     Diabetes Sister         Current Facility-Administered Medications   Medication Dose Route Frequency    atorvastatin (LIPITOR) tablet 10 mg  10 mg Oral QHS    losartan (COZAAR) tablet 50 mg  50 mg Oral DAILY    insulin lispro (HUMALOG) injection 1-8 Units  1-8 Units SubCUTAneous AC&HS    azithromycin (ZITHROMAX) 500 mg in 0.9% sodium chloride 250 mL ([id number])  500 mg IntraVENous Q24H    cefepime (MAXIPIME) 2 g in 0.9% sodium chloride (MBP/ADV) 100 mL MBP  2 g IntraVENous Q8H    albuterol-ipratropium (DUO-NEB) 2.5 MG-0.5 MG/3 ML  3 mL Nebulization Q6H RT    heparin 25,000 units in D5W 250 ml infusion  9.5-25 Units/kg/hr IntraVENous TITRATE    aspirin chewable tablet 81 mg  81 mg Oral DAILY    carvediloL (COREG) tablet 3.125 mg  3.125 mg Oral BID WITH MEALS       Review of Systems:  Constitutional: positive for fatigue and malaise  Eyes: negative  Ears, nose, mouth, throat, and face: positive for nasal congestion  Respiratory: positive for cough, sputum, wheezing, dyspnea on exertion, or chronic bronchitis  Cardiovascular: positive for chest pain, chest pressure/discomfort, fatigue, paroxysmal nocturnal dyspnea, lower extremity edema, tachypnea, dyspnea on exertion  Gastrointestinal: positive for dyspepsia  Genitourinary:negative  Integument/breast: negative  Hematologic/lymphatic: negative  Musculoskeletal:negative  Neurological: negative  Behavioral/Psych: negative  Endocrine: negative  Allergic/Immunologic: negative    Objective:   Vital Signs:    Visit Vitals  /89   Pulse 80   Temp 99 °F (37.2 °C)   Resp 24   Ht 5' 3\" (1.6 m)   Wt 104.7 kg (230 lb 13.2 oz)   SpO2 97%   BMI 40.89 kg/m²       O2 Device: Nasal cannula   O2 Flow Rate (L/min): 3 l/min   Temp (24hrs), Av.4 °F (36.9 °C), Min:98 °F (36.7 °C), Max:99 °F (37.2 °C)       Intake/Output:   Last shift:      No intake/output data recorded. Last 3 shifts: 10/17 1901 - 10/19 0700  In: 480 [P.O.:480]  Out: 1100 [Urine:1100]  No intake or output data in the 24 hours ending 10/19/22 1334   Physical Exam:   General:  Alert, cooperative, no distress, appears stated age. Sitting up in chair. Head:  Normocephalic, without obvious abnormality, atraumatic. Eyes:  Conjunctivae/corneas clear. PERRL, EOMs intact.    Nose: Nares normal. Septum midline. Mucosa normal. No drainage or sinus tenderness. Throat: Lips, mucosa, and tongue normal. Teeth and gums normal.   Neck: Supple, symmetrical, trachea midline, no adenopathy, thyroid: no enlargment/tenderness/nodules, no carotid bruit and no JVD. Back:   Symmetric, no curvature. ROM normal.   Lungs:   Clear to auscultation bilaterally. Has some some coughing or congestion. Chest wall:  No tenderness or deformity. Heart:  Regular rate and rhythm, S1, S2 normal, no murmur, click, rub or gallop. Abdomen:   Soft, non-tender. Bowel sounds normal. No masses,  No organomegaly. Obese. Extremities: Extremities normal, atraumatic, no cyanosis or edema. Pulses: 2+ and symmetric all extremities. Skin: Skin color, texture, turgor normal. No rashes or lesions   Lymph nodes: Cervical, supraclavicular, and axillary nodes normal.   Neurologic: Grossly nonfocal, motor is intact.       Data review:     Recent Results (from the past 24 hour(s))   MYCOPLASMA AB, IGM    Collection Time: 10/18/22  4:15 PM   Result Value Ref Range    Mycoplasma Ab, IgM NONREACTIVE NR     GLUCOSE, POC    Collection Time: 10/18/22  4:52 PM   Result Value Ref Range    Glucose (POC) 218 (H) 65 - 117 mg/dL    Performed by Jodie Montemayor PCT    PTT    Collection Time: 10/18/22  6:28 PM   Result Value Ref Range    aPTT 39.1 (H) 22.1 - 31.0 sec    aPTT, therapeutic range     58.0 - 77.0 SECS   TROPONIN-HIGH SENSITIVITY    Collection Time: 10/18/22  6:28 PM   Result Value Ref Range    Troponin-High Sensitivity 2,018 (HH) 0 - 51 ng/L   TROPONIN-HIGH SENSITIVITY    Collection Time: 10/18/22  8:22 PM   Result Value Ref Range    Troponin-High Sensitivity 2,018 (HH) 0 - 51 ng/L   GLUCOSE, POC    Collection Time: 10/18/22  9:09 PM   Result Value Ref Range    Glucose (POC) 222 (H) 65 - 117 mg/dL    Performed by Uriel Duque (PCT)    PTT    Collection Time: 10/19/22  4:33 AM   Result Value Ref Range    aPTT 81.0 (H) 22.1 - 31.0 sec    aPTT, therapeutic range     58.0 - 96.2 SECS   METABOLIC PANEL, BASIC    Collection Time: 10/19/22  4:33 AM   Result Value Ref Range    Sodium 135 (L) 136 - 145 mmol/L    Potassium 3.9 3.5 - 5.1 mmol/L    Chloride 103 97 - 108 mmol/L    CO2 23 21 - 32 mmol/L    Anion gap 9 5 - 15 mmol/L    Glucose 196 (H) 65 - 100 mg/dL    BUN 18 6 - 20 MG/DL    Creatinine 0.87 0.55 - 1.02 MG/DL    BUN/Creatinine ratio 21 (H) 12 - 20      eGFR >60 >60 ml/min/1.73m2    Calcium 9.4 8.5 - 10.1 MG/DL   TROPONIN-HIGH SENSITIVITY    Collection Time: 10/19/22  4:33 AM   Result Value Ref Range    Troponin-High Sensitivity 1,292 (HH) 0 - 51 ng/L   PROCALCITONIN    Collection Time: 10/19/22  4:33 AM   Result Value Ref Range    Procalcitonin <0.05 ng/mL   GLUCOSE, POC    Collection Time: 10/19/22  8:50 AM   Result Value Ref Range    Glucose (POC) 214 (H) 65 - 117 mg/dL    Performed by Alejandro Hickey RN    GLUCOSE, POC    Collection Time: 10/19/22 11:07 AM   Result Value Ref Range    Glucose (POC) 270 (H) 65 - 117 mg/dL    Performed by Dwight Spencer PCT    PTT    Collection Time: 10/19/22 12:05 PM   Result Value Ref Range    aPTT 47.2 (H) 22.1 - 31.0 sec    aPTT, therapeutic range     58.0 - 77.0 SECS       Imaging:  I have personally reviewed the patients radiographs and have reviewed the reports:  10-17-22: CT of chest:   IMPRESSION    1. No visualized pulmonary embolus. 2. Centrilobular emphysema. 3. Possible interstitial prominence which could represent vascular congestion. 4. Query micronodularity in the lingula which may represent an atypical  infectious process such as mycobacterium. 5. Left thyroid nodule measuring up to 4.5 cm. Recommend nonemergent ultrasound  for further evaluation if not already performed at an outside facility. 6. Incidental findings as above.         Braeden Ventura MD

## 2022-10-19 NOTE — PROGRESS NOTES
Hospitalist Progress Note    NAME: Bettie Croft   :  1945   MRN:  [id number]       Assessment / Plan:    NSTEMI  - Heparin gtt  - ASA stat  - Cardio consult  - supplemental O2 to keep sats >90%  - currently on 2L O2  - Morphine PNR  - Nitrtes  - lipitor, cozaar, ASA, coreg  -await [facility] once TB status is confirmed    Sepsis leukocytosis 15.9 and tachycardia 130  ? Acute PNA, possibly 2/2 Mycobacterium  - airborne Isolation  - Quantiferon gold and AFB pending  -on cefepime and azithro  - no indication for bronch at this time  - ID consult  - Atypical mycobacteria does not require airborne isolation,    AFB has been ordered, so it would require isolation per protocol. DM  - hold home Metformin and glipizide  - RISS - normal     Centrilobular Emphysema  - Duoneb; needs outpt PFT    40 or above Morbid obesity / Body mass index is 40.89 kg/m². Estimated discharge date:   Barriers: await TB test results and cath    Code status: Full  Prophylaxis:  heparin gtt  Recommended Disposition: Home w/Family     Subjective:     Chief Complaint / Reason for Physician Visit  \"sob\". Discussed with RN events overnight. Sob improving, denies chest pain, cough, nausea or vomiting    Review of Systems:  Symptom Y/N Comments  Symptom Y/N Comments   Fever/Chills n   Chest Pain n    Poor Appetite    Edema     Cough n   Abdominal Pain n    Sputum    Joint Pain     SOB/VEGAS n   Pruritis/Rash     Nausea/vomit    Tolerating PT/OT     Diarrhea    Tolerating Diet     Constipation    Other       Could NOT obtain due to:      Objective:     VITALS:   Last 24hrs VS reviewed since prior progress note.  Most recent are:  Patient Vitals for the past 24 hrs:   Temp Pulse Resp BP SpO2   10/19/22 1432 -- -- -- -- 97 %   10/19/22 1203 99 °F (37.2 °C) 80 24 129/89 97 %   10/19/22 0849 -- 87 23 124/80 94 %   10/19/22 0359 98 °F (36.7 °C) 86 21 125/64 --   10/19/22 0333 -- 77 26 -- 91 %   10/19/22 0250 98 °F (36.7 °C) (!) 125 28 (!) 118/55 90 %   10/19/22 0224 -- -- -- -- 96 %   10/19/22 0030 98.6 °F (37 °C) (!) 110 23 (!) 131/92 93 %   10/18/22 2347 -- (!) 122 25 -- 91 %   10/18/22 2016 98.5 °F (36.9 °C) 90 23 124/65 93 %   10/18/22 1940 -- -- -- -- 97 %         Intake/Output Summary (Last 24 hours) at 10/19/2022 1653  Last data filed at 10/19/2022 1445  Gross per 24 hour   Intake --   Output 400 ml   Net -400 ml          I had a face to face encounter and independently examined this patient on 10/19/2022, as outlined below:  PHYSICAL EXAM:  General:  Alert, obese no acute distress    Resp:  Bibasilar rales,  No accessory muscle use  CV:  Regular  rhythm,  No edema  GI:  Soft, Non distended, Non tender. +Bowel sounds  Neurologic:  Alert and oriented X 3, normal speech,   Psych:   Not anxious nor agitated  Skin:  No rashes. No jaundice    Reviewed most current lab test results and cultures  YES  Reviewed most current radiology test results   YES  Review and summation of old records today    NO  Reviewed patient's current orders and MAR    YES  PMH/ reviewed - no change compared to H&P  ________________________________________________________________________  Care Plan discussed with:    Comments   Patient x    Family      RN x    Care Manager     Consultant                       x Multidiciplinary team rounds were held today with , nursing, pharmacist and clinical coordinator. Patient's plan of care was discussed; medications were reviewed and discharge planning was addressed.      ________________________________________________________________________  Total NON critical care TIME:  40   Minutes    Total CRITICAL CARE TIME Spent:   Minutes non procedure based      Comments   >50% of visit spent in counseling and coordination of care x    ________________________________________________________________________  Marci Stinson MD     Procedures: see electronic medical records for all procedures/Xrays and details which were not copied into this note but were reviewed prior to creation of Plan. LABS:  I reviewed today's most current labs and imaging studies.   Pertinent labs include:  Recent Labs     10/18/22  0651 10/17/22  1708 10/17/22  1427   WBC 13.1* 15.9* 18.1*   HGB 10.5* 10.8* 10.9*   HCT 34.8* 35.4 37.1    308 359       Recent Labs     10/19/22  0433 10/18/22  0651 10/17/22  2343 10/17/22  1601 10/17/22  1427   * 137  --   --  136   K 3.9 4.4  --   --  4.1    102  --   --  102   CO2 23 24  --   --  24   * 216*  --   --  161*   BUN 18 19  --   --  18   CREA 0.87 0.81  --   --  0.87   CA 9.4 10.5*  --   --  10.9*   MG  --  2.0 1.7  --   --    PHOS  --  3.1 3.7  --   --    ALB  --  3.7  --   --  4.0   TBILI  --  0.7  --   --  0.4   ALT  --  40  --   --  50   INR  --   --   --  1.0  --          Signed: Malick Lazo MD

## 2022-10-19 NOTE — PROGRESS NOTES
End of Shift Note    Bedside shift change report given to John Mendoza  (oncoming nurse) by Estella Crespo RN (offgoing nurse). Report included the following information SBAR    Shift worked:  Day      Shift summary and any significant changes: Up with assistance.  in room visiting today. 2 of 3 sputums sent for AFB. Getting heparin and rate adjusted. Concerns for physician to address:  Above      Zone phone for oncoming shift:   Na        Activity:  Activity Level: Up with Assistance  Number times ambulated in hallways past shift: 0  Number of times OOB to chair past shift: 2    Cardiac:   Cardiac Monitoring: Yes      Cardiac Rhythm: Sinus Rhythm    Access:  Current line(s): PIV     Genitourinary:   Urinary status: voiding    Respiratory:   O2 Device: Nasal cannula  Chronic home O2 use?: YES  Incentive spirometer at bedside: NO       GI:  Last Bowel Movement Date: 10/17/22  Current diet:  ADULT DIET Regular; 5 carb choices (75 gm/meal)  DIET ONE TIME MESSAGE  Passing flatus: NO  Tolerating current diet: YES       Pain Management:   Patient states pain is manageable on current regimen: N/A    Skin:  Neil Score: 19  Interventions: increase time out of bed    Patient Safety:  Fall Score:  Total Score: 3  Interventions: pt to call before getting OOB       Length of Stay:  Expected LOS: 4d 2h  Actual LOS: 2      Estella Crespo, CYNDIE

## 2022-10-19 NOTE — PROGRESS NOTES
Problem: Unstable angina/NSTEMI: Day 2  Goal: Off Pathway (Use only if patient is Off Pathway)  Outcome: Progressing Towards Goal  Goal: Activity/Safety  Outcome: Progressing Towards Goal  Goal: Consults, if ordered  Outcome: Progressing Towards Goal  Goal: Diagnostic Test/Procedures  Outcome: Progressing Towards Goal  Goal: Nutrition/Diet  Outcome: Progressing Towards Goal  Goal: Discharge Planning  Outcome: Progressing Towards Goal  Goal: Medications  Outcome: Progressing Towards Goal  Goal: Respiratory  Outcome: Progressing Towards Goal  Goal: Treatments/Interventions/Procedures  Outcome: Progressing Towards Goal  Goal: Psychosocial  Outcome: Progressing Towards Goal  Goal: *Hemodynamically stable  Outcome: Progressing Towards Goal  Goal: *Optimal pain control at patient's stated goal  Outcome: Progressing Towards Goal  Goal: *Lungs clear or at baseline  Outcome: Progressing Towards Goal

## 2022-10-19 NOTE — PROGRESS NOTES
Transition of Care Plan:    RUR: 7%  Disposition:   Follow up appointments: PCP, Specialist  DME needed: none  Transportation at Discharge: Family to provide transportation  [address] or means to access home: Family has access  IM Medicare Letter: 2nd IM Letter at d/c  Is patient a [place] and connected with the [place]? N/A             If yes, was [place] transfer form completed and VA notified? N/A  Caregiver Contact: Vivi Simmons Core- [phone]  Discharge Caregiver contacted prior to discharge? If pt desires  Care Conference needed?  n/a     CM introduced self and role to pt via phone, pt on contact precautions, verified pt demographics, insurance info and emergency contact. Pt lives with  in one story home with 4 steps to enter. Pt independent with ADL's, ambulating and drives. No HH, ADL's and SNF in the past. Uses [facility] on [address] Reason for Admission:  NSTEMI                     RUR Score:   7%           Plan for utilizing home health:   none       PCP: First and Last name:  Gladis Mujica MD     Name of Practice:    Are you a current patient: Yes/No: Yes   Approximate date of last visit:  10/13/22   Can you participate in a virtual visit with your PCP:                     Current Advanced Directive/Advance Care Plan: Full Code    Pt has ACP not on file  Healthcare Decision Maker:   Click here to complete [address] including selection of the Healthcare Decision Maker Relationship (ie \"Primary\")           Vivi Simmons Core- [phone]                  Transition of Care Plan:         Home    Care Management Interventions  Mode of Transport at Discharge:  Other (see comment)  Transition of Care Consult (CM Consult): Discharge Planning  Physical Therapy Consult: No  Occupational Therapy Consult: No  Support Systems: Child(laure), Spouse/Significant Other  Discharge Location  Patient Expects to be Discharged to[de-identified] [facility] [address]  Phone: [phone]

## 2022-10-19 NOTE — CARDIO/PULMONARY
Cardiopulmonary Rehab:    Chart reviewed. Pt is a 69 y.o. F admitted with NSTEMI (non-ST elevated myocardial infarction) ([facility] [address] [address] [I21.4]. Per MD's note patient will need cardiac cath but currently a rule out for TB. Echo currently pending. Patient is a former smoker, quit in [address] [place] [address]. Will follow post cath.

## 2022-10-19 NOTE — PROGRESS NOTES
Infectious Disease Progress      Impression    Acute pneumonia  Leukocytosis WBC 18.1  Concern for atypical mycobacteria based on CTA  findings of micro nodularity in lingula    CTA chest + Centrilobular emphysema. Possible interstitial prominence which could represent vascular congestion. Micronodularity in the lingula which may represent an atypical   infectious process such as mycobacterium. -Appreciate pulmonary input-ILD versus pulmonary edema. NSTEMI  Elevated troponin  Cardiology following-Per cardiology  Will need cardiac cath,  Plan for cardiac cath once TB status is confirmed. Diabetes M mellitus  A1c pending    Acute hypoxic respiratory failure   on 2 L O2     Emphysema   On Duonebs     Morbid obesity  BMI 40.89. Plan  -Continue cefepime and azithromycin  -Sputum AFB, QuantiFERON pending  -Respiratory culture. Atypical mycobacteria does not require airborne isolation, but since sputum AFB  has been ordered,  it would require isolation per protocol. This should not be a deterrent for an essential life-saving  procedure such as cardiac catheterization.    -Patient needs to be worked up for other causes of nodular lung lesions such as  -sarcoid , fungal  infections, granulomatous disease. Patient gives history of yard work prior to onset of symptoms. Legionella , fungal infections  would be in differential.        Sarah Mendenhall is 69 y.o. female with PMHx significant for diabetes, hypertension, who presented with a chief complaint of cough and shortness of breath. Patient reported she has had a cough and URI symptoms for the last week which she attributed to allergies. She was at her primary care doctor on Thursday and was told to continue using Flonase and Zyrtec. She became acutely more short of breath today prompting her trip to the emergency department. She denied associated shortness of breath or fever. No  known sick contacts. No abdominal pain, nausea, vomiting.   WBC at time of admission 18.1, creatinine 0.87. CXR-diffuse bilateral interstitial markings representing interstitial pneumonia. CTA chest + FINDINGS:  CHEST:  Chest wall/thoracic inlet: Within normal limits. Thyroid: Thyroid nodule measuring approximately 4.5 x 3.1 cm at the thoracic  inlet which causes mild mass effect upon the trachea. Mediastinum/cameron: Patulous esophagus. Heart/vessels: Calcifications in the coronary arteries. Lungs/Pleura: Motion. Centrilobular emphysema. There is likely slight  interstitial prominence. There appear to be tiny nodules in the lingula. .  INCIDENTALLY IMAGED ABDOMEN:  Incidentally imaged portions of the abdomen appear unremarkable. .  MSK:   Small, fat-containing hernia in the medial, right hemidiaphragm. Kyphosis and  degenerative changes in the spine  . IMPRESSION  Limited exam  1. No visualized pulmonary embolus. 2. Centrilobular emphysema. 3. Possible interstitial prominence which could represent vascular congestion. 4. Query micronodularity in the lingula which may represent an atypical  infectious process such as mycobacterium. 5. Left thyroid nodule measuring up to 4.5 cm. Recommend nonemergent ultrasound  for further evaluation if not already performed at an outside facility. 6. Incidental findings as above. Patient has been admitted to hospitalist service. She is being treated for non-STEMI  ID service has been consulted to see patient. Patient seen today. Sitting up in chair. Cough still+ . D/w pulmonary.      Active Hospital Problems    Diagnosis Date Noted    NSTEMI (non-ST elevated myocardial infarction) ([address] 10/17/2022         Past Medical History:   Diagnosis Date    Diabetes ([address]     Hypertension        Past Surgical History:   Procedure Laterality Date    COLONOSCOPY N/A 9/23/2020    COLONOSCOPY performed by Ashli Stewart MD at [facility] ENDOSCOPY    COLONOSCOPY N/A 9/30/2022    COLONOSCOPY performed by Ginger Elias MD at [facility] ENDOSCOPY COLONOSCOPY,ASHANTI AVINA,LEXI  2020         HX BREAST BIOPSY Left     40 yrs ago--neg    HX CATARACT REMOVAL Bilateral     HX HEENT      upper lid lift    HX ORTHOPAEDIC Bilateral     carpal tunnel release    HX OTHER SURGICAL      colonoscopy       Allergies   Allergen Reactions    Bactrim [Sulfamethoprim] Hives and Itching       Social Connections: Not on file       Family Status   Relation Name Status    Mother      Father      Sister  Alive    Brother  Alive    Brother  Alive    Brother  Alive    Sister  Alive       Medication Documentation Review Audit       Reviewed by Maame Mai RN (Registered Nurse) on 10/17/22 at       Medication Sig Documenting Provider Last Dose Status Taking?   aspirin 81 mg chewable tablet Take 81 mg by mouth daily. Mare Marx MD 10/17/2022 Active Yes   calcium-cholecalciferol, d3, (CALCIUM 600 + D) 600-125 mg-unit tab Take  by mouth daily. Provider, Historical 10/17/2022 Active Yes   DOCOSAHEXANOIC ACID/EPA (FISH OIL PO) Take 1 Cap by mouth two (2) times a day. Patient not taking: Reported on 10/17/2022    Mare Marx MD Not Taking Active No   furosemide (LASIX) 20 mg tablet Take 20 mg by mouth as needed. Provider, Historical 10/10/2022 Active Yes   glimepiride (AMARYL) 1 mg tablet Take 2 mg by mouth every morning. Mare Marx MD 10/17/2022 Active Yes   losartan (COZAAR) 50 mg tablet Take  by mouth daily. Mare Marx MD 10/17/2022 Active Yes   lovastatin (MEVACOR) 20 mg tablet Take 20 mg by mouth nightly. Mare Marx MD 10/16/2022 Active Yes   metFORMIN (GLUCOPHAGE) 1,000 mg tablet Take 1,000 mg by mouth two (2) times daily (with meals). Provider, Historical 10/17/2022 Active Yes                      Review of Systems - Negative except except those mentioned in H&P        PHYSICAL EXAM:  General:          Awake, cooperative, no acute distress    EENT:              EOMI. Anicteric sclerae. MMM  Resp:               CTA bilaterally, no wheezing or rales. No accessory muscle use  CV:                  Regular  rhythm,  No edema  GI:                   Soft, Non distended, Non tender. +Bowel sounds  Neurologic:      Alert and oriented X 3, normal speech,   Psych:             Good insight. Not anxious nor agitated  Skin:                No rashes. No jaundice.   Extremities: No edema    Bam Bryant MD [address]

## 2022-10-19 NOTE — PROGRESS NOTES
10/19/22 0030   Vitals   Temp 98.6 °F (37 °C)   Temp Source Oral   Pulse (Heart Rate) (!) 110   Resp Rate 23   O2 Sat (%) 93 %   Level of Consciousness 0   BP (!) 131/92   MAP (Monitor) 104   MAP (Calculated) 105   Cardiac Rhythm Sinus Tachy   MEWS Score 3   Pain 1   Pain Scale 1 Numeric (0 - 10)   Pain Intensity 1 0   Patient Stated Pain Goal 0   Pain Reassessment 1 Yes   Oxygen Therapy   Pulse via Oximetry 125 beats per minute   O2 Device Nasal cannula   Skin Assessment Clean, dry, & intact   O2 Flow Rate (L/min) 3 l/min   Patient back in bed from bedside commode increased hr & bp  with ambulating

## 2022-10-19 NOTE — ROUTINE PROCESS
Report received from Jayy Gastelum , [facility]. SBAR were discussed.     Alva Cheek RN

## 2022-10-20 ENCOUNTER — APPOINTMENT (OUTPATIENT)
Dept: NON INVASIVE DIAGNOSTICS | Age: 77
DRG: 871 | End: 2022-10-20
Attending: STUDENT IN AN ORGANIZED HEALTH CARE EDUCATION/TRAINING PROGRAM
Payer: MEDICARE

## 2022-10-20 LAB
APTT PPP: 124.4 SEC (ref 22.1–31)
APTT PPP: 36.5 SEC (ref 22.1–31)
APTT PPP: 38.7 SEC (ref 22.1–31)
APTT PPP: 46 SEC (ref 22.1–31)
ECHO AO ASC DIAM: 2.6 CM
ECHO AO ASCENDING AORTA INDEX: 1.27 CM/M2
ECHO AO ROOT DIAM: 2.8 CM
ECHO AO ROOT INDEX: 1.37 CM/M2
ECHO AV AREA PEAK VELOCITY: 1.9 CM2
ECHO AV AREA VTI: 2.2 CM2
ECHO AV AREA/BSA PEAK VELOCITY: 0.9 CM2/M2
ECHO AV AREA/BSA VTI: 1.1 CM2/M2
ECHO AV MEAN GRADIENT: 5 MMHG
ECHO AV MEAN VELOCITY: 1.1 M/S
ECHO AV PEAK GRADIENT: 8 MMHG
ECHO AV PEAK VELOCITY: 1.4 M/S
ECHO AV VELOCITY RATIO: 0.71
ECHO AV VTI: 29.7 CM
ECHO LA DIAMETER INDEX: 1.56 CM/M2
ECHO LA DIAMETER: 3.2 CM
ECHO LA TO AORTIC ROOT RATIO: 1.14
ECHO LA VOL 4C: 28 ML (ref 22–52)
ECHO LA VOLUME INDEX A4C: 14 ML/M2 (ref 16–34)
ECHO LV EDV A4C: 138 ML
ECHO LV EDV INDEX A4C: 67 ML/M2
ECHO LV EJECTION FRACTION A4C: 29 %
ECHO LV ESV A4C: 99 ML
ECHO LV ESV INDEX A4C: 48 ML/M2
ECHO LV FRACTIONAL SHORTENING: 11 % (ref 28–44)
ECHO LV INTERNAL DIMENSION DIASTOLE INDEX: 1.76 CM/M2
ECHO LV INTERNAL DIMENSION DIASTOLIC: 3.6 CM (ref 3.9–5.3)
ECHO LV INTERNAL DIMENSION SYSTOLIC INDEX: 1.56 CM/M2
ECHO LV INTERNAL DIMENSION SYSTOLIC: 3.2 CM
ECHO LV IVSD: 1.1 CM (ref 0.6–0.9)
ECHO LV MASS 2D: 124.1 G (ref 67–162)
ECHO LV MASS INDEX 2D: 60.5 G/M2 (ref 43–95)
ECHO LV POSTERIOR WALL DIASTOLIC: 1.1 CM (ref 0.6–0.9)
ECHO LV RELATIVE WALL THICKNESS RATIO: 0.61
ECHO LVOT AREA: 2.8 CM2
ECHO LVOT AV VTI INDEX: 0.77
ECHO LVOT DIAM: 1.9 CM
ECHO LVOT MEAN GRADIENT: 2 MMHG
ECHO LVOT PEAK GRADIENT: 4 MMHG
ECHO LVOT PEAK VELOCITY: 1 M/S
ECHO LVOT STROKE VOLUME INDEX: 31.8 ML/M2
ECHO LVOT SV: 65.2 ML
ECHO LVOT VTI: 23 CM
ECHO MV A VELOCITY: 0.9 M/S
ECHO MV AREA VTI: 2.4 CM2
ECHO MV E DECELERATION TIME (DT): 152.8 MS
ECHO MV E VELOCITY: 0.87 M/S
ECHO MV E/A RATIO: 0.97
ECHO MV LVOT VTI INDEX: 1.2
ECHO MV MAX VELOCITY: 1 M/S
ECHO MV MEAN GRADIENT: 2 MMHG
ECHO MV MEAN VELOCITY: 0.6 M/S
ECHO MV PEAK GRADIENT: 4 MMHG
ECHO MV VTI: 27.5 CM
ECHO RV INTERNAL DIMENSION: 2.8 CM
FLUID CULTURE, SPNG2: NORMAL
GLUCOSE BLD STRIP.AUTO-MCNC: 133 MG/DL (ref 65–117)
GLUCOSE BLD STRIP.AUTO-MCNC: 181 MG/DL (ref 65–117)
GLUCOSE BLD STRIP.AUTO-MCNC: 197 MG/DL (ref 65–117)
GLUCOSE BLD STRIP.AUTO-MCNC: 201 MG/DL (ref 65–117)
HISTOPLASMA AG, UR,HAGU: <0.5 NG/ML
L PNEUMO1 AG UR QL IA: NEGATIVE
ORGANISM ID, SPNG3: NORMAL
PLEASE NOTE, SPNG4: NORMAL
S PNEUM AG SPEC QL LA: NEGATIVE
SERVICE CMNT-IMP: ABNORMAL
SPECIMEN SOURCE: NORMAL
SPECIMEN SOURCE: NORMAL
SPECIMEN, SPNG1: NORMAL
THERAPEUTIC RANGE,PTTT: ABNORMAL SECS (ref 58–77)

## 2022-10-20 PROCEDURE — 87116 MYCOBACTERIA CULTURE: CPT

## 2022-10-20 PROCEDURE — 94640 AIRWAY INHALATION TREATMENT: CPT

## 2022-10-20 PROCEDURE — 74011250637 HC RX REV CODE- 250/637: Performed by: STUDENT IN AN ORGANIZED HEALTH CARE EDUCATION/TRAINING PROGRAM

## 2022-10-20 PROCEDURE — 77010033678 HC OXYGEN DAILY

## 2022-10-20 PROCEDURE — 74011000250 HC RX REV CODE- 250: Performed by: INTERNAL MEDICINE

## 2022-10-20 PROCEDURE — 74011250636 HC RX REV CODE- 250/636: Performed by: EMERGENCY MEDICINE

## 2022-10-20 PROCEDURE — 74011250637 HC RX REV CODE- 250/637: Performed by: NURSE PRACTITIONER

## 2022-10-20 PROCEDURE — 85730 THROMBOPLASTIN TIME PARTIAL: CPT

## 2022-10-20 PROCEDURE — 82962 GLUCOSE BLOOD TEST: CPT

## 2022-10-20 PROCEDURE — 74011636637 HC RX REV CODE- 636/637: Performed by: STUDENT IN AN ORGANIZED HEALTH CARE EDUCATION/TRAINING PROGRAM

## 2022-10-20 PROCEDURE — 36415 COLL VENOUS BLD VENIPUNCTURE: CPT

## 2022-10-20 PROCEDURE — 74011000258 HC RX REV CODE- 258: Performed by: INTERNAL MEDICINE

## 2022-10-20 PROCEDURE — 93306 TTE W/DOPPLER COMPLETE: CPT

## 2022-10-20 PROCEDURE — 74011250636 HC RX REV CODE- 250/636: Performed by: INTERNAL MEDICINE

## 2022-10-20 PROCEDURE — 65270000046 HC RM TELEMETRY

## 2022-10-20 RX ORDER — IPRATROPIUM BROMIDE AND ALBUTEROL SULFATE 2.5; .5 MG/3ML; MG/3ML
3 SOLUTION RESPIRATORY (INHALATION)
Status: DISCONTINUED | OUTPATIENT
Start: 2022-10-20 | End: 2022-10-24

## 2022-10-20 RX ADMIN — CARVEDILOL 3.12 MG: 3.12 TABLET, FILM COATED ORAL at 17:28

## 2022-10-20 RX ADMIN — HEPARIN SODIUM 2000 UNITS: 1000 INJECTION INTRAVENOUS; SUBCUTANEOUS at 17:31

## 2022-10-20 RX ADMIN — ATORVASTATIN CALCIUM 10 MG: 10 TABLET, FILM COATED ORAL at 22:02

## 2022-10-20 RX ADMIN — Medication 3 UNITS: at 14:09

## 2022-10-20 RX ADMIN — IPRATROPIUM BROMIDE AND ALBUTEROL SULFATE 3 ML: .5; 3 SOLUTION RESPIRATORY (INHALATION) at 07:38

## 2022-10-20 RX ADMIN — CEFEPIME 2 G: 2 INJECTION, POWDER, FOR SOLUTION INTRAVENOUS at 22:01

## 2022-10-20 RX ADMIN — AZITHROMYCIN MONOHYDRATE 500 MG: 500 INJECTION, POWDER, LYOPHILIZED, FOR SOLUTION INTRAVENOUS at 20:39

## 2022-10-20 RX ADMIN — CEFEPIME 2 G: 2 INJECTION, POWDER, FOR SOLUTION INTRAVENOUS at 04:52

## 2022-10-20 RX ADMIN — CEFEPIME 2 G: 2 INJECTION, POWDER, FOR SOLUTION INTRAVENOUS at 14:09

## 2022-10-20 RX ADMIN — ASPIRIN 81 MG CHEWABLE TABLET 81 MG: 81 TABLET CHEWABLE at 08:31

## 2022-10-20 RX ADMIN — Medication 2 UNITS: at 10:37

## 2022-10-20 RX ADMIN — IPRATROPIUM BROMIDE AND ALBUTEROL SULFATE 3 ML: .5; 3 SOLUTION RESPIRATORY (INHALATION) at 14:13

## 2022-10-20 RX ADMIN — SACUBITRIL AND VALSARTAN 1 TABLET: 24; 26 TABLET, FILM COATED ORAL at 22:04

## 2022-10-20 RX ADMIN — LOSARTAN POTASSIUM 50 MG: 25 TABLET, FILM COATED ORAL at 08:31

## 2022-10-20 RX ADMIN — CARVEDILOL 3.12 MG: 3.12 TABLET, FILM COATED ORAL at 08:31

## 2022-10-20 RX ADMIN — IPRATROPIUM BROMIDE AND ALBUTEROL SULFATE 3 ML: .5; 3 SOLUTION RESPIRATORY (INHALATION) at 23:00

## 2022-10-20 RX ADMIN — HEPARIN SODIUM 21.5 UNITS/KG/HR: 10000 INJECTION, SOLUTION INTRAVENOUS at 20:36

## 2022-10-20 NOTE — CONSULTS
Pulmonary, Critical Care, and Sleep Medicine    Patient Consult    Name: Maggi Powers MRN: [id number]   : 1945 Hospital: [facility]   Date: 10/20/2022                IMPRESSION:   Acute Hypoxic Respiratory Failure, pox was 97% on 3L. COPD/ Emphysematous with an acute decompensation, Has cough and wheezing. changes on CT scan imaging. May be ILD versus Pulmonary Edema  Micronodules of the lingula, may be atypical AFB, low procalcitonin. This does not appear to be the etiology of her acute symptoms. Pt can be ruled out for M TB. NO need for bronch at this point. Decreased LVEF  Leukocytosis. Anemia: hgb of 10.5. Left thyroid nodule measures 4.5cm. No Pulmonary Embolus  Increased troponin. Type 2 DM  Obesity. Ex smoker, started smoking at age of 22. Quit about 20 year ago. RECOMMENDATIONS:   ON Duonebs  Adjust oxygen to keep pox over 90%. ON Cefepime and Azithromycin  No need for bronch at this point. ON heparin drip for acute MI. Has low ef. Further eval needed before any bronch to be done. Home oxygen evaluation. If AFB negative agree with discharge home. PCP: Simone      Subjective:     10/20/2022  Patient is feeling pretty well. She is sitting up in a chair when seen. She has a nonproductive cough. She denies any sinus pain or pressure. Denies any chest pain or pressure. She reports her breathing feels pretty good. She has not been on home oxygen prior to this admission will need further evaluation to evaluate if she needs home oxygen. NO leg pain. NO issues sleeping. This patient has been seen and evaluated at the request of Dr. Hassan Boeck for above. Patient is a 69 y.o. female who presents for above. Has been coughing, congested, noted to have worsening dyspnea. Has concerned for allergies. Cough has been worse for 3 weeks. Has been having some chest pain. NO fevers, no chills, no sweats. NO issues with aspiration.    Has a hx of DM, followed by  Dr. Vandana Nagel. Pt has been with coughing and congestion. Past Medical History:   Diagnosis Date    Diabetes [facility])     Hypertension       Past Surgical History:   Procedure Laterality Date    COLONOSCOPY N/A 2020    COLONOSCOPY performed by Jose Ramon Sinclair MD at [facility] ENDOSCOPY    COLONOSCOPY N/A 2022    COLONOSCOPY performed by Gwen Segura MD at [facility] ENDOSCOPY    COLONOSCOPY,ASHANTI AVINA,SNARE  2020         HX BREAST BIOPSY Left     40 yrs ago--neg    HX CATARACT REMOVAL Bilateral     HX HEENT      upper lid lift    HX ORTHOPAEDIC Bilateral     carpal tunnel release    HX OTHER SURGICAL      colonoscopy      Prior to Admission medications    Medication Sig Start Date End Date Taking? Authorizing Provider   calcium-cholecalciferol, d3, (CALCIUM 600 + D) 600-125 mg-unit tab Take  by mouth daily. Yes Provider, Historical   metFORMIN (GLUCOPHAGE) 1,000 mg tablet Take 1,000 mg by mouth two (2) times daily (with meals). Yes Provider, Historical   furosemide (LASIX) 20 mg tablet Take 20 mg by mouth as needed. Yes Provider, Historical   losartan (COZAAR) 50 mg tablet Take  by mouth daily. Yes Other, MD Mare   lovastatin (MEVACOR) 20 mg tablet Take 20 mg by mouth nightly. Yes Francisco J, MD Mare   glimepiride (AMARYL) 1 mg tablet Take 2 mg by mouth every morning. Yes Francisco J, MD Mare   aspirin 81 mg chewable tablet Take 81 mg by mouth daily. Yes Francisco J, MD Mare   DOCOSAHEXANOIC ACID/EPA (FISH OIL PO) Take 1 Cap by mouth two (2) times a day.   Patient not taking: Reported on 10/17/2022    Other, MD Mare     Allergies   Allergen Reactions    Bactrim [Sulfamethoprim] Hives and Itching      Social History     Tobacco Use    Smoking status: Former     Packs/day: 1.00     Years: 25.00     Pack years: 25.00     Types: Cigarettes     Quit date: 1999     Years since quittin.0    Smokeless tobacco: Never   Substance Use Topics    Alcohol use: Never     Comment: none in 30 years      Family History   Problem Relation Age of Onset    Diabetes Mother     Hypertension Mother     Hypertension Father     Other Father         cerebral aneurysm    Diabetes Brother     Hypertension Brother     Lung Disease Brother     Diabetes Sister         Current Facility-Administered Medications   Medication Dose Route Frequency    albuterol-ipratropium (DUO-NEB) 2.5 MG-0.5 MG/3 ML  3 mL Nebulization TID RT    [START ON 10/21/2022] empagliflozin (JARDIANCE) tablet 10 mg  10 mg Oral DAILY    sacubitriL-valsartan (ENTRESTO) 24-26 mg tablet 1 Tablet  1 Tablet Oral Q12H    atorvastatin (LIPITOR) tablet 10 mg  10 mg Oral QHS    insulin lispro (HUMALOG) injection 1-8 Units  1-8 Units SubCUTAneous AC&HS    azithromycin (ZITHROMAX) 500 mg in 0.9% sodium chloride 250 mL ([id number])  500 mg IntraVENous Q24H    cefepime (MAXIPIME) 2 g in 0.9% sodium chloride (MBP/ADV) 100 mL MBP  2 g IntraVENous Q8H    heparin 25,000 units in D5W 250 ml infusion  9.5-25 Units/kg/hr IntraVENous TITRATE    aspirin chewable tablet 81 mg  81 mg Oral DAILY    carvediloL (COREG) tablet 3.125 mg  3.125 mg Oral BID WITH MEALS       Review of Systems:  Constitutional: positive for fatigue and malaise  Eyes: negative  Ears, nose, mouth, throat, and face: positive for nasal congestion  Respiratory: positive for cough, sputum, wheezing, dyspnea on exertion, or chronic bronchitis  Cardiovascular: positive for chest pain, chest pressure/discomfort, fatigue, paroxysmal nocturnal dyspnea, lower extremity edema, tachypnea, dyspnea on exertion  Gastrointestinal: positive for dyspepsia  Genitourinary:negative  Integument/breast: negative  Hematologic/lymphatic: negative  Musculoskeletal:negative  Neurological: negative  Behavioral/Psych: negative  Endocrine: negative  Allergic/Immunologic: negative    Objective:   Vital Signs:    Visit Vitals  BP (!) 134/93   Pulse 88   Temp 98.4 °F (36.9 °C)   Resp 24   Ht 5' 3\" (1.6 m)   Wt 104.3 kg (230 lb)   SpO2 97%   BMI 40.74 kg/m²       O2 Device: Nasal cannula   O2 Flow Rate (L/min): 2 l/min   Temp (24hrs), Av.4 °F (36.9 °C), Min:97.7 °F (36.5 °C), Max:98.7 °F (37.1 °C)       Intake/Output:   Last shift:      No intake/output data recorded. Last 3 shifts: 10/18 1901 - 10/20 0700  In: 182 [I.V.:182]  Out: 800 [Urine:800]    Intake/Output Summary (Last 24 hours) at 10/20/2022 1559  Last data filed at 10/20/2022 0429  Gross per 24 hour   Intake 182 ml   Output 400 ml   Net -218 ml        Physical Exam:   General:  Alert, cooperative, no distress, appears stated age. Sitting up in chair. Head:  Normocephalic, without obvious abnormality, atraumatic. Eyes:  Conjunctivae/corneas clear. PERRL, EOMs intact. Nose: Nares normal. Septum midline. Mucosa normal. No drainage or sinus tenderness. Throat: Lips, mucosa, and tongue normal. Teeth and gums normal.   Neck: Supple, symmetrical, trachea midline, no adenopathy, thyroid: no enlargment/tenderness/nodules, no carotid bruit and no JVD. Back:   Symmetric, no curvature. ROM normal.   Lungs:   Clear to auscultation bilaterally. Has some some coughing or congestion. Chest wall:  No tenderness or deformity. Heart:  Regular rate and rhythm, S1, S2 normal, no murmur, click, rub or gallop. Abdomen:   Soft, non-tender. Bowel sounds normal. No masses,  No organomegaly. Obese. Extremities: Extremities normal, atraumatic, no cyanosis or edema. Pulses: 2+ and symmetric all extremities. Skin: Skin color, texture, turgor normal. No rashes or lesions   Lymph nodes: Cervical, supraclavicular, and axillary nodes normal.   Neurologic: Grossly nonfocal, motor is intact.       Data review:     Recent Results (from the past 24 hour(s))   GLUCOSE, POC    Collection Time: 10/19/22  4:40 PM   Result Value Ref Range    Glucose (POC) 175 (H) 65 - 117 mg/dL    Performed by Everardo SANCHES    PTT    Collection Time: 10/19/22  6:49 PM   Result Value Ref Range    aPTT 28.8 22.1 - 31.0 sec    aPTT, therapeutic range     58.0 - 77.0 SECS   GLUCOSE, POC    Collection Time: 10/19/22  8:56 PM   Result Value Ref Range    Glucose (POC) 219 (H) 65 - 117 mg/dL    Performed by Moy Moffett (PCT)    PTT    Collection Time: 10/20/22  3:07 AM   Result Value Ref Range    aPTT 124.4 (HH) 22.1 - 31.0 sec    aPTT, therapeutic range     58.0 - 77.0 SECS   PTT    Collection Time: 10/20/22  6:37 AM   Result Value Ref Range    aPTT 36.5 (H) 22.1 - 31.0 sec    aPTT, therapeutic range     58.0 - 77.0 SECS   ECHO ADULT COMPLETE    Collection Time: 10/20/22  7:15 AM   Result Value Ref Range    IVSd 1.1 (A) 0.6 - 0.9 cm    LVIDd 3.6 (A) 3.9 - 5.3 cm    LVIDs 3.2 cm    LVOT Diameter 1.9 cm    LVPWd 1.1 (A) 0.6 - 0.9 cm    LV Ejection Fraction A4C 29 %    LV EDV A4C 138 mL    LV ESV A4C 99 mL    LVOT Peak Gradient 4 mmHg    LVOT Mean Gradient 2 mmHg    LVOT SV 65.2 ml    LVOT Peak Velocity 1.0 m/s    LVOT VTI 23.0 cm    RVIDd 2.8 cm    LA Diameter 3.2 cm    LA Volume 4C 28 22 - 52 mL    AV Area by Peak Velocity 1.9 cm2    AV Area by VTI 2.2 cm2    AV Peak Gradient 8 mmHg    AV Mean Gradient 5 mmHg    AV Peak Velocity 1.4 m/s    AV Mean Velocity 1.1 m/s    AV VTI 29.7 cm    MV A Velocity 0.90 m/s    MV E Wave Deceleration Time 152.8 ms    MV E Velocity 0.87 m/s    MV Area by VTI 2.4 cm2    MV Peak Gradient 4 mmHg    MV Mean Gradient 2 mmHg    MV Max Velocity 1.0 m/s    MV Mean Velocity 0.6 m/s    MV VTI 27.5 cm    Ascending Aorta 2.6 cm    Aortic Root 2.8 cm    Fractional Shortening 2D 11 28 - 44 %    LV ESV Index A4C 48 mL/m2    LV EDV Index A4C 67 mL/m2    LVIDd Index 1.76 cm/m2    LVIDs Index 1.56 cm/m2    LV RWT Ratio 0.61     LV Mass 2D 124.1 67 - 162 g    LV Mass 2D Index 60.5 43 - 95 g/m2    MV E/A 0.97     LVOT Stroke Volume Index 31.8 mL/m2    LVOT Area 2.8 cm2    LA Volume Index 4C 14 (A) 16 - 34 mL/m2    LA Size Index 1.56 cm/m2    LA/AO Root Ratio 1.14     Ao Root Index 1.37 cm/m2    Ascending Aorta Index 1.27 cm/m2    AV Velocity Ratio 0.71     LVOT:AV VTI Index 0.77     JASON/BSA VTI 1.1 cm2/m2    JASON/BSA Peak Velocity 0.9 cm2/m2    MV:LVOT VTI Index 1.20    GLUCOSE, POC    Collection Time: 10/20/22  8:14 AM   Result Value Ref Range    Glucose (POC) 197 (H) 65 - 117 mg/dL    Performed by Trisha Garcia RN    GLUCOSE, POC    Collection Time: 10/20/22 11:03 AM   Result Value Ref Range    Glucose (POC) 201 (H) 65 - 117 mg/dL    Performed by Guillermo Fernando (Float)    PTT    Collection Time: 10/20/22  2:13 PM   Result Value Ref Range    aPTT 38.7 (H) 22.1 - 31.0 sec    aPTT, therapeutic range     58.0 - 77.0 SECS       Imaging:  I have personally reviewed the patients radiographs and have reviewed the reports:  10-17-22: CT of chest:   IMPRESSION    1. No visualized pulmonary embolus. 2. Centrilobular emphysema. 3. Possible interstitial prominence which could represent vascular congestion. 4. Query micronodularity in the lingula which may represent an atypical  infectious process such as mycobacterium. 5. Left thyroid nodule measuring up to 4.5 cm. Recommend nonemergent ultrasound  for further evaluation if not already performed at an outside facility. 6. Incidental findings as above.         Luther Gonsalez MD

## 2022-10-20 NOTE — PROGRESS NOTES
Hospitalist Progress Note    NAME: Bettie Croft   :  1945   MRN:  [id number]       Assessment / Plan:    NSTEMI  - Heparin gtt  - ASA stat  - Cardio consult  - supplemental O2 to keep sats >90%  - currently on 3L O2  - Morphine PNR  - Nitrtes  - lipitor, cozaar, ASA, coreg  -await [facility] once off airborne isolation    Sepsis leukocytosis 15.9 and tachycardia 130  ? Acute PNA, possibly 2/2 Mycobacterium  - airborne Isolation  - Quantiferon gold and AFB pending  -on cefepime and azithro  - no indication for bronch at this time  - ID consult  - Atypical mycobacteria does not require airborne isolation,    AFB has been ordered, so it would require isolation per protocol. DM  - hold home Metformin and glipizide  - RISS - normal     Centrilobular Emphysema  - Duoneb; needs outpt PFT    40 or above Morbid obesity / Body mass index is 40.74 kg/m². Estimated discharge date:   Barriers: await TB test results and cath    Code status: Full  Prophylaxis:  heparin gtt  Recommended Disposition: Home w/Family     Subjective:     Chief Complaint / Reason for Physician Visit  \"sob\". Discussed with RN events overnight. Sob improving, denies chest pain, cough, nausea or vomiting    Review of Systems:  Symptom Y/N Comments  Symptom Y/N Comments   Fever/Chills n   Chest Pain n    Poor Appetite    Edema     Cough n   Abdominal Pain n    Sputum    Joint Pain     SOB/VEGAS n   Pruritis/Rash     Nausea/vomit    Tolerating PT/OT     Diarrhea    Tolerating Diet     Constipation    Other       Could NOT obtain due to:      Objective:     VITALS:   Last 24hrs VS reviewed since prior progress note.  Most recent are:  Patient Vitals for the past 24 hrs:   Temp Pulse Resp BP SpO2   10/20/22 1414 -- -- -- -- 97 %   10/20/22 1055 98.4 °F (36.9 °C) 88 24 (!) 134/93 97 %   10/20/22 0749 -- -- -- (!) 140/71 --   10/20/22 0745 98.7 °F (37.1 °C) 82 22 (!) 140/71 97 %   10/20/22 0738 -- -- -- -- 96 %   10/20/22 0259 98.5 °F (36.9 °C) 78 24 124/73 97 %   10/19/22 2305 98.6 °F (37 °C) 79 20 108/70 97 %   10/19/22 1906 97.7 °F (36.5 °C) 82 22 (!) 146/79 97 %   10/19/22 1802 -- 82 -- (!) 146/79 --         Intake/Output Summary (Last 24 hours) at 10/20/2022 1453  Last data filed at 10/20/2022 0429  Gross per 24 hour   Intake 182 ml   Output 400 ml   Net -218 ml          I had a face to face encounter and independently examined this patient on 10/20/2022, as outlined below:  PHYSICAL EXAM:  General:  Alert, obese no acute distress    Resp:  Bibasilar rales,  No accessory muscle use  CV:  Regular  rhythm,  No edema  GI:  Soft, Non distended, Non tender. +Bowel sounds  Neurologic:  Alert and oriented X 3, normal speech,   Psych:   Not anxious nor agitated  Skin:  No rashes. No jaundice    Reviewed most current lab test results and cultures  YES  Reviewed most current radiology test results   YES  Review and summation of old records today    NO  Reviewed patient's current orders and MAR    YES  PMH/ reviewed - no change compared to H&P  ________________________________________________________________________  Care Plan discussed with:    Comments   Patient x    Family      RN x    Care Manager     Consultant                       x Multidiciplinary team rounds were held today with , nursing, pharmacist and clinical coordinator. Patient's plan of care was discussed; medications were reviewed and discharge planning was addressed.      ________________________________________________________________________  Total NON critical care TIME:  40   Minutes    Total CRITICAL CARE TIME Spent:   Minutes non procedure based      Comments   >50% of visit spent in counseling and coordination of care x    ________________________________________________________________________  Dolores Newman MD     Procedures: see electronic medical records for all procedures/Xrays and details which were not copied into this note but were reviewed prior to creation of Plan. LABS:  I reviewed today's most current labs and imaging studies.   Pertinent labs include:  Recent Labs     10/18/22  0651 10/17/22  1708   WBC 13.1* 15.9*   HGB 10.5* 10.8*   HCT 34.8* 35.4    308       Recent Labs     10/19/22  0433 10/18/22  0651 10/17/22  2343 10/17/22  1601   * 137  --   --    K 3.9 4.4  --   --     102  --   --    CO2 23 24  --   --    * 216*  --   --    BUN 18 19  --   --    CREA 0.87 0.81  --   --    CA 9.4 10.5*  --   --    MG  --  2.0 1.7  --    PHOS  --  3.1 3.7  --    ALB  --  3.7  --   --    TBILI  --  0.7  --   --    ALT  --  40  --   --    INR  --   --   --  1.0         Signed: Lindsey Valerio MD

## 2022-10-20 NOTE — PROGRESS NOTES
[facility]  [address]  [phone]  [email or web address]         Cardiology Progress Note      10/20/2022 8:40 AM    Admit Date: 10/17/2022    Admit Diagnosis:   NSTEMI (non-ST elevated myocardial infarction) ([address] [I21.4]    Interval History/Subjective:     Sarah Diaz has been doing well. No acute events overnight. Feels great. Wants to go home.     Visit Vitals  BP (!) 140/71   Pulse 82   Temp 98.7 °F (37.1 °C)   Resp 22   Ht 5' 3\" (1.6 m)   Wt 104.3 kg (230 lb)   SpO2 97%   BMI 40.74 kg/m²       Current Facility-Administered Medications   Medication Dose Route Frequency    furosemide (LASIX) tablet 20 mg  20 mg Oral PRN    atorvastatin (LIPITOR) tablet 10 mg  10 mg Oral QHS    losartan (COZAAR) tablet 50 mg  50 mg Oral DAILY    glucagon (GLUCAGEN) injection 1 mg  1 mg IntraMUSCular PRN    dextrose 10 % infusion 250 mL  250 mL IntraVENous DIALYSIS PRN    glucose chewable tablet 16 g  16 g Oral PRN    insulin lispro (HUMALOG) injection 1-8 Units  1-8 Units SubCUTAneous AC&HS    azithromycin (ZITHROMAX) 500 mg in 0.9% sodium chloride 250 mL ([id number])  500 mg IntraVENous Q24H    cefepime (MAXIPIME) 2 g in 0.9% sodium chloride (MBP/ADV) 100 mL MBP  2 g IntraVENous Q8H    albuterol-ipratropium (DUO-NEB) 2.5 MG-0.5 MG/3 ML  3 mL Nebulization Q6H RT    heparin 25,000 units in D5W 250 ml infusion  9.5-25 Units/kg/hr IntraVENous TITRATE    heparin (porcine) 1,000 unit/mL injection 2,000 Units  2,000 Units IntraVENous PRN    Or    heparin (porcine) 1,000 unit/mL injection 4,000 Units  4,000 Units IntraVENous PRN    morphine injection 1 mg  1 mg IntraVENous Q4H PRN    aspirin chewable tablet 81 mg  81 mg Oral DAILY    nitroglycerin (NITROSTAT) tablet 0.4 mg  0.4 mg SubLINGual PRN    carvediloL (COREG) tablet 3.125 mg  3.125 mg Oral BID WITH MEALS       Objective:      Physical Exam:  General: Well developed, in no acute distress, cooperative and alert  HEENT: No carotid bruits, PEERL, EOM intact. Heart:  reg rate and rhythm; normal S1/S2; no murmurs  Respiratory: Clear bilaterally x 4, no wheezing or rales  Abdomen:   Soft, non-tender, no distention, no masses. + BS. Extremities:  Normal cap refill, no cyanosis, atraumatic. No edema. Neuro: A&Ox3, speech clear  Skin: Skin color is normal. Non diaphoretic  Vascular: 2+ pulses symmetric in all extremities    Data Review:   Recent Labs     10/18/22  0651 10/17/22  1708 10/17/22  1427   WBC 13.1* 15.9* 18.1*   HGB 10.5* 10.8* 10.9*   HCT 34.8* 35.4 37.1    308 359     Recent Labs     10/19/22  0433 10/18/22  0651 10/17/22  2343 10/17/22  1601 10/17/22  1427   * 137  --   --  136   K 3.9 4.4  --   --  4.1    102  --   --  102   CO2 23 24  --   --  24   * 216*  --   --  161*   BUN 18 19  --   --  18   CREA 0.87 0.81  --   --  0.87   CA 9.4 10.5*  --   --  10.9*   MG  --  2.0 1.7  --   --    PHOS  --  3.1 3.7  --   --    ALB  --  3.7  --   --  4.0   TBILI  --  0.7  --   --  0.4   ALT  --  40  --   --  50   INR  --   --   --  1.0  --        No results for input(s): TROIQ, CPK, CKMB in the last 72 hours. Intake/Output Summary (Last 24 hours) at 10/20/2022 0851  Last data filed at 10/20/2022 0429  Gross per 24 hour   Intake 182 ml   Output 700 ml   Net -518 ml        Telemetry: sinus rhythm    Echocardiogram: pending    Radiology Results in the last 24 hours:  No results found.         Assessment:     Active Problems:    NSTEMI (non-ST elevated myocardial infarction) ([facility] [address] (10/17/2022)      Plan:     NSTEMI  Trops peaked 2018, now downtrending  No CP symptoms  Plan for cath once she is off airborne isolation  Echo pending  Continue heparin gtt, BB, statin, ASA    Tawny Silveira, NP

## 2022-10-20 NOTE — PROGRESS NOTES
[facility]  [address], [address] [facility]  [phone]  [email or web address]      ECHO:    Left Ventricle: Reduced left ventricular systolic function with a visually estimated EF of 25 - 30%. Left ventricle size is normal. Increased wall thickness. Global hypokinesis present. Normal diastolic function. Right Ventricle: Reduced systolic function    Biventricular CHF. Will change losartan to entresto, add SGLT2I, continue PRN LOOP diuretic. Plan for LHC once TB ruled out. Will need Lifevest upon discharge.      Bailey De León DNP, ANP-BC

## 2022-10-20 NOTE — PROGRESS NOTES
0700   End of Shift Note    Bedside shift change report given to  (oncoming nurse) by Austin Kidd RN (offgoing nurse). Report included the following information SBAR, Kardex, ED Summary, Procedure Summary, Intake/Output, MAR, Accordion, Recent Results, Med Rec Status, and Cardiac Rhythm NSR    Shift worked:  Night     Shift summary and any significant changes:     Heparin gtt Held 04:45 for .4, next PTT is due at 06:45. Heparin to resume when a PTT comes back less than 86. Concerns for physician to address:       Zone phone for oncoming shift:          Activity:  Activity Level: Up with Assistance  Number times ambulated in hallways past shift: 0  Number of times OOB to chair past shift: 0    Cardiac:   Cardiac Monitoring: Yes      Cardiac Rhythm: Sinus Rhythm    Access:  Current line(s): PIV     Genitourinary:   Urinary status: voiding and external catheter    Respiratory:   O2 Device: Nasal cannula  Chronic home O2 use?: NO  Incentive spirometer at bedside: NO       GI:  Last Bowel Movement Date: 10/17/22  Current diet:  ADULT DIET Regular; 5 carb choices (75 gm/meal)  DIET ONE TIME MESSAGE  Passing flatus: YES  Tolerating current diet: YES       Pain Management:   Patient states pain is manageable on current regimen: YES    Skin:  Neil Score: 19  Interventions: float heels, increase time out of bed, and internal/external urinary devices    Patient Safety:  Fall Score:  Total Score: 3  Interventions: bed/chair alarm, assistive device (walker, cane, etc), gripper socks, pt to call before getting OOB, and stay with me (per policy)       Length of Stay:  Expected LOS: 4d 2h  Actual LOS: 3      Austin Kidd RN

## 2022-10-20 NOTE — PROGRESS NOTES
Problem: Falls - Risk of  Goal: *Absence of Falls  Description: Document Jessica Logan Fall Risk and appropriate interventions in the flowsheet. Outcome: Progressing Towards Goal  Note: Fall Risk Interventions:  Mobility Interventions: Patient to call before getting OOB         Medication Interventions: Patient to call before getting OOB    Elimination Interventions: Call light in reach              Problem: Patient Education: Go to Patient Education Activity  Goal: Patient/Family Education  Outcome: Progressing Towards Goal     Problem: Hypertension  Goal: *Blood pressure within specified parameters  Outcome: Progressing Towards Goal  Goal: *Fluid volume balance  Outcome: Progressing Towards Goal  Goal: *Labs within defined limits  Outcome: Progressing Towards Goal     Problem: Patient Education: Go to Patient Education Activity  Goal: Patient/Family Education  Outcome: Progressing Towards Goal     Problem: Diabetes Self-Management  Goal: *Disease process and treatment process  Description: Define diabetes and identify own type of diabetes; list 3 options for treating diabetes. Outcome: Progressing Towards Goal  Goal: *Incorporating nutritional management into lifestyle  Description: Describe effect of type, amount and timing of food on blood glucose; list 3 methods for planning meals. Outcome: Progressing Towards Goal  Goal: *Incorporating physical activity into lifestyle  Description: State effect of exercise on blood glucose levels. Outcome: Progressing Towards Goal  Goal: *Developing strategies to promote health/change behavior  Description: Define the ABC's of diabetes; identify appropriate screenings, schedule and personal plan for screenings. Outcome: Progressing Towards Goal  Goal: *Using medications safely  Description: State effect of diabetes medications on diabetes; name diabetes medication taking, action and side effects.   Outcome: Progressing Towards Goal  Goal: *Monitoring blood glucose, interpreting and using results  Description: Identify recommended blood glucose targets  and personal targets. Outcome: Progressing Towards Goal  Goal: *Prevention, detection, treatment of acute complications  Description: List symptoms of hyper- and hypoglycemia; describe how to treat low blood sugar and actions for lowering  high blood glucose level. Outcome: Progressing Towards Goal  Goal: *Prevention, detection and treatment of chronic complications  Description: Define the natural course of diabetes and describe the relationship of blood glucose levels to long term complications of diabetes.   Outcome: Progressing Towards Goal  Goal: *Developing strategies to address psychosocial issues  Description: Describe feelings about living with diabetes; identify support needed and support network  Outcome: Progressing Towards Goal  Goal: *Insulin pump training  Outcome: Progressing Towards Goal  Goal: *Sick day guidelines  Outcome: Progressing Towards Goal  Goal: *Patient Specific Goal (EDIT GOAL, INSERT TEXT)  Outcome: Progressing Towards Goal     Problem: Patient Education: Go to Patient Education Activity  Goal: Patient/Family Education  Outcome: Progressing Towards Goal     Problem: Patient Education: Go to Patient Education Activity  Goal: Patient/Family Education  Outcome: Progressing Towards Goal     Problem: Unstable angina/NSTEMI: Day of Admission/Day 1  Goal: Off Pathway (Use only if patient is Off Pathway)  Outcome: Progressing Towards Goal  Goal: Activity/Safety  Outcome: Progressing Towards Goal  Goal: Consults, if ordered  Outcome: Progressing Towards Goal  Goal: Diagnostic Test/Procedures  Outcome: Progressing Towards Goal  Goal: Nutrition/Diet  Outcome: Progressing Towards Goal  Goal: Discharge Planning  Outcome: Progressing Towards Goal  Goal: Medications  Outcome: Progressing Towards Goal  Goal: Respiratory  Outcome: Progressing Towards Goal  Goal: Treatments/Interventions/Procedures  Outcome: Progressing Towards Goal  Goal: Psychosocial  Outcome: Progressing Towards Goal  Goal: *Hemodynamically stable  Outcome: Progressing Towards Goal  Goal: *Optimal pain control at patient's stated goal  Outcome: Progressing Towards Goal  Goal: *Lungs clear or at baseline  Outcome: Progressing Towards Goal     Problem: Unstable angina/NSTEMI: Day 2  Goal: Off Pathway (Use only if patient is Off Pathway)  Outcome: Progressing Towards Goal  Goal: Activity/Safety  Outcome: Progressing Towards Goal  Goal: Consults, if ordered  Outcome: Progressing Towards Goal  Goal: Diagnostic Test/Procedures  Outcome: Progressing Towards Goal  Goal: Nutrition/Diet  Outcome: Progressing Towards Goal  Goal: Discharge Planning  Outcome: Progressing Towards Goal  Goal: Medications  Outcome: Progressing Towards Goal  Goal: Respiratory  Outcome: Progressing Towards Goal  Goal: Treatments/Interventions/Procedures  Outcome: Progressing Towards Goal  Goal: Psychosocial  Outcome: Progressing Towards Goal  Goal: *Hemodynamically stable  Outcome: Progressing Towards Goal  Goal: *Optimal pain control at patient's stated goal  Outcome: Progressing Towards Goal  Goal: *Lungs clear or at baseline  Outcome: Progressing Towards Goal     Problem: Unstable Angina/NSTEMI: Discharge Outcomes  Goal: *Hemodynamically stable  Outcome: Progressing Towards Goal  Goal: *Stable cardiac rhythm  Outcome: Progressing Towards Goal  Goal: *Lungs clear or at baseline  Outcome: Progressing Towards Goal  Goal: *Optimal pain control at patient's stated goal  Outcome: Progressing Towards Goal  Goal: *Identifies cardiac risk factors  Outcome: Progressing Towards Goal  Goal: *Verbalizes home exercise program, activity guidelines, cardiac precautions  Outcome: Progressing Towards Goal  Goal: *Verbalizes understanding and describes prescribed diet  Outcome: Progressing Towards Goal  Goal: *Verbalizes name, dosage, time, side effects, and number of days to continue medications  Outcome: Progressing Towards Goal  Goal: *Anxiety reduced or absent  Outcome: Progressing Towards Goal  Goal: *Understands and describes signs and symptoms to report to providers(Stroke Metric)  Outcome: Progressing Towards Goal  Goal: *Describes follow-up/return visits to physicians  Outcome: Progressing Towards Goal  Goal: *Describes available resources and support systems  Outcome: Progressing Towards Goal  Goal: *Influenza immunization  Outcome: Progressing Towards Goal  Goal: *Pneumococcal immunization  Outcome: Progressing Towards Goal  Goal: *Describes smoking cessation resources  Outcome: Progressing Towards Goal     Problem: Risk for Spread of Infection  Goal: Prevent transmission of infectious organism to others  Description: Prevent the transmission of infectious organisms to other patients, staff members, and visitors.   Outcome: Progressing Towards Goal     Problem: Patient Education:  Go to Education Activity  Goal: Patient/Family Education  Outcome: Progressing Towards Goal

## 2022-10-20 NOTE — PROGRESS NOTES
Problem: Falls - Risk of  Goal: *Absence of Falls  Description: Document Jocelin Ny Fall Risk and appropriate interventions in the flowsheet.   10/20/2022 0032 by Antwan Mcneil RN  Outcome: Progressing Towards Goal  Note: Fall Risk Interventions:  Mobility Interventions: Assess mobility with egress test, Patient to call before getting OOB         Medication Interventions: Patient to call before getting OOB    Elimination Interventions: Call light in reach, Patient to call for help with toileting needs           10/19/2022 2351 by Antwan Mcneil RN  Outcome: Progressing Towards Goal  Note: Fall Risk Interventions:  Mobility Interventions: Patient to call before getting OOB         Medication Interventions: Patient to call before getting OOB    Elimination Interventions: Call light in reach              Problem: Patient Education: Go to Patient Education Activity  Goal: Patient/Family Education  10/20/2022 0032 by Antwan Mcneil RN  Outcome: Progressing Towards Goal  10/19/2022 2351 by Antwan Mcneil RN  Outcome: Progressing Towards Goal     Problem: Hypertension  Goal: *Blood pressure within specified parameters  10/20/2022 0032 by Antwan Mcneil RN  Outcome: Progressing Towards Goal  10/19/2022 2351 by Antwan Mcneil RN  Outcome: Progressing Towards Goal  Goal: *Fluid volume balance  10/20/2022 0032 by Antwan Mcneil RN  Outcome: Progressing Towards Goal  10/19/2022 2351 by Antwan Mcneil RN  Outcome: Progressing Towards Goal  Goal: *Labs within defined limits  10/20/2022 0032 by Antwan Mcneil RN  Outcome: Progressing Towards Goal  10/19/2022 2351 by Antwan Mcneil RN  Outcome: Progressing Towards Goal     Problem: Patient Education: Go to Patient Education Activity  Goal: Patient/Family Education  10/20/2022 0032 by Antwan Mcneil RN  Outcome: Progressing Towards Goal  10/19/2022 2351 by Antwan Mcneil RN  Outcome: Progressing Towards Goal     Problem: Diabetes Self-Management  Goal: *Disease process and treatment process  Description: Define diabetes and identify own type of diabetes; list 3 options for treating diabetes. 10/20/2022 0032 by Sharlee Kawasaki, RN  Outcome: Progressing Towards Goal  10/19/2022 2351 by Sharlee Kawasaki, RN  Outcome: Progressing Towards Goal  Goal: *Incorporating nutritional management into lifestyle  Description: Describe effect of type, amount and timing of food on blood glucose; list 3 methods for planning meals. 10/20/2022 0032 by Sharlee Kawasaki, RN  Outcome: Progressing Towards Goal  10/19/2022 2351 by Sharlee Kawasaki, RN  Outcome: Progressing Towards Goal  Goal: *Incorporating physical activity into lifestyle  Description: State effect of exercise on blood glucose levels. 10/20/2022 0032 by Sharlee Kawasaki, RN  Outcome: Progressing Towards Goal  10/19/2022 2351 by Sharlee Kawasaki, RN  Outcome: Progressing Towards Goal  Goal: *Developing strategies to promote health/change behavior  Description: Define the ABC's of diabetes; identify appropriate screenings, schedule and personal plan for screenings. 10/20/2022 0032 by Sharlee Kawasaki, RN  Outcome: Progressing Towards Goal  10/19/2022 2351 by Sharlee Kawasaki, RN  Outcome: Progressing Towards Goal  Goal: *Using medications safely  Description: State effect of diabetes medications on diabetes; name diabetes medication taking, action and side effects. 10/20/2022 0032 by Sharlee Kawasaki, RN  Outcome: Progressing Towards Goal  10/19/2022 2351 by Sharlee Kawasaki, RN  Outcome: Progressing Towards Goal  Goal: *Monitoring blood glucose, interpreting and using results  Description: Identify recommended blood glucose targets  and personal targets.   10/20/2022 0032 by Sharlee Kawasaki, RN  Outcome: Progressing Towards Goal  10/19/2022 2351 by Sharlee Kawasaki, RN  Outcome: Progressing Towards Goal  Goal: *Prevention, detection, treatment of acute complications  Description: List symptoms of hyper- and hypoglycemia; describe how to treat low blood sugar and actions for lowering  high blood glucose level. 10/20/2022 0032 by Megan Rosas RN  Outcome: Progressing Towards Goal  10/19/2022 2351 by Megan Rosas RN  Outcome: Progressing Towards Goal  Goal: *Prevention, detection and treatment of chronic complications  Description: Define the natural course of diabetes and describe the relationship of blood glucose levels to long term complications of diabetes.   10/20/2022 0032 by Megan Rosas RN  Outcome: Progressing Towards Goal  10/19/2022 2351 by Megan Rosas RN  Outcome: Progressing Towards Goal  Goal: *Developing strategies to address psychosocial issues  Description: Describe feelings about living with diabetes; identify support needed and support network  10/20/2022 0032 by Megan Rosas RN  Outcome: Progressing Towards Goal  10/19/2022 2351 by Megan Rosas RN  Outcome: Progressing Towards Goal  Goal: *Insulin pump training  10/20/2022 0032 by Megan Rosas RN  Outcome: Progressing Towards Goal  10/19/2022 2351 by Megan Rosas RN  Outcome: Progressing Towards Goal  Goal: *Sick day guidelines  10/20/2022 0032 by Megan Rosas RN  Outcome: Progressing Towards Goal  10/19/2022 2351 by Megan Rosas RN  Outcome: Progressing Towards Goal  Goal: *Patient Specific Goal (EDIT GOAL, INSERT TEXT)  10/20/2022 0032 by Megan Rosas RN  Outcome: Progressing Towards Goal  10/19/2022 2351 by Megan Rosas RN  Outcome: Progressing Towards Goal     Problem: Patient Education: Go to Patient Education Activity  Goal: Patient/Family Education  10/20/2022 0032 by Megan Rosas RN  Outcome: Progressing Towards Goal  10/19/2022 2351 by Megan Rosas RN  Outcome: Progressing Towards Goal     Problem: Patient Education: Go to Patient Education Activity  Goal: Patient/Family Education  10/20/2022 0032 by Megan Rosas RN  Outcome: Progressing Towards Goal  10/19/2022 2351 by Megan Rosas RN  Outcome: Progressing Towards Goal     Problem: Unstable angina/NSTEMI: Day of Admission/Day 1  Goal: Off Pathway (Use only if patient is Off Pathway)  10/20/2022 0032 by Antwan Mcneil RN  Outcome: Progressing Towards Goal  10/19/2022 2351 by Antwan Mcneil RN  Outcome: Progressing Towards Goal  Goal: Activity/Safety  10/20/2022 0032 by Antwan Mcneil RN  Outcome: Progressing Towards Goal  10/19/2022 2351 by Antwan Mcneil RN  Outcome: Progressing Towards Goal  Goal: Consults, if ordered  10/20/2022 0032 by Antwan Mcneil RN  Outcome: Progressing Towards Goal  10/19/2022 2351 by Antwan Mcneil RN  Outcome: Progressing Towards Goal  Goal: Diagnostic Test/Procedures  10/20/2022 0032 by Antwan Mcneil RN  Outcome: Progressing Towards Goal  10/19/2022 2351 by Antwan Mcneil RN  Outcome: Progressing Towards Goal  Goal: Nutrition/Diet  10/20/2022 0032 by Antwan Mcneil RN  Outcome: Progressing Towards Goal  10/19/2022 2351 by Antwan Mcneil RN  Outcome: Progressing Towards Goal  Goal: Discharge Planning  10/20/2022 0032 by Antwan Mcneil RN  Outcome: Progressing Towards Goal  10/19/2022 2351 by Antwan Mcneil RN  Outcome: Progressing Towards Goal  Goal: Medications  10/20/2022 0032 by Antwan Mcneil RN  Outcome: Progressing Towards Goal  10/19/2022 2351 by Antwan Mcneil RN  Outcome: Progressing Towards Goal  Goal: Respiratory  10/20/2022 0032 by Antwan Mcneil RN  Outcome: Progressing Towards Goal  10/19/2022 2351 by Antwan Mcneli RN  Outcome: Progressing Towards Goal  Goal: Treatments/Interventions/Procedures  10/20/2022 0032 by Antwan Mcneil RN  Outcome: Progressing Towards Goal  10/19/2022 2351 by Antwan Mcneil RN  Outcome: Progressing Towards Goal  Goal: Psychosocial  10/20/2022 0032 by Antwan Mcneil RN  Outcome: Progressing Towards Goal  10/19/2022 2351 by Antwan Mcneil RN  Outcome: Progressing Towards Goal  Goal: *Hemodynamically stable  10/20/2022 0032 by Antwan Mcneil RN  Outcome: Progressing Towards Goal  10/19/2022 2351 by Antwan Mcneil RN  Outcome: Progressing Towards Goal  Goal: *Optimal pain control at patient's stated goal  10/20/2022 0032 by [facility], RN  Outcome: Progressing Towards Goal  10/19/2022 2351 by [facility], RN  Outcome: Progressing Towards Goal  Goal: *Lungs clear or at baseline  10/20/2022 0032 by [facility], RN  Outcome: Progressing Towards Goal  10/19/2022 2351 by [facility], RN  Outcome: Progressing Towards Goal     Problem: Unstable angina/NSTEMI: Day 2  Goal: Off Pathway (Use only if patient is Off Pathway)  10/20/2022 0032 by [facility], RN  Outcome: Progressing Towards Goal  10/19/2022 2351 by [facility], RN  Outcome: Progressing Towards Goal  Goal: Activity/Safety  10/20/2022 0032 by [facility], RN  Outcome: Progressing Towards Goal  10/19/2022 2351 by [facility], RN  Outcome: Progressing Towards Goal  Goal: Consults, if ordered  10/20/2022 0032 by [facility], RN  Outcome: Progressing Towards Goal  10/19/2022 2351 by [facility], RN  Outcome: Progressing Towards Goal  Goal: Diagnostic Test/Procedures  10/20/2022 0032 by [facility], RN  Outcome: Progressing Towards Goal  10/19/2022 2351 by [facility], RN  Outcome: Progressing Towards Goal  Goal: Nutrition/Diet  10/20/2022 0032 by [facility], RN  Outcome: Progressing Towards Goal  10/19/2022 2351 by [facility], RN  Outcome: Progressing Towards Goal  Goal: Discharge Planning  10/20/2022 0032 by [facility], RN  Outcome: Progressing Towards Goal  10/19/2022 2351 by [facility], RN  Outcome: Progressing Towards Goal  Goal: Medications  10/20/2022 0032 by [facility], RN  Outcome: Progressing Towards Goal  10/19/2022 2351 by [facility], RN  Outcome: Progressing Towards Goal  Goal: Respiratory  10/20/2022 0032 by [facility], RN  Outcome: Progressing Towards Goal  10/19/2022 2351 by [facility], RN  Outcome: Progressing Towards Goal  Goal: Treatments/Interventions/Procedures  10/20/2022 0032 by [facility], RN  Outcome: Progressing Towards Goal  10/19/2022 2351 by [facility], RN  Outcome: Progressing Towards Goal  Goal: Psychosocial  10/20/2022 0032 by Cottie [place], RN  Outcome: Progressing Towards Goal  10/19/2022 2351 by Russel Duff RN  Outcome: Progressing Towards Goal  Goal: *Hemodynamically stable  10/20/2022 0032 by Russel Duff RN  Outcome: Progressing Towards Goal  10/19/2022 2351 by Russel Duff RN  Outcome: Progressing Towards Goal  Goal: *Optimal pain control at patient's stated goal  10/20/2022 0032 by Russel Duff RN  Outcome: Progressing Towards Goal  10/19/2022 2351 by Russel Duff RN  Outcome: Progressing Towards Goal  Goal: *Lungs clear or at baseline  10/20/2022 0032 by Russel Duff RN  Outcome: Progressing Towards Goal  10/19/2022 2351 by Russel Duff RN  Outcome: Progressing Towards Goal     Problem: Unstable Angina/NSTEMI: Discharge Outcomes  Goal: *Hemodynamically stable  10/20/2022 0032 by Russel Duff RN  Outcome: Progressing Towards Goal  10/19/2022 2351 by Russel Duff RN  Outcome: Progressing Towards Goal  Goal: *Stable cardiac rhythm  10/20/2022 0032 by Russel Duff RN  Outcome: Progressing Towards Goal  10/19/2022 2351 by Russel Duff RN  Outcome: Progressing Towards Goal  Goal: *Lungs clear or at baseline  10/20/2022 0032 by Russel Duff RN  Outcome: Progressing Towards Goal  10/19/2022 2351 by Russel Duff RN  Outcome: Progressing Towards Goal  Goal: *Optimal pain control at patient's stated goal  10/20/2022 0032 by Russel Duff RN  Outcome: Progressing Towards Goal  10/19/2022 2351 by Russel Duff RN  Outcome: Progressing Towards Goal  Goal: *Identifies cardiac risk factors  10/20/2022 0032 by Russel Duff RN  Outcome: Progressing Towards Goal  10/19/2022 2351 by Russel Duff RN  Outcome: Progressing Towards Goal  Goal: *Verbalizes home exercise program, activity guidelines, cardiac precautions  10/20/2022 0032 by Russel Duff RN  Outcome: Progressing Towards Goal  10/19/2022 2351 by Russel Duff RN  Outcome: Progressing Towards Goal  Goal: *Verbalizes understanding and describes prescribed diet  10/20/2022 0032 by Ro Alvarez RN  Outcome: Progressing Towards Goal  10/19/2022 2351 by Ro Alvarez RN  Outcome: Progressing Towards Goal  Goal: Dewayne Leosy name, dosage, time, side effects, and number of days to continue medications  10/20/2022 0032 by Ro Alvarez RN  Outcome: Progressing Towards Goal  10/19/2022 2351 by Ro Alvarez RN  Outcome: Progressing Towards Goal  Goal: *Anxiety reduced or absent  10/20/2022 0032 by Ro Alvarez RN  Outcome: Progressing Towards Goal  10/19/2022 2351 by Ro Alvarez RN  Outcome: Progressing Towards Goal  Goal: *Understands and describes signs and symptoms to report to providers(Stroke Metric)  10/20/2022 0032 by Ro Alvarez RN  Outcome: Progressing Towards Goal  10/19/2022 2351 by Ro Alvarez RN  Outcome: Progressing Towards Goal  Goal: *Describes follow-up/return visits to physicians  10/20/2022 0032 by Ro Alvarez RN  Outcome: Progressing Towards Goal  10/19/2022 2351 by Ro Alvarez RN  Outcome: Progressing Towards Goal  Goal: *Describes available resources and support systems  10/20/2022 0032 by Ro Alvarez RN  Outcome: Progressing Towards Goal  10/19/2022 2351 by Ro Alvarez RN  Outcome: Progressing Towards Goal  Goal: *Influenza immunization  10/20/2022 0032 by Ro Alvarez RN  Outcome: Progressing Towards Goal  10/19/2022 2351 by Ro Alvarez RN  Outcome: Progressing Towards Goal  Goal: *Pneumococcal immunization  10/20/2022 0032 by Ro Alvarez RN  Outcome: Progressing Towards Goal  10/19/2022 2351 by Ro Alvarez RN  Outcome: Progressing Towards Goal  Goal: *Describes smoking cessation resources  10/20/2022 0032 by Ro Alvarez RN  Outcome: Progressing Towards Goal  10/19/2022 2351 by Ro Alvarez RN  Outcome: Progressing Towards Goal     Problem: Risk for Spread of Infection  Goal: Prevent transmission of infectious organism to others  Description: Prevent the transmission of infectious organisms to other patients, staff members, and visitors.   10/20/2022 0032 by Ro Alvarez RN  Outcome: Progressing Towards Goal  10/19/2022 2351 by Ro Alvarez RN  Outcome: Progressing Towards Goal     Problem: Patient Education:  Go to Education Activity  Goal: Patient/Family Education  10/20/2022 0032 by Ro Alvarez RN  Outcome: Progressing Towards Goal  10/19/2022 2351 by Ro Alvarez RN  Outcome: Progressing Towards Goal

## 2022-10-20 NOTE — PROGRESS NOTES
ADULT PROTOCOL: JET AEROSOL ASSESSMENT    Patient  Jose Cheek     69 y.o.   female     10/20/2022  9:05 AM    Breath Sounds Pre Procedure: Right Breath Sounds: Diminished                               Left Breath Sounds: Diminished    Breath Sounds Post Procedure: Right Breath Sounds: Diminished                                 Left Breath Sounds: Diminished    Breathing pattern: Pre procedure Breathing Pattern: Regular          Post procedure Breathing Pattern: Regular    Heart Rate: Pre procedure Pulse: 86           Post procedure Pulse: 91    Resp Rate: Pre procedure Respirations: 20           Post procedure Respirations: 20      Oxygen: O2 Device: Nasal cannula   O2 Flow Rate (L/min): 2 l/min     Changed: NO    SpO2: Pre procedure SpO2: 93 %   with oxygen              Post procedure SpO2: 97 %  with oxygen    Nebulizer Therapy: Current medications Aerosolized Medications: DuoNeb      Changed: YES    Smoking History:   Social History     Tobacco Use   Smoking Status Former    Packs/day: 1.00    Years: 25.00    Pack years: 25.00    Types: Cigarettes    Quit date: 1999    Years since quittin.0   Smokeless Tobacco Never       Problem List:   Patient Active Problem List   Diagnosis Code    [facility]       Respiratory Therapist: Marisa Art, RT

## 2022-10-21 LAB
1,3 BETA GLUCAN SER-MCNC: <31 PG/ML
ACID FAST STN SPEC: NEGATIVE
APTT PPP: 62 SEC (ref 22.1–31)
APTT PPP: 71.5 SEC (ref 22.1–31)
BASOPHILS # BLD: 0 K/UL (ref 0–0.1)
BASOPHILS NFR BLD: 0 % (ref 0–1)
BNP SERPL-MCNC: 5101 PG/ML
DIFFERENTIAL METHOD BLD: ABNORMAL
EOSINOPHIL # BLD: 0.3 K/UL (ref 0–0.4)
EOSINOPHIL NFR BLD: 3 % (ref 0–7)
ERYTHROCYTE [DISTWIDTH] IN BLOOD BY AUTOMATED COUNT: 15.2 % (ref 11.5–14.5)
GALACTOMANNAN AG SPEC IA-ACNC: 0.05 INDEX (ref 0–0.49)
GLUCOSE BLD STRIP.AUTO-MCNC: 124 MG/DL (ref 65–117)
GLUCOSE BLD STRIP.AUTO-MCNC: 216 MG/DL (ref 65–117)
GLUCOSE BLD STRIP.AUTO-MCNC: 223 MG/DL (ref 65–117)
GLUCOSE BLD STRIP.AUTO-MCNC: 280 MG/DL (ref 65–117)
HCT VFR BLD AUTO: 28.5 % (ref 35–47)
HGB BLD-MCNC: 8.5 G/DL (ref 11.5–16)
IMM GRANULOCYTES # BLD AUTO: 0.1 K/UL (ref 0–0.04)
IMM GRANULOCYTES NFR BLD AUTO: 1 % (ref 0–0.5)
LYMPHOCYTES # BLD: 2.8 K/UL (ref 0.8–3.5)
LYMPHOCYTES NFR BLD: 30 % (ref 12–49)
MCH RBC QN AUTO: 25 PG (ref 26–34)
MCHC RBC AUTO-ENTMCNC: 29.8 G/DL (ref 30–36.5)
MCV RBC AUTO: 83.8 FL (ref 80–99)
MONOCYTES # BLD: 0.9 K/UL (ref 0–1)
MONOCYTES NFR BLD: 9 % (ref 5–13)
MYCOBACTERIUM SPEC QL CULT: NORMAL
NEUTS SEG # BLD: 5.2 K/UL (ref 1.8–8)
NEUTS SEG NFR BLD: 57 % (ref 32–75)
NRBC # BLD: 0 K/UL (ref 0–0.01)
NRBC BLD-RTO: 0 PER 100 WBC
PLATELET # BLD AUTO: 205 K/UL (ref 150–400)
PMV BLD AUTO: 10.2 FL (ref 8.9–12.9)
RBC # BLD AUTO: 3.4 M/UL (ref 3.8–5.2)
SERVICE CMNT-IMP: ABNORMAL
SPECIMEN PREPARATION: NORMAL
SPECIMEN SOURCE: NORMAL
THERAPEUTIC RANGE,PTTT: ABNORMAL SECS (ref 58–77)
THERAPEUTIC RANGE,PTTT: ABNORMAL SECS (ref 58–77)
WBC # BLD AUTO: 9.3 K/UL (ref 3.6–11)

## 2022-10-21 PROCEDURE — 85025 COMPLETE CBC W/AUTO DIFF WBC: CPT

## 2022-10-21 PROCEDURE — C1887 CATHETER, GUIDING: HCPCS | Performed by: INTERNAL MEDICINE

## 2022-10-21 PROCEDURE — 77030019698 HC SYR ANGI MDLON MRTM -A: Performed by: INTERNAL MEDICINE

## 2022-10-21 PROCEDURE — 77030008543 HC TBNG MON PRSS MRTM -A: Performed by: INTERNAL MEDICINE

## 2022-10-21 PROCEDURE — 77030010221 HC SPLNT WR POS TELE -B: Performed by: INTERNAL MEDICINE

## 2022-10-21 PROCEDURE — 74011000250 HC RX REV CODE- 250: Performed by: INTERNAL MEDICINE

## 2022-10-21 PROCEDURE — 93458 L HRT ARTERY/VENTRICLE ANGIO: CPT | Performed by: INTERNAL MEDICINE

## 2022-10-21 PROCEDURE — 74011250636 HC RX REV CODE- 250/636: Performed by: EMERGENCY MEDICINE

## 2022-10-21 PROCEDURE — 74011000258 HC RX REV CODE- 258: Performed by: INTERNAL MEDICINE

## 2022-10-21 PROCEDURE — 36415 COLL VENOUS BLD VENIPUNCTURE: CPT

## 2022-10-21 PROCEDURE — 74011250636 HC RX REV CODE- 250/636: Performed by: INTERNAL MEDICINE

## 2022-10-21 PROCEDURE — 65270000046 HC RM TELEMETRY

## 2022-10-21 PROCEDURE — C1769 GUIDE WIRE: HCPCS | Performed by: INTERNAL MEDICINE

## 2022-10-21 PROCEDURE — 74011000636 HC RX REV CODE- 636: Performed by: INTERNAL MEDICINE

## 2022-10-21 PROCEDURE — 77030004543 HC CATH ANGI DX MRTM -A: Performed by: INTERNAL MEDICINE

## 2022-10-21 PROCEDURE — 94640 AIRWAY INHALATION TREATMENT: CPT

## 2022-10-21 PROCEDURE — 76937 US GUIDE VASCULAR ACCESS: CPT | Performed by: INTERNAL MEDICINE

## 2022-10-21 PROCEDURE — 77030019569 HC BND COMPR RAD TERU -B: Performed by: INTERNAL MEDICINE

## 2022-10-21 PROCEDURE — C1894 INTRO/SHEATH, NON-LASER: HCPCS | Performed by: INTERNAL MEDICINE

## 2022-10-21 PROCEDURE — 4A023N7 MEASUREMENT OF CARDIAC SAMPLING AND PRESSURE, LEFT HEART, PERCUTANEOUS APPROACH: ICD-10-PCS | Performed by: INTERNAL MEDICINE

## 2022-10-21 PROCEDURE — 99233 SBSQ HOSP IP/OBS HIGH 50: CPT | Performed by: INTERNAL MEDICINE

## 2022-10-21 PROCEDURE — 83880 ASSAY OF NATRIURETIC PEPTIDE: CPT

## 2022-10-21 PROCEDURE — 82962 GLUCOSE BLOOD TEST: CPT

## 2022-10-21 PROCEDURE — 74011636637 HC RX REV CODE- 636/637: Performed by: STUDENT IN AN ORGANIZED HEALTH CARE EDUCATION/TRAINING PROGRAM

## 2022-10-21 PROCEDURE — 74011250637 HC RX REV CODE- 250/637: Performed by: NURSE PRACTITIONER

## 2022-10-21 PROCEDURE — B2151ZZ FLUOROSCOPY OF LEFT HEART USING LOW OSMOLAR CONTRAST: ICD-10-PCS | Performed by: INTERNAL MEDICINE

## 2022-10-21 PROCEDURE — 99153 MOD SED SAME PHYS/QHP EA: CPT | Performed by: INTERNAL MEDICINE

## 2022-10-21 PROCEDURE — 99152 MOD SED SAME PHYS/QHP 5/>YRS: CPT | Performed by: INTERNAL MEDICINE

## 2022-10-21 PROCEDURE — 74011250637 HC RX REV CODE- 250/637: Performed by: STUDENT IN AN ORGANIZED HEALTH CARE EDUCATION/TRAINING PROGRAM

## 2022-10-21 PROCEDURE — 2709999900 HC NON-CHARGEABLE SUPPLY: Performed by: INTERNAL MEDICINE

## 2022-10-21 PROCEDURE — 77030029684 HC NEB SM VOL KT MONA -A

## 2022-10-21 PROCEDURE — 85730 THROMBOPLASTIN TIME PARTIAL: CPT

## 2022-10-21 PROCEDURE — B2111ZZ FLUOROSCOPY OF MULTIPLE CORONARY ARTERIES USING LOW OSMOLAR CONTRAST: ICD-10-PCS | Performed by: INTERNAL MEDICINE

## 2022-10-21 PROCEDURE — 77030015766: Performed by: INTERNAL MEDICINE

## 2022-10-21 RX ORDER — HEPARIN SODIUM 200 [USP'U]/100ML
INJECTION, SOLUTION INTRAVENOUS
Status: COMPLETED | OUTPATIENT
Start: 2022-10-21 | End: 2022-10-21

## 2022-10-21 RX ORDER — HEPARIN SODIUM 1000 [USP'U]/ML
INJECTION, SOLUTION INTRAVENOUS; SUBCUTANEOUS AS NEEDED
Status: DISCONTINUED | OUTPATIENT
Start: 2022-10-21 | End: 2022-10-21 | Stop reason: HOSPADM

## 2022-10-21 RX ORDER — MIDAZOLAM HYDROCHLORIDE 1 MG/ML
INJECTION, SOLUTION INTRAMUSCULAR; INTRAVENOUS AS NEEDED
Status: DISCONTINUED | OUTPATIENT
Start: 2022-10-21 | End: 2022-10-21 | Stop reason: HOSPADM

## 2022-10-21 RX ORDER — LIDOCAINE HYDROCHLORIDE 10 MG/ML
INJECTION, SOLUTION EPIDURAL; INFILTRATION; INTRACAUDAL; PERINEURAL AS NEEDED
Status: DISCONTINUED | OUTPATIENT
Start: 2022-10-21 | End: 2022-10-21 | Stop reason: HOSPADM

## 2022-10-21 RX ORDER — VERAPAMIL HYDROCHLORIDE 2.5 MG/ML
INJECTION, SOLUTION INTRAVENOUS AS NEEDED
Status: DISCONTINUED | OUTPATIENT
Start: 2022-10-21 | End: 2022-10-21 | Stop reason: HOSPADM

## 2022-10-21 RX ORDER — FENTANYL CITRATE 50 UG/ML
INJECTION, SOLUTION INTRAMUSCULAR; INTRAVENOUS AS NEEDED
Status: DISCONTINUED | OUTPATIENT
Start: 2022-10-21 | End: 2022-10-21 | Stop reason: HOSPADM

## 2022-10-21 RX ORDER — FUROSEMIDE 40 MG/1
40 TABLET ORAL ONCE
Status: COMPLETED | OUTPATIENT
Start: 2022-10-21 | End: 2022-10-21

## 2022-10-21 RX ADMIN — ATORVASTATIN CALCIUM 10 MG: 10 TABLET, FILM COATED ORAL at 22:17

## 2022-10-21 RX ADMIN — HEPARIN SODIUM 21.5 UNITS/KG/HR: 10000 INJECTION, SOLUTION INTRAVENOUS at 08:54

## 2022-10-21 RX ADMIN — SACUBITRIL AND VALSARTAN 1 TABLET: 24; 26 TABLET, FILM COATED ORAL at 10:47

## 2022-10-21 RX ADMIN — CARVEDILOL 3.12 MG: 3.12 TABLET, FILM COATED ORAL at 17:47

## 2022-10-21 RX ADMIN — AZITHROMYCIN MONOHYDRATE 500 MG: 500 INJECTION, POWDER, LYOPHILIZED, FOR SOLUTION INTRAVENOUS at 20:51

## 2022-10-21 RX ADMIN — EMPAGLIFLOZIN 10 MG: 10 TABLET, FILM COATED ORAL at 08:39

## 2022-10-21 RX ADMIN — CEFEPIME 2 G: 2 INJECTION, POWDER, FOR SOLUTION INTRAVENOUS at 21:10

## 2022-10-21 RX ADMIN — SACUBITRIL AND VALSARTAN 1 TABLET: 24; 26 TABLET, FILM COATED ORAL at 22:17

## 2022-10-21 RX ADMIN — Medication 3 UNITS: at 11:57

## 2022-10-21 RX ADMIN — IPRATROPIUM BROMIDE AND ALBUTEROL SULFATE 3 ML: .5; 3 SOLUTION RESPIRATORY (INHALATION) at 07:33

## 2022-10-21 RX ADMIN — Medication 3 UNITS: at 23:03

## 2022-10-21 RX ADMIN — IPRATROPIUM BROMIDE AND ALBUTEROL SULFATE 3 ML: .5; 3 SOLUTION RESPIRATORY (INHALATION) at 18:46

## 2022-10-21 RX ADMIN — ASPIRIN 81 MG CHEWABLE TABLET 81 MG: 81 TABLET CHEWABLE at 08:39

## 2022-10-21 RX ADMIN — CARVEDILOL 3.12 MG: 3.12 TABLET, FILM COATED ORAL at 08:39

## 2022-10-21 RX ADMIN — CEFEPIME 2 G: 2 INJECTION, POWDER, FOR SOLUTION INTRAVENOUS at 04:38

## 2022-10-21 RX ADMIN — CEFEPIME 2 G: 2 INJECTION, POWDER, FOR SOLUTION INTRAVENOUS at 12:00

## 2022-10-21 RX ADMIN — Medication 3 UNITS: at 08:40

## 2022-10-21 RX ADMIN — FUROSEMIDE 40 MG: 40 TABLET ORAL at 18:33

## 2022-10-21 NOTE — CONSULTS
Pulmonary, Critical Care, and Sleep Medicine    Patient Consult    Name: Pablo Carmona MRN: [id number]   : 1945 Hospital: [facility]   Date: 10/21/2022                IMPRESSION:   Acute Hypoxic Respiratory Failure,resolved. COPD/ Emphysema: stable. May be ILD versus Pulmonary Edema  Micronodules of the lingula, may be atypical AFB, low procalcitonin. One of AFB negative at this point. All collected. Decreased LVEF, NSTEMI- for a life vest.   Leukocytosis. Anemia: hgb of 10.5. Left thyroid nodule measures 4.5cm. No Pulmonary Embolus  Increased troponin. Type 2 DM  Obesity. Ex smoker, started smoking at age of 22. Quit about 20 year ago. AGree with discharge home from pulmonary standpoint. RECOMMENDATIONS:   ON RA  Can be discharged home. Can see me as an outpt. No need for bronch at this point. PCP: Simone      Subjective:       10-: PT is feeling well. NO chest pain, no back pain, no leg pain. Has some sputum production. NO sinus issues. Off oxygen. Ready to go home. 10/20/2022  Patient is feeling pretty well. She is sitting up in a chair when seen. She has a nonproductive cough. She denies any sinus pain or pressure. Denies any chest pain or pressure. She reports her breathing feels pretty good. She has not been on home oxygen prior to this admission will need further evaluation to evaluate if she needs home oxygen. NO leg pain. NO issues sleeping. This patient has been seen and evaluated at the request of Dr. Thuy Connors for above. Patient is a 69 y.o. female who presents for above. Has been coughing, congested, noted to have worsening dyspnea. Has concerned for allergies. Cough has been worse for 3 weeks. Has been having some chest pain. NO fevers, no chills, no sweats. NO issues with aspiration. Has a hx of DM, followed by  Dr. Gerhardt Bass. Pt has been with coughing and congestion.        Past Medical History:   Diagnosis Date Diabetes ([facility] [address]     Hypertension       Past Surgical History:   Procedure Laterality Date    COLONOSCOPY N/A 2020    COLONOSCOPY performed by Jose Ramon Sinclair MD at [facility] ENDOSCOPY    COLONOSCOPY N/A 2022    COLONOSCOPY performed by Gwen Segura MD at [facility] ENDOSCOPY    COLONOSCOPY,ASHANTI AVINA,SNARE  2020         HX BREAST BIOPSY Left     40 yrs ago--neg    HX CATARACT REMOVAL Bilateral     HX HEENT      upper lid lift    HX ORTHOPAEDIC Bilateral     carpal tunnel release    HX OTHER SURGICAL      colonoscopy      Prior to Admission medications    Medication Sig Start Date End Date Taking? Authorizing Provider   calcium-cholecalciferol, d3, (CALCIUM 600 + D) 600-125 mg-unit tab Take  by mouth daily. Yes Provider, Historical   metFORMIN (GLUCOPHAGE) 1,000 mg tablet Take 1,000 mg by mouth two (2) times daily (with meals). Yes Provider, Historical   furosemide (LASIX) 20 mg tablet Take 20 mg by mouth as needed. Yes Provider, Historical   losartan (COZAAR) 50 mg tablet Take  by mouth daily. Yes Other, MD Mare   lovastatin (MEVACOR) 20 mg tablet Take 20 mg by mouth nightly. Yes Other, MD Mare   glimepiride (AMARYL) 1 mg tablet Take 2 mg by mouth every morning. Yes Francisco J, MD Mare   aspirin 81 mg chewable tablet Take 81 mg by mouth daily. Yes Francisco J, MD Mare   DOCOSAHEXANOIC ACID/EPA (FISH OIL PO) Take 1 Cap by mouth two (2) times a day.   Patient not taking: Reported on 10/17/2022    Other, MD Mare     Allergies   Allergen Reactions    Bactrim [Sulfamethoprim] Hives and Itching      Social History     Tobacco Use    Smoking status: Former     Packs/day: 1.00     Years: 25.00     Pack years: 25.00     Types: Cigarettes     Quit date: 1999     Years since quittin.0    Smokeless tobacco: Never   Substance Use Topics    Alcohol use: Never     Comment: none in 27 years      Family History   Problem Relation Age of Onset    Diabetes Mother     Hypertension Mother     Hypertension Father Other Father         cerebral aneurysm    Diabetes Brother     Hypertension Brother     Lung Disease Brother     Diabetes Sister         Current Facility-Administered Medications   Medication Dose Route Frequency    furosemide (LASIX) tablet 40 mg  40 mg Oral ONCE    albuterol-ipratropium (DUO-NEB) 2.5 MG-0.5 MG/3 ML  3 mL Nebulization TID RT    empagliflozin (JARDIANCE) tablet 10 mg  10 mg Oral DAILY    sacubitriL-valsartan (ENTRESTO) 24-26 mg tablet 1 Tablet  1 Tablet Oral Q12H    atorvastatin (LIPITOR) tablet 10 mg  10 mg Oral QHS    insulin lispro (HUMALOG) injection 1-8 Units  1-8 Units SubCUTAneous AC&HS    azithromycin (ZITHROMAX) 500 mg in 0.9% sodium chloride 250 mL ([id number])  500 mg IntraVENous Q24H    cefepime (MAXIPIME) 2 g in 0.9% sodium chloride (MBP/ADV) 100 mL MBP  2 g IntraVENous Q8H    heparin 25,000 units in D5W 250 ml infusion  9.5-25 Units/kg/hr IntraVENous TITRATE    aspirin chewable tablet 81 mg  81 mg Oral DAILY    carvediloL (COREG) tablet 3.125 mg  3.125 mg Oral BID WITH MEALS       Review of Systems:  Constitutional: positive for fatigue and malaise  Eyes: negative  Ears, nose, mouth, throat, and face: positive for nasal congestion  Respiratory: positive for cough, sputum, wheezing, dyspnea on exertion, or chronic bronchitis  Cardiovascular: positive for chest pain, chest pressure/discomfort, fatigue, paroxysmal nocturnal dyspnea, lower extremity edema, tachypnea, dyspnea on exertion  Gastrointestinal: positive for dyspepsia  Genitourinary:negative  Integument/breast: negative  Hematologic/lymphatic: negative  Musculoskeletal:negative  Neurological: negative  Behavioral/Psych: negative  Endocrine: negative  Allergic/Immunologic: negative    Objective:   Vital Signs:    Visit Vitals  BP (!) 121/51   Pulse 80   Temp 99 °F (37.2 °C)   Resp 16   Ht 5' 3\" (1.6 m)   Wt 107.2 kg (236 lb 4.8 oz)   SpO2 92%   BMI 41.86 kg/m²       O2 Device: None (Room air)   O2 Flow Rate (L/min): 2 l/min Temp (24hrs), Av.6 °F (37 °C), Min:97.9 °F (36.6 °C), Max:99 °F (37.2 °C)       Intake/Output:   Last shift:      No intake/output data recorded. Last 3 shifts: 10/19 1901 - 10/21 0700  In: 2611.5 [P.O.:740; I.V.:1871.5]  Out: 1200 [Urine:1200]    Intake/Output Summary (Last 24 hours) at 10/21/2022 1106  Last data filed at 10/21/2022 0319  Gross per 24 hour   Intake 2429.53 ml   Output 800 ml   Net 1629.53 ml        Physical Exam:   General:  Alert, cooperative, no distress, appears stated age. Sitting up in chair. On RA with pox of 94%. Head:  Normocephalic, without obvious abnormality, atraumatic. Eyes:  Conjunctivae/corneas clear. PERRL, EOMs intact. Nose: Nares normal. Septum midline. Mucosa normal. No drainage or sinus tenderness. Throat: Lips, mucosa, and tongue normal. Teeth and gums normal.   Neck: Supple, symmetrical, trachea midline, no adenopathy, thyroid: no enlargment/tenderness/nodules, no carotid bruit and no JVD. Back:   Symmetric, no curvature. ROM normal.   Lungs:   Clear to auscultation bilaterally. Chest wall:  No tenderness or deformity. Heart:  Regular rate and rhythm, S1, S2 normal, no murmur, click, rub or gallop. Abdomen:   Soft, non-tender. Bowel sounds normal. No masses,  No organomegaly. Obese. Extremities: Extremities normal, atraumatic, no cyanosis or edema. Pulses: 2+ and symmetric all extremities. Skin: Skin color, texture, turgor normal. No rashes or lesions   Lymph nodes: Cervical, supraclavicular, and axillary nodes normal.   Neurologic: Grossly nonfocal, motor is intact.       Data review:     Recent Results (from the past 24 hour(s))   PTT    Collection Time: 10/20/22  2:13 PM   Result Value Ref Range    aPTT 38.7 (H) 22.1 - 31.0 sec    aPTT, therapeutic range     58.0 - 77.0 SECS   GLUCOSE, POC    Collection Time: 10/20/22  4:25 PM   Result Value Ref Range    Glucose (POC) 133 (H) 65 - 117 mg/dL    Performed by Andria Neal PCT    PTT    Collection Time: 10/20/22  4:29 PM   Result Value Ref Range    aPTT 46.0 (H) 22.1 - 31.0 sec    aPTT, therapeutic range     58.0 - 77.0 SECS   GLUCOSE, POC    Collection Time: 10/20/22  9:07 PM   Result Value Ref Range    Glucose (POC) 181 (H) 65 - 117 mg/dL    Performed by Melon Power PCT    PTT    Collection Time: 10/21/22 12:06 AM   Result Value Ref Range    aPTT 62.0 (H) 22.1 - 31.0 sec    aPTT, therapeutic range     58.0 - 77.0 SECS   CBC WITH AUTOMATED DIFF    Collection Time: 10/21/22 12:06 AM   Result Value Ref Range    WBC 9.3 3.6 - 11.0 K/uL    RBC 3.40 (L) 3.80 - 5.20 M/uL    HGB 8.5 (L) 11.5 - 16.0 g/dL    HCT 28.5 (L) 35.0 - 47.0 %    MCV 83.8 80.0 - 99.0 FL    MCH 25.0 (L) 26.0 - 34.0 PG    MCHC 29.8 (L) 30.0 - 36.5 g/dL    RDW 15.2 (H) 11.5 - 14.5 %    PLATELET 829 599 - 313 K/uL    MPV 10.2 8.9 - 12.9 FL    NRBC 0.0 0  WBC    ABSOLUTE NRBC 0.00 0.00 - 0.01 K/uL    NEUTROPHILS 57 32 - 75 %    LYMPHOCYTES 30 12 - 49 %    MONOCYTES 9 5 - 13 %    EOSINOPHILS 3 0 - 7 %    BASOPHILS 0 0 - 1 %    IMMATURE GRANULOCYTES 1 (H) 0.0 - 0.5 %    ABS. NEUTROPHILS 5.2 1.8 - 8.0 K/UL    ABS. LYMPHOCYTES 2.8 0.8 - 3.5 K/UL    ABS. MONOCYTES 0.9 0.0 - 1.0 K/UL    ABS. EOSINOPHILS 0.3 0.0 - 0.4 K/UL    ABS. BASOPHILS 0.0 0.0 - 0.1 K/UL    ABS. IMM.  GRANS. 0.1 (H) 0.00 - 0.04 K/UL    DF AUTOMATED     NT-PRO BNP    Collection Time: 10/21/22 12:06 AM   Result Value Ref Range    NT pro-BNP 5,101 (H) <450 PG/ML   PTT    Collection Time: 10/21/22  6:02 AM   Result Value Ref Range    aPTT 71.5 (H) 22.1 - 31.0 sec    aPTT, therapeutic range     58.0 - 77.0 SECS   GLUCOSE, POC    Collection Time: 10/21/22  8:34 AM   Result Value Ref Range    Glucose (POC) 223 (H) 65 - 117 mg/dL    Performed by Elsie Fuchs    GLUCOSE, POC    Collection Time: 10/21/22 10:49 AM   Result Value Ref Range    Glucose (POC) 216 (H) 65 - 117 mg/dL    Performed by Alba Bernal PCT        Imaging:  I have personally reviewed the patients radiographs and have reviewed the reports:  10-17-22: CT of chest:   IMPRESSION    1. No visualized pulmonary embolus. 2. Centrilobular emphysema. 3. Possible interstitial prominence which could represent vascular congestion. 4. Query micronodularity in the lingula which may represent an atypical  infectious process such as mycobacterium. 5. Left thyroid nodule measuring up to 4.5 cm. Recommend nonemergent ultrasound  for further evaluation if not already performed at an outside facility. 6. Incidental findings as above.         Judah Mariee MD

## 2022-10-21 NOTE — PROGRESS NOTES
[facility]  AgnesDony Denisemelissa SALAZARromanly [address] [place] [address]  [phone]  [email or web address]    Brief Procedure Note    Patient: Luis Andrade MRN: [id number]  SSN: [id number]    YOB: 1945  Age: 69 y.o. Sex: female      Date of Procedure: 10/21/2022     Pre-procedure Diagnosis: NSTEMI    Post-procedure Diagnosis: NSTEMI    Procedure: LHC    Performed By: Minor Snellen, MD     Anesthesia: Moderate Sedation    Estimated Blood Loss: Less than 10 mL      Specimens:  None    Findings: normal coronaries, LV gram-not done, unable to advance catheter into LV    Complications: None    Implants: None    Recommendations: Continue medical therapy.     Signed By: Minor Snellen, MD     October 21, 2022

## 2022-10-21 NOTE — PROGRESS NOTES
[facility]  [address]  [phone]  [email or web address]         Cardiology Progress Note      10/21/2022 8:40 AM    Admit Date: 10/17/2022    Admit Diagnosis:   NSTEMI (non-ST elevated myocardial infarction) ([address] [I21.4]    Interval History/Subjective:     Sarah Day has no complaints. Discussed findings of echocardiogram today.     Visit Vitals  BP (!) 132/54 (BP 1 Location: Left upper arm, BP Patient Position: Sitting)   Pulse 81   Temp 99 °F (37.2 °C)   Resp 20   Ht 5' 3\" (1.6 m)   Wt 107.2 kg (236 lb 4.8 oz)   SpO2 92%   BMI 41.86 kg/m²       Current Facility-Administered Medications   Medication Dose Route Frequency    furosemide (LASIX) tablet 40 mg  40 mg Oral ONCE    albuterol-ipratropium (DUO-NEB) 2.5 MG-0.5 MG/3 ML  3 mL Nebulization TID RT    empagliflozin (JARDIANCE) tablet 10 mg  10 mg Oral DAILY    sacubitriL-valsartan (ENTRESTO) 24-26 mg tablet 1 Tablet  1 Tablet Oral Q12H    furosemide (LASIX) tablet 20 mg  20 mg Oral PRN    atorvastatin (LIPITOR) tablet 10 mg  10 mg Oral QHS    glucagon (GLUCAGEN) injection 1 mg  1 mg IntraMUSCular PRN    dextrose 10 % infusion 250 mL  250 mL IntraVENous DIALYSIS PRN    glucose chewable tablet 16 g  16 g Oral PRN    insulin lispro (HUMALOG) injection 1-8 Units  1-8 Units SubCUTAneous AC&HS    azithromycin (ZITHROMAX) 500 mg in 0.9% sodium chloride 250 mL ([id number])  500 mg IntraVENous Q24H    cefepime (MAXIPIME) 2 g in 0.9% sodium chloride (MBP/ADV) 100 mL MBP  2 g IntraVENous Q8H    heparin 25,000 units in D5W 250 ml infusion  9.5-25 Units/kg/hr IntraVENous TITRATE    heparin (porcine) 1,000 unit/mL injection 2,000 Units  2,000 Units IntraVENous PRN    Or    heparin (porcine) 1,000 unit/mL injection 4,000 Units  4,000 Units IntraVENous PRN    morphine injection 1 mg  1 mg IntraVENous Q4H PRN    aspirin chewable tablet 81 mg  81 mg Oral DAILY    nitroglycerin (NITROSTAT) tablet 0.4 mg 0.4 mg SubLINGual PRN    carvediloL (COREG) tablet 3.125 mg  3.125 mg Oral BID WITH MEALS       Objective:      Physical Exam:  General: Well developed, in no acute distress, cooperative and alert  HEENT: No carotid bruits, PEERL, EOM intact. Heart:  reg rate and rhythm; normal S1/S2; no murmurs  Respiratory: Clear bilaterally x 4, no wheezing or rales  Abdomen:   Soft, non-tender, no distention, no masses. + BS. Extremities:  Normal cap refill, no cyanosis, atraumatic. No edema. Neuro: A&Ox3, speech clear  Skin: Skin color is normal. Non diaphoretic  Vascular: 2+ pulses symmetric in all extremities    Data Review:   Recent Labs     10/21/22  0006   WBC 9.3   HGB 8.5*   HCT 28.5*        Recent Labs     10/19/22  0433   *   K 3.9      CO2 23   *   BUN 18   CREA 0.87   CA 9.4       No results for input(s): TROIQ, CPK, CKMB in the last 72 hours. Intake/Output Summary (Last 24 hours) at 10/21/2022 0923  Last data filed at 10/21/2022 0319  Gross per 24 hour   Intake 2429.53 ml   Output 800 ml   Net 1629.53 ml        Telemetry: sinus rhythm    Echocardiogram (10/20/22): Left Ventricle: Reduced left ventricular systolic function with a visually estimated EF of 25 - 30%. Left ventricle size is normal. Increased wall thickness. Global hypokinesis present. Normal diastolic function. Right Ventricle: Reduced systolic function. Radiology Results in the last 24 hours:  No results found. Assessment:     Active Problems:    NSTEMI (non-ST elevated myocardial infarction) ([facility] [address] (10/17/2022)      Plan:     NSTEMI  Trops peaked 2018, now downtrending  No CP symptoms  Plan for cath once she is off airborne isolation  Echo pending  DC heparin gtt (has been > 48 hours since presentation)  Continue BB, statin, ASA    2.   Acute systolic heart failure  Echo done with reduced LVEF 25-30%  BNP elevated 5K  Give PO lasix 40 mg now  Started Entresto, carvedilol and Jardiance  Cath once off airborne isolation  Will order LifeVest to be fitted prior to discharge    Chiki Johnson NP

## 2022-10-21 NOTE — PROGRESS NOTES
Discussed with ID. [employer] is negative. Now off airborne isolation. Will proceed with left heart catheterization this afternoon with Dr. Rin Apodaca. She has been NPO since breakfast.    I have discussed the procedure, including the risks, benefits and alternatives. Risks including, but not limited to, death, cardiac tamponade, arrhythmia, renal failure, infection, bleeding, and stroke. She does desire to proceed. Tawny Silveira NP  10/21/2022  12:46 PM    [facility]  [address] [place] [address]  [phone]  www. [facility]. [facility]

## 2022-10-21 NOTE — PROGRESS NOTES
Problem: Falls - Risk of  Goal: *Absence of Falls  Description: Document Marcus Bobos Fall Risk and appropriate interventions in the flowsheet. Outcome: Progressing Towards Goal  Note: Fall Risk Interventions:  Mobility Interventions: Patient to call before getting OOB         Medication Interventions: Patient to call before getting OOB, Teach patient to arise slowly    Elimination Interventions: Call light in reach, Toileting schedule/hourly rounds              Problem: Patient Education: Go to Patient Education Activity  Goal: Patient/Family Education  Outcome: Progressing Towards Goal     Problem: Hypertension  Goal: *Blood pressure within specified parameters  Outcome: Progressing Towards Goal  Goal: *Fluid volume balance  Outcome: Progressing Towards Goal  Goal: *Labs within defined limits  Outcome: Progressing Towards Goal     Problem: Patient Education: Go to Patient Education Activity  Goal: Patient/Family Education  Outcome: Progressing Towards Goal     Problem: Diabetes Self-Management  Goal: *Disease process and treatment process  Description: Define diabetes and identify own type of diabetes; list 3 options for treating diabetes. Outcome: Progressing Towards Goal  Goal: *Incorporating nutritional management into lifestyle  Description: Describe effect of type, amount and timing of food on blood glucose; list 3 methods for planning meals. Outcome: Progressing Towards Goal  Goal: *Incorporating physical activity into lifestyle  Description: State effect of exercise on blood glucose levels. Outcome: Progressing Towards Goal  Goal: *Developing strategies to promote health/change behavior  Description: Define the ABC's of diabetes; identify appropriate screenings, schedule and personal plan for screenings. Outcome: Progressing Towards Goal  Goal: *Using medications safely  Description: State effect of diabetes medications on diabetes; name diabetes medication taking, action and side effects.   Outcome: Progressing Towards Goal  Goal: *Monitoring blood glucose, interpreting and using results  Description: Identify recommended blood glucose targets  and personal targets. Outcome: Progressing Towards Goal  Goal: *Prevention, detection, treatment of acute complications  Description: List symptoms of hyper- and hypoglycemia; describe how to treat low blood sugar and actions for lowering  high blood glucose level. Outcome: Progressing Towards Goal  Goal: *Prevention, detection and treatment of chronic complications  Description: Define the natural course of diabetes and describe the relationship of blood glucose levels to long term complications of diabetes.   Outcome: Progressing Towards Goal  Goal: *Developing strategies to address psychosocial issues  Description: Describe feelings about living with diabetes; identify support needed and support network  Outcome: Progressing Towards Goal  Goal: *Insulin pump training  Outcome: Progressing Towards Goal  Goal: *Sick day guidelines  Outcome: Progressing Towards Goal  Goal: *Patient Specific Goal (EDIT GOAL, INSERT TEXT)  Outcome: Progressing Towards Goal     Problem: Patient Education: Go to Patient Education Activity  Goal: Patient/Family Education  Outcome: Progressing Towards Goal     Problem: Patient Education: Go to Patient Education Activity  Goal: Patient/Family Education  Outcome: Progressing Towards Goal     Problem: Unstable angina/NSTEMI: Day of Admission/Day 1  Goal: Off Pathway (Use only if patient is Off Pathway)  Outcome: Progressing Towards Goal  Goal: Activity/Safety  Outcome: Progressing Towards Goal  Goal: Consults, if ordered  Outcome: Progressing Towards Goal  Goal: Diagnostic Test/Procedures  Outcome: Progressing Towards Goal  Goal: Nutrition/Diet  Outcome: Progressing Towards Goal  Goal: Discharge Planning  Outcome: Progressing Towards Goal  Goal: Medications  Outcome: Progressing Towards Goal  Goal: Respiratory  Outcome: Progressing Towards Goal  Goal: Treatments/Interventions/Procedures  Outcome: Progressing Towards Goal  Goal: Psychosocial  Outcome: Progressing Towards Goal  Goal: *Hemodynamically stable  Outcome: Progressing Towards Goal  Goal: *Optimal pain control at patient's stated goal  Outcome: Progressing Towards Goal  Goal: *Lungs clear or at baseline  Outcome: Progressing Towards Goal     Problem: Unstable angina/NSTEMI: Day 2  Goal: Off Pathway (Use only if patient is Off Pathway)  Outcome: Progressing Towards Goal  Goal: Activity/Safety  Outcome: Progressing Towards Goal  Goal: Consults, if ordered  Outcome: Progressing Towards Goal  Goal: Diagnostic Test/Procedures  Outcome: Progressing Towards Goal  Goal: Nutrition/Diet  Outcome: Progressing Towards Goal  Goal: Discharge Planning  Outcome: Progressing Towards Goal  Goal: Medications  Outcome: Progressing Towards Goal  Goal: Respiratory  Outcome: Progressing Towards Goal  Goal: Treatments/Interventions/Procedures  Outcome: Progressing Towards Goal  Goal: Psychosocial  Outcome: Progressing Towards Goal  Goal: *Hemodynamically stable  Outcome: Progressing Towards Goal  Goal: *Optimal pain control at patient's stated goal  Outcome: Progressing Towards Goal  Goal: *Lungs clear or at baseline  Outcome: Progressing Towards Goal     Problem: Unstable Angina/NSTEMI: Discharge Outcomes  Goal: *Hemodynamically stable  Outcome: Progressing Towards Goal  Goal: *Stable cardiac rhythm  Outcome: Progressing Towards Goal  Goal: *Lungs clear or at baseline  Outcome: Progressing Towards Goal  Goal: *Optimal pain control at patient's stated goal  Outcome: Progressing Towards Goal  Goal: *Identifies cardiac risk factors  Outcome: Progressing Towards Goal  Goal: *Verbalizes home exercise program, activity guidelines, cardiac precautions  Outcome: Progressing Towards Goal  Goal: *Verbalizes understanding and describes prescribed diet  Outcome: Progressing Towards Goal  Goal: *Verbalizes name, dosage, time, side effects, and number of days to continue medications  Outcome: Progressing Towards Goal  Goal: *Anxiety reduced or absent  Outcome: Progressing Towards Goal  Goal: *Understands and describes signs and symptoms to report to providers(Stroke Metric)  Outcome: Progressing Towards Goal  Goal: *Describes follow-up/return visits to physicians  Outcome: Progressing Towards Goal  Goal: *Describes available resources and support systems  Outcome: Progressing Towards Goal  Goal: *Influenza immunization  Outcome: Progressing Towards Goal  Goal: *Pneumococcal immunization  Outcome: Progressing Towards Goal  Goal: *Describes smoking cessation resources  Outcome: Progressing Towards Goal     Problem: Risk for Spread of Infection  Goal: Prevent transmission of infectious organism to others  Description: Prevent the transmission of infectious organisms to other patients, staff members, and visitors.   Outcome: Progressing Towards Goal     Problem: Patient Education:  Go to Education Activity  Goal: Patient/Family Education  Outcome: Progressing Towards Goal

## 2022-10-21 NOTE — PROGRESS NOTES
Hospitalist Progress Note    NAME: Jonathan Montesinos   :  1945   MRN:  [id number]       Assessment / Plan:    NSTEMI  - s/p LHC 10/21 normal coronaries  - on RA  - Morphine PNR  - Nitrtes  - lipitor, cozaar, ASA, coreg    Sepsis leukocytosis 15.9 and tachycardia 130  ? Acute PNA, possibly 2/2 Mycobacterium  - airborne Isolation  - Quantiferon gold and AFB pending  - cont. cefepime and azithro for 7 days  - no indication for bronch at this time  - ID consult appreciate recs       DM  - hold home Metformin and glipizide  - RISS - normal     Centrilobular Emphysema  - Duoneb; needs outpt PFT    40 or above Morbid obesity / Body mass index is 41.86 kg/m². Estimated discharge date:   Barriers: s/p cath dc in am    Code status: Full  Prophylaxis:  heparin gtt  Recommended Disposition: Home w/Family     Subjective:     Chief Complaint / Reason for Physician Visit  \"sob\". Discussed with RN events overnight. Sob improving, denies chest pain, cough, nausea or vomiting    Review of Systems:  Symptom Y/N Comments  Symptom Y/N Comments   Fever/Chills n   Chest Pain n    Poor Appetite    Edema     Cough n   Abdominal Pain n    Sputum    Joint Pain     SOB/VEGAS n   Pruritis/Rash     Nausea/vomit    Tolerating PT/OT     Diarrhea    Tolerating Diet     Constipation    Other       Could NOT obtain due to:      Objective:     VITALS:   Last 24hrs VS reviewed since prior progress note.  Most recent are:  Patient Vitals for the past 24 hrs:   Temp Pulse Resp BP SpO2   10/21/22 1553 98.2 °F (36.8 °C) 73 22 118/65 97 %   10/21/22 1045 98.8 °F (37.1 °C) 80 16 (!) 121/51 92 %   10/21/22 0835 99 °F (37.2 °C) 81 20 (!) 132/54 92 %   10/21/22 0734 -- -- -- -- 94 %   10/21/22 0321 98.3 °F (36.8 °C) 72 18 91/76 93 %   10/20/22 2335 97.9 °F (36.6 °C) 73 22 (!) 128/52 94 %   10/20/22 2300 -- -- -- -- 95 %   10/20/22 1942 99 °F (37.2 °C) 81 24 (!) 121/58 94 %         Intake/Output Summary (Last 24 hours) at 10/21/2022 [id number] [facility] filed at 10/21/2022 1202  Gross per 24 hour   Intake 2729.53 ml   Output 1100 ml   Net 1629.53 ml          I had a face to face encounter and independently examined this patient on 10/21/2022, as outlined below:  PHYSICAL EXAM:  General:  Alert, obese no acute distress    Resp:  Bibasilar rales,  No accessory muscle use  CV:  Regular  rhythm,  No edema  GI:  Soft, Non distended, Non tender. +Bowel sounds  Neurologic:  Alert and oriented X 3, normal speech,   Psych:   Not anxious nor agitated  Skin:  No rashes. No jaundice    Reviewed most current lab test results and cultures  YES  Reviewed most current radiology test results   YES  Review and summation of old records today    NO  Reviewed patient's current orders and MAR    YES  PMH/SH reviewed - no change compared to H&P  ________________________________________________________________________  Care Plan discussed with:    Comments   Patient x    Family      RN x    Care Manager     Consultant                       x Multidiciplinary team rounds were held today with , nursing, pharmacist and clinical coordinator. Patient's plan of care was discussed; medications were reviewed and discharge planning was addressed. ________________________________________________________________________  Total NON critical care TIME:  40   Minutes    Total CRITICAL CARE TIME Spent:   Minutes non procedure based      Comments   >50% of visit spent in counseling and coordination of care x    ________________________________________________________________________  Brent Lewis MD     Procedures: see electronic medical records for all procedures/Xrays and details which were not copied into this note but were reviewed prior to creation of Plan. LABS:  I reviewed today's most current labs and imaging studies.   Pertinent labs include:  Recent Labs     10/21/22  0006   WBC 9.3   HGB 8.5*   HCT 28.5*          Recent Labs     10/19/22  6350 *   K 3.9      CO2 23   *   BUN 18   CREA 0.87   CA 9.4         Signed: Franki Rosado MD

## 2022-10-21 NOTE — PROGRESS NOTES
ADULT PROTOCOL: JET AEROSOL ASSESSMENT    Patient  Bettie Croft     69 y.o.   female     10/21/2022  7:50 AM    Breath Sounds Pre Procedure: Right Breath Sounds: Diminished                               Left Breath Sounds: Diminished    Breath Sounds Post Procedure: Right Breath Sounds: Diminished                                 Left Breath Sounds: Diminished    Breathing pattern: Pre procedure Breathing Pattern: Regular          Post procedure Breathing Pattern: Regular    Heart Rate: Pre procedure Pulse: 78           Post procedure Pulse: 84    Resp Rate: Pre procedure Respirations: 18           Post procedure Respirations: 18                   Post procedure      Oxygen: O2 Device: None (Room air)   O2 Flow Rate (L/min): 2 l/min     Changed: NO    SpO2: Pre procedure SpO2: 94 %   without oxygen              Post procedure SpO2: 99 %  without oxygen    Nebulizer Therapy: Current medications Aerosolized Medications: DuoNeb      Changed: NO    Smoking History:   Social History     Tobacco Use   Smoking Status Former    Packs/day: 1.00    Years: 25.00    Pack years: 25.00    Types: Cigarettes    Quit date: 1999    Years since quittin.0   Smokeless Tobacco Never       Problem List:   Patient Active Problem List   Diagnosis Code    NSTEMI (non-ST elevated myocardial infarction) ([facility] I21.4       Respiratory Therapist: Destiny Caro RT

## 2022-10-21 NOTE — PROGRESS NOTES
Occupational Therapy    Patient chart reviewed up to date. Attempted visit, however patient currently EFFIE to [address] [facility]. Plan to defer at this time; will continue to follow as able and appropriate. Thank you.   Candy Lara, OTR/L

## 2022-10-21 NOTE — PROGRESS NOTES
Infectious Disease Progress      Impression  Acute pneumonia clinically improved  Leukocytosis resolved  Concern for atypical mycobacteria based on CTA  findings of micro nodularity in lingula  CTA chest + Centrilobular emphysema. Possible interstitial prominence which could represent vascular congestion. Micronodularity in the lingula which may represent an atypical   infectious process such as mycobacterium. -Appreciate pulmonary input-ILD versus pulmonary edema. D/w Dr Nurys Paul, pt is cleared for DC      NSTEMI  Elevated troponin  BNP 5101  Cardiology following-Per cardiology  Will need cardiac cath,  Plan for cardiac cath once TB status is confirmed. Patient has 1 negative AFB  Likelihood of TB very unlikely       Diabetes M mellitus  A1c 6.9    Acute hypoxic respiratory failure  Resolved     Emphysema   On Duonebs     Morbid obesity  BMI 40.89. Plan  -Continue cefepime and azithromycin x 7 days  -Patient is low risk for mycobacterial TB- recommend DC of negative pressure isolation  -D/w pulmonary-patient may follow-up with them for further work-up. -D/w cardiology-patient is cleared for cardiac cath from ID standpoint. Bettie Croft is 69 y.o. female with PMHx significant for diabetes, hypertension, who presented with a chief complaint of cough and shortness of breath. Patient reported she has had a cough and URI symptoms for the last week which she attributed to allergies. She was at her primary care doctor on Thursday and was told to continue using Flonase and Zyrtec. She became acutely more short of breath today prompting her trip to the emergency department. She denied associated shortness of breath or fever. No  known sick contacts. No abdominal pain, nausea, vomiting. WBC at time of admission 18.1, creatinine 0.87. CXR-diffuse bilateral interstitial markings representing interstitial pneumonia. CTA chest + FINDINGS:  CHEST:  Chest wall/thoracic inlet: Within normal limits.   Thyroid: Thyroid nodule measuring approximately 4.5 x 3.1 cm at the thoracic  inlet which causes mild mass effect upon the trachea. Mediastinum/cameron: Patulous esophagus. Heart/vessels: Calcifications in the coronary arteries. Lungs/Pleura: Motion. Centrilobular emphysema. There is likely slight  interstitial prominence. There appear to be tiny nodules in the lingula. .  INCIDENTALLY IMAGED ABDOMEN:  Incidentally imaged portions of the abdomen appear unremarkable. .  MSK:   Small, fat-containing hernia in the medial, right hemidiaphragm. Kyphosis and  degenerative changes in the spine  . IMPRESSION  Limited exam  1. No visualized pulmonary embolus. 2. Centrilobular emphysema. 3. Possible interstitial prominence which could represent vascular congestion. 4. Query micronodularity in the lingula which may represent an atypical  infectious process such as mycobacterium. 5. Left thyroid nodule measuring up to 4.5 cm. Recommend nonemergent ultrasound  for further evaluation if not already performed at an outside facility. 6. Incidental findings as above. Patient has been admitted to hospitalist service. She is being treated for non-STEMI  ID service has been consulted to see patient. Patient seen today. Sitting up in chair. Cough less . D/w pulmonary, cardiology.      Active Hospital Problems    Diagnosis Date Noted    NSTEMI (non-ST elevated myocardial infarction) ([address] 10/17/2022         Past Medical History:   Diagnosis Date    Diabetes ([address]     Hypertension        Past Surgical History:   Procedure Laterality Date    COLONOSCOPY N/A 9/23/2020    COLONOSCOPY performed by Fabian Medina MD at [facility] ENDOSCOPY    COLONOSCOPY N/A 9/30/2022    COLONOSCOPY performed by Beata Melendez MD at [facility] ENDOSCOPY    COLONOSCOPY,ASHANTI AVINA,LEXI  9/23/2020         HX BREAST BIOPSY Left     40 yrs ago--neg    HX CATARACT REMOVAL Bilateral     HX HEENT      upper lid lift    HX ORTHOPAEDIC Bilateral     carpal tunnel release    HX OTHER SURGICAL      colonoscopy       Allergies   Allergen Reactions    Bactrim [Sulfamethoprim] Hives and Itching       Social Connections: Not on file       Family Status   Relation Name Status    Mother      Father      Sister  Alive    Brother  Alive    Brother  Alive    Brother  Alive    Sister  Alive       Medication Documentation Review Audit       Reviewed by Darrell Leonard RN (Registered Nurse) on 10/17/22 at       Medication Sig Documenting Provider Last Dose Status Taking?   aspirin 81 mg chewable tablet Take 81 mg by mouth daily. Mare Marx MD 10/17/2022 Active Yes   calcium-cholecalciferol, d3, (CALCIUM 600 + D) 600-125 mg-unit tab Take  by mouth daily. Provider, Historical 10/17/2022 Active Yes   DOCOSAHEXANOIC ACID/EPA (FISH OIL PO) Take 1 Cap by mouth two (2) times a day. Patient not taking: Reported on 10/17/2022    Mare Marx MD Not Taking Active No   furosemide (LASIX) 20 mg tablet Take 20 mg by mouth as needed. Provider, Historical 10/10/2022 Active Yes   glimepiride (AMARYL) 1 mg tablet Take 2 mg by mouth every morning. Mare Marx MD 10/17/2022 Active Yes   losartan (COZAAR) 50 mg tablet Take  by mouth daily. Mare Marx MD 10/17/2022 Active Yes   lovastatin (MEVACOR) 20 mg tablet Take 20 mg by mouth nightly. Mare Marx MD 10/16/2022 Active Yes   metFORMIN (GLUCOPHAGE) 1,000 mg tablet Take 1,000 mg by mouth two (2) times daily (with meals). Provider, Historical 10/17/2022 Active Yes                      Review of Systems - Negative except except those mentioned in H&P        PHYSICAL EXAM:  General:          Awake, cooperative, no acute distress    EENT:              EOMI. Anicteric sclerae. MMM  Resp:               CTA bilaterally, no wheezing or rales. No accessory muscle use  CV:                  Regular  rhythm,  No edema  GI:                   Soft, Non distended, Non tender.   +Bowel sounds  Neurologic:      Alert and oriented X 3, normal speech, Psych:             Good insight. Not anxious nor agitated  Skin:                No rashes. No jaundice.   Extremities: No edema    Amalia MD John Sutherland

## 2022-10-21 NOTE — PROGRESS NOTES
1500: Verbal report given to David AGEE [facility] nurse) by Chloe Felty RN (Lawrence Laura). Report included the following information SBAR, Kardex, Intake/Output, MAR, Recent Results, and Cardiac Rhythm NSR .     1550: patient received from cath lab. TR band patent, vitals stable, no chest pain, 02@ 2L. 1845: neb administered for wheezing, 02 removed and sats >95%    1930: [facility].

## 2022-10-21 NOTE — PROGRESS NOTES
Transition of Care Plan:     RUR: 7%  Disposition: Home  Follow up appointments: PCP, Specialist  DME needed: none  Transportation at Discharge: Family to provide transportation  [address] or means to access home: Family has access  IM Medicare Letter: 2nd IM Letter at d/c  Is patient a [place] and connected with the [place]? N/A             If yes, was [place] transfer form completed and [facility] notified? N/A  Caregiver Contact: Neftali Thomas- [phone]  Discharge Caregiver contacted prior to discharge? If pt desires  Care Conference needed?  n/a     CM reviewed pt chart. Pt not medially stable for d/c at this time. [employer] rep, informed CM that pt had a order for Lifevest. CM provided rep with need order, progress notes and test. Cath report is pending , pt scheduled for cath today. Final results pending.     CM will continue to follow pt for D/C planning and arrange services as deemed neccessary      [address]  Phone: [phone]

## 2022-10-22 LAB
BACTERIA SPEC CULT: ABNORMAL
GLUCOSE BLD STRIP.AUTO-MCNC: 174 MG/DL (ref 65–117)
GLUCOSE BLD STRIP.AUTO-MCNC: 188 MG/DL (ref 65–117)
GLUCOSE BLD STRIP.AUTO-MCNC: 188 MG/DL (ref 65–117)
GLUCOSE BLD STRIP.AUTO-MCNC: 231 MG/DL (ref 65–117)
GRAM STN SPEC: ABNORMAL
SERVICE CMNT-IMP: ABNORMAL

## 2022-10-22 PROCEDURE — 74011000250 HC RX REV CODE- 250: Performed by: INTERNAL MEDICINE

## 2022-10-22 PROCEDURE — 74011250637 HC RX REV CODE- 250/637: Performed by: NURSE PRACTITIONER

## 2022-10-22 PROCEDURE — 65270000046 HC RM TELEMETRY

## 2022-10-22 PROCEDURE — 82962 GLUCOSE BLOOD TEST: CPT

## 2022-10-22 PROCEDURE — 74011636637 HC RX REV CODE- 636/637: Performed by: STUDENT IN AN ORGANIZED HEALTH CARE EDUCATION/TRAINING PROGRAM

## 2022-10-22 PROCEDURE — 74011000258 HC RX REV CODE- 258: Performed by: INTERNAL MEDICINE

## 2022-10-22 PROCEDURE — 77010033678 HC OXYGEN DAILY

## 2022-10-22 PROCEDURE — 94640 AIRWAY INHALATION TREATMENT: CPT

## 2022-10-22 PROCEDURE — 74011250637 HC RX REV CODE- 250/637: Performed by: STUDENT IN AN ORGANIZED HEALTH CARE EDUCATION/TRAINING PROGRAM

## 2022-10-22 PROCEDURE — 74011250636 HC RX REV CODE- 250/636: Performed by: INTERNAL MEDICINE

## 2022-10-22 RX ORDER — ONDANSETRON 2 MG/ML
4 INJECTION INTRAMUSCULAR; INTRAVENOUS
Status: DISCONTINUED | OUTPATIENT
Start: 2022-10-22 | End: 2022-10-24 | Stop reason: HOSPADM

## 2022-10-22 RX ADMIN — ASPIRIN 81 MG CHEWABLE TABLET 81 MG: 81 TABLET CHEWABLE at 09:05

## 2022-10-22 RX ADMIN — CARVEDILOL 3.12 MG: 3.12 TABLET, FILM COATED ORAL at 16:48

## 2022-10-22 RX ADMIN — CEFEPIME 2 G: 2 INJECTION, POWDER, FOR SOLUTION INTRAVENOUS at 21:45

## 2022-10-22 RX ADMIN — Medication 2 UNITS: at 11:59

## 2022-10-22 RX ADMIN — CEFEPIME 2 G: 2 INJECTION, POWDER, FOR SOLUTION INTRAVENOUS at 13:01

## 2022-10-22 RX ADMIN — IPRATROPIUM BROMIDE AND ALBUTEROL SULFATE 3 ML: .5; 3 SOLUTION RESPIRATORY (INHALATION) at 13:25

## 2022-10-22 RX ADMIN — Medication 2 UNITS: at 07:49

## 2022-10-22 RX ADMIN — CEFEPIME 2 G: 2 INJECTION, POWDER, FOR SOLUTION INTRAVENOUS at 05:34

## 2022-10-22 RX ADMIN — IPRATROPIUM BROMIDE AND ALBUTEROL SULFATE 3 ML: .5; 3 SOLUTION RESPIRATORY (INHALATION) at 07:16

## 2022-10-22 RX ADMIN — AZITHROMYCIN MONOHYDRATE 500 MG: 500 INJECTION, POWDER, LYOPHILIZED, FOR SOLUTION INTRAVENOUS at 19:38

## 2022-10-22 RX ADMIN — CARVEDILOL 3.12 MG: 3.12 TABLET, FILM COATED ORAL at 09:05

## 2022-10-22 RX ADMIN — SACUBITRIL AND VALSARTAN 1 TABLET: 24; 26 TABLET, FILM COATED ORAL at 21:50

## 2022-10-22 RX ADMIN — ATORVASTATIN CALCIUM 10 MG: 10 TABLET, FILM COATED ORAL at 21:50

## 2022-10-22 RX ADMIN — Medication 2 UNITS: at 16:48

## 2022-10-22 RX ADMIN — Medication 2 UNITS: at 21:50

## 2022-10-22 RX ADMIN — SACUBITRIL AND VALSARTAN 1 TABLET: 24; 26 TABLET, FILM COATED ORAL at 09:05

## 2022-10-22 RX ADMIN — ONDANSETRON 4 MG: 2 INJECTION INTRAMUSCULAR; INTRAVENOUS at 19:17

## 2022-10-22 RX ADMIN — EMPAGLIFLOZIN 10 MG: 10 TABLET, FILM COATED ORAL at 09:05

## 2022-10-22 NOTE — PROGRESS NOTES
ADULT PROTOCOL: JET AEROSOL  REASSESSMENT    Patient  Dora Ravi     69 y.o.   female     10/22/2022  8:58 AM    Breath Sounds Pre Procedure: Right Breath Sounds: Diminished                               Left Breath Sounds: Diminished    Breath Sounds Post Procedure: Right Breath Sounds: Diminished                                 Left Breath Sounds: Diminished    Breathing pattern: Pre procedure Breathing Pattern: Regular          Post procedure Breathing Pattern: Regular    Heart Rate: Pre procedure Pulse: 73           Post procedure Pulse: 73    Resp Rate: Pre procedure Respirations: 18           Post procedure Respirations: 18    Incentive Spirometry:  Actual Volume (ml): 500 ml          Cough: Pre procedure Cough: Non-productive               Post procedure Cough: Non-productive    Oxygen: O2 Device: Nasal cannula      Changed: no    SpO2: Pre procedure SpO2: 95 %                 Post procedure SpO2: 95 %      Nebulizer Therapy: Current medications Aerosolized Medications: DuoNeb      Changed: no    Problem List:   Patient Active Problem List   Diagnosis Code    NSTEMI (non-ST elevated [facility] infarction) [facility] I21.4       Respiratory Therapist: Itzel Diego

## 2022-10-22 NOTE — PROGRESS NOTES
Spoke with [employer] representative who asked for procedure note on patient for life vest fitting. Copy faxed to Neha [phone] - fax failed multiple times. Sent directly to bill small e-mail.  Confirmed he received it

## 2022-10-23 LAB
APTT PPP: 20.8 SEC (ref 22.1–31)
GLUCOSE BLD STRIP.AUTO-MCNC: 163 MG/DL (ref 65–117)
GLUCOSE BLD STRIP.AUTO-MCNC: 169 MG/DL (ref 65–117)
GLUCOSE BLD STRIP.AUTO-MCNC: 178 MG/DL (ref 65–117)
SERVICE CMNT-IMP: ABNORMAL
THERAPEUTIC RANGE,PTTT: ABNORMAL SECS (ref 58–77)

## 2022-10-23 PROCEDURE — 74011250636 HC RX REV CODE- 250/636: Performed by: INTERNAL MEDICINE

## 2022-10-23 PROCEDURE — 74011000250 HC RX REV CODE- 250: Performed by: INTERNAL MEDICINE

## 2022-10-23 PROCEDURE — 74011250637 HC RX REV CODE- 250/637: Performed by: NURSE PRACTITIONER

## 2022-10-23 PROCEDURE — 94640 AIRWAY INHALATION TREATMENT: CPT

## 2022-10-23 PROCEDURE — 74011250637 HC RX REV CODE- 250/637: Performed by: STUDENT IN AN ORGANIZED HEALTH CARE EDUCATION/TRAINING PROGRAM

## 2022-10-23 PROCEDURE — 82962 GLUCOSE BLOOD TEST: CPT

## 2022-10-23 PROCEDURE — 65270000046 HC RM TELEMETRY

## 2022-10-23 PROCEDURE — 74011000258 HC RX REV CODE- 258: Performed by: INTERNAL MEDICINE

## 2022-10-23 PROCEDURE — 77010033678 HC OXYGEN DAILY

## 2022-10-23 PROCEDURE — 36415 COLL VENOUS BLD VENIPUNCTURE: CPT

## 2022-10-23 PROCEDURE — 85730 THROMBOPLASTIN TIME PARTIAL: CPT

## 2022-10-23 RX ORDER — ACETAMINOPHEN 325 MG/1
650 TABLET ORAL
Status: DISCONTINUED | OUTPATIENT
Start: 2022-10-23 | End: 2022-10-24 | Stop reason: HOSPADM

## 2022-10-23 RX ORDER — ENOXAPARIN SODIUM 100 MG/ML
30 INJECTION SUBCUTANEOUS EVERY 12 HOURS
Status: DISCONTINUED | OUTPATIENT
Start: 2022-10-23 | End: 2022-10-24 | Stop reason: HOSPADM

## 2022-10-23 RX ADMIN — AZITHROMYCIN MONOHYDRATE 500 MG: 500 INJECTION, POWDER, LYOPHILIZED, FOR SOLUTION INTRAVENOUS at 21:22

## 2022-10-23 RX ADMIN — ENOXAPARIN SODIUM 30 MG: 100 INJECTION SUBCUTANEOUS at 22:54

## 2022-10-23 RX ADMIN — CEFEPIME 2 G: 2 INJECTION, POWDER, FOR SOLUTION INTRAVENOUS at 13:05

## 2022-10-23 RX ADMIN — ACETAMINOPHEN 650 MG: 325 TABLET ORAL at 22:11

## 2022-10-23 RX ADMIN — ASPIRIN 81 MG CHEWABLE TABLET 81 MG: 81 TABLET CHEWABLE at 09:39

## 2022-10-23 RX ADMIN — CEFEPIME 2 G: 2 INJECTION, POWDER, FOR SOLUTION INTRAVENOUS at 05:29

## 2022-10-23 RX ADMIN — IPRATROPIUM BROMIDE AND ALBUTEROL SULFATE 3 ML: .5; 3 SOLUTION RESPIRATORY (INHALATION) at 10:13

## 2022-10-23 RX ADMIN — CARVEDILOL 3.12 MG: 3.12 TABLET, FILM COATED ORAL at 17:50

## 2022-10-23 RX ADMIN — CARVEDILOL 3.12 MG: 3.12 TABLET, FILM COATED ORAL at 09:39

## 2022-10-23 RX ADMIN — SACUBITRIL AND VALSARTAN 1 TABLET: 24; 26 TABLET, FILM COATED ORAL at 22:11

## 2022-10-23 RX ADMIN — CEFEPIME 2 G: 2 INJECTION, POWDER, FOR SOLUTION INTRAVENOUS at 22:47

## 2022-10-23 RX ADMIN — SACUBITRIL AND VALSARTAN 1 TABLET: 24; 26 TABLET, FILM COATED ORAL at 09:39

## 2022-10-23 RX ADMIN — IPRATROPIUM BROMIDE AND ALBUTEROL SULFATE 3 ML: .5; 3 SOLUTION RESPIRATORY (INHALATION) at 19:21

## 2022-10-23 RX ADMIN — EMPAGLIFLOZIN 10 MG: 10 TABLET, FILM COATED ORAL at 09:40

## 2022-10-23 RX ADMIN — IPRATROPIUM BROMIDE AND ALBUTEROL SULFATE 3 ML: .5; 3 SOLUTION RESPIRATORY (INHALATION) at 13:45

## 2022-10-23 RX ADMIN — ATORVASTATIN CALCIUM 10 MG: 10 TABLET, FILM COATED ORAL at 22:11

## 2022-10-23 NOTE — PROGRESS NOTES
Problem: Falls - Risk of  Goal: *Absence of Falls  Description: Document Jocelin Ny Fall Risk and appropriate interventions in the flowsheet. Outcome: Progressing Towards Goal  Note: Fall Risk Interventions:  Mobility Interventions: Bed/chair exit alarm, Communicate number of staff needed for ambulation/transfer, Patient to call before getting OOB, Strengthening exercises (ROM-active/passive)         Medication Interventions: Patient to call before getting OOB, Evaluate medications/consider consulting pharmacy, Teach patient to arise slowly, Utilize gait belt for transfers/ambulation    Elimination Interventions: Call light in reach, Patient to call for help with toileting needs, Toileting schedule/hourly rounds              Problem: Hypertension  Goal: *Blood pressure within specified parameters  Outcome: Progressing Towards Goal  Goal: *Fluid volume balance  Outcome: Progressing Towards Goal  Goal: *Labs within defined limits  Outcome: Progressing Towards Goal     Problem: Diabetes Self-Management  Goal: *Disease process and treatment process  Description: Define diabetes and identify own type of diabetes; list 3 options for treating diabetes.   Outcome: Progressing Towards Goal     Problem: Patient Education:  Go to Education Activity  Goal: Patient/Family Education  Outcome: Progressing Towards Goal

## 2022-10-23 NOTE — PROGRESS NOTES
Occupational Therapy    Duplicate orders received and acknowledged. Acute OT to follow x2/weekly to address energy conservation and PLB technique, with no anticipated future OT needs once discharged. Will continue to follow. Thank you.     Carolina Blake, OTD, OTR/L

## 2022-10-23 NOTE — PROGRESS NOTES
Hospitalist Progress Note    NAME: Ajith Sullivan   :  1945   MRN:  [id number]       Assessment / Plan:    NSTEMI  - s/p LHC 10/21 normal coronaries  - on RA  - Morphine PNR  - Nitrtes  - lipitor, cozaar, ASA, coreg    Sepsis leukocytosis 15.9 and tachycardia 130  ? Acute PNA, possibly 2/2 Mycobacterium  - Quantiferon gold and AFB pending  - cont. cefepime and azithro for 7 days  - no indication for bronch at this time  - ID consult appreciate recs  -02 challenge test and case mgmt consult for home o2    New systolic CHF  -Cont coreg, entresto and jardiance  -life vest to be arranged today      DM  - hold home Metformin and glipizide  - RISS - normal     Centrilobular Emphysema  - Duoneb; needs outpt PFT    40 or above Morbid obesity / Body mass index is 42.37 kg/m². Estimated discharge date:   Barriers: s/p cath dc in am    Code status: Full  Prophylaxis:  heparin gtt  Recommended Disposition: Home w/Family     Subjective:     Chief Complaint / Reason for Physician Visit  Sob is better, mild cough, no phlegm  No fever        Objective:     VITALS:   Last 24hrs VS reviewed since prior progress note.  Most recent are:  Patient Vitals for the past 24 hrs:   Temp Pulse Resp BP SpO2   10/22/22 2153 -- 74 21 (!) 113/57 94 %   10/22/22 2150 -- 74 -- -- --   10/1945 98.1 °F (36.7 °C) 77 22 (!) 142/67 94 %   10/22/22 1921 -- 79 16 (!) 165/69 (!) 88 %   10/22/22 1914 -- -- -- -- 94 %   10/22/22 1510 97.3 °F (36.3 °C) 75 20 136/64 93 %   10/22/22 1325 -- -- -- -- 93 %   10/22/22 1157 98.1 °F (36.7 °C) 74 19 124/74 94 %   10/22/22 0745 97.7 °F (36.5 °C) 73 19 (!) 140/74 93 %   10/22/22 0717 -- -- -- -- 95 %   10/22/22 0400 97.8 °F (36.6 °C) 74 17 (!) 129/57 96 %   10/21/22 2300 97.8 °F (36.6 °C) 81 22 105/67 94 %         Intake/Output Summary (Last 24 hours) at 10/22/2022 2225  Last data filed at 10/22/2022 1114  Gross per 24 hour   Intake 100 ml   Output 1825 ml   Net -1725 ml          I had a face to face encounter and independently examined this patient on 10/22/2022, as outlined below:  PHYSICAL EXAM:  General:  Alert, obese no acute distress    Resp:  Bibasilar rales,  No accessory muscle use  CV:  Regular  rhythm,  No edema  GI:  Soft, Non distended, Non tender. +Bowel sounds  Neurologic:  Alert and oriented X 3, normal speech,   Psych:   Not anxious nor agitated  Skin:  No rashes. No jaundice    Reviewed most current lab test results and cultures  YES  Reviewed most current radiology test results   YES  Review and summation of old records today    NO  Reviewed patient's current orders and MAR    YES  PMH/SH reviewed - no change compared to H&P  ________________________________________________________________________  Care Plan discussed with:    Comments   Patient x    Family      RN x    Care Manager     Consultant                       x Multidiciplinary team rounds were held today with , nursing, pharmacist and clinical coordinator. Patient's plan of care was discussed; medications were reviewed and discharge planning was addressed. ________________________________________________________________________  Total NON critical care TIME:  40   Minutes    Total CRITICAL CARE TIME Spent:   Minutes non procedure based      Comments   >50% of visit spent in counseling and coordination of care x    ________________________________________________________________________  Laurie Vallejo MD     Procedures: see electronic medical records for all procedures/Xrays and details which were not copied into this note but were reviewed prior to creation of Plan. LABS:  I reviewed today's most current labs and imaging studies. Pertinent labs include:  Recent Labs     10/21/22  0006   WBC 9.3   HGB 8.5*   HCT 28.5*          No results for input(s): NA, K, CL, CO2, GLU, BUN, CREA, CA, MG, PHOS, ALB, TBIL, TBILI, ALT, INR, INREXT, INREXT in the last 72 hours.     No lab exists for component: SGOT      Signed: Neymar Hoyos MD

## 2022-10-23 NOTE — PROGRESS NOTES
Physical Therapy  Referral received, chart reviewed and noted patient was screened and discharged from PT 10/18. Per chart review, appears no decline in mobility since that time and is being followed by OT. Will defer to OT for therapy (energy conservation) needs and will again sign off.    Precious Hopper, PT, DPT

## 2022-10-24 ENCOUNTER — TRANSCRIBE ORDER (OUTPATIENT)
Dept: CARDIAC REHAB | Age: 77
End: 2022-10-24

## 2022-10-24 VITALS
OXYGEN SATURATION: 94 % | BODY MASS INDEX: 43.05 KG/M2 | SYSTOLIC BLOOD PRESSURE: 141 MMHG | WEIGHT: 243 LBS | TEMPERATURE: 97.6 F | HEART RATE: 81 BPM | HEIGHT: 63 IN | RESPIRATION RATE: 18 BRPM | DIASTOLIC BLOOD PRESSURE: 79 MMHG

## 2022-10-24 DIAGNOSIS — I50.9 HEART FAILURE, UNSPECIFIED HF CHRONICITY, UNSPECIFIED HEART FAILURE TYPE (HCC): Primary | ICD-10-CM

## 2022-10-24 LAB
GLUCOSE BLD STRIP.AUTO-MCNC: 120 MG/DL (ref 65–117)
GLUCOSE BLD STRIP.AUTO-MCNC: 128 MG/DL (ref 65–117)
GLUCOSE BLD STRIP.AUTO-MCNC: 158 MG/DL (ref 65–117)
SERVICE CMNT-IMP: ABNORMAL

## 2022-10-24 PROCEDURE — 74011636637 HC RX REV CODE- 636/637: Performed by: STUDENT IN AN ORGANIZED HEALTH CARE EDUCATION/TRAINING PROGRAM

## 2022-10-24 PROCEDURE — 74011250636 HC RX REV CODE- 250/636: Performed by: INTERNAL MEDICINE

## 2022-10-24 PROCEDURE — 74011250637 HC RX REV CODE- 250/637: Performed by: STUDENT IN AN ORGANIZED HEALTH CARE EDUCATION/TRAINING PROGRAM

## 2022-10-24 PROCEDURE — 82962 GLUCOSE BLOOD TEST: CPT

## 2022-10-24 PROCEDURE — 74011000250 HC RX REV CODE- 250: Performed by: INTERNAL MEDICINE

## 2022-10-24 PROCEDURE — 94640 AIRWAY INHALATION TREATMENT: CPT

## 2022-10-24 PROCEDURE — 74011250637 HC RX REV CODE- 250/637: Performed by: NURSE PRACTITIONER

## 2022-10-24 PROCEDURE — 74011000258 HC RX REV CODE- 258: Performed by: INTERNAL MEDICINE

## 2022-10-24 RX ORDER — IPRATROPIUM BROMIDE AND ALBUTEROL SULFATE 2.5; .5 MG/3ML; MG/3ML
3 SOLUTION RESPIRATORY (INHALATION)
Status: DISCONTINUED | OUTPATIENT
Start: 2022-10-24 | End: 2022-10-24 | Stop reason: HOSPADM

## 2022-10-24 RX ORDER — CARVEDILOL 3.12 MG/1
3.12 TABLET ORAL 2 TIMES DAILY WITH MEALS
Qty: 60 TABLET | Refills: 1 | Status: SHIPPED | OUTPATIENT
Start: 2022-10-24

## 2022-10-24 RX ADMIN — SACUBITRIL AND VALSARTAN 1 TABLET: 24; 26 TABLET, FILM COATED ORAL at 08:38

## 2022-10-24 RX ADMIN — ENOXAPARIN SODIUM 30 MG: 100 INJECTION SUBCUTANEOUS at 11:39

## 2022-10-24 RX ADMIN — EMPAGLIFLOZIN 10 MG: 10 TABLET, FILM COATED ORAL at 09:04

## 2022-10-24 RX ADMIN — CEFEPIME 2 G: 2 INJECTION, POWDER, FOR SOLUTION INTRAVENOUS at 13:01

## 2022-10-24 RX ADMIN — CARVEDILOL 3.12 MG: 3.12 TABLET, FILM COATED ORAL at 17:08

## 2022-10-24 RX ADMIN — ASPIRIN 81 MG CHEWABLE TABLET 81 MG: 81 TABLET CHEWABLE at 08:38

## 2022-10-24 RX ADMIN — IPRATROPIUM BROMIDE AND ALBUTEROL SULFATE 3 ML: .5; 3 SOLUTION RESPIRATORY (INHALATION) at 08:18

## 2022-10-24 RX ADMIN — CEFEPIME 2 G: 2 INJECTION, POWDER, FOR SOLUTION INTRAVENOUS at 06:15

## 2022-10-24 RX ADMIN — Medication 2 UNITS: at 11:38

## 2022-10-24 NOTE — CARDIO/PULMONARY
Cardiac Rehab Note: chart review/referral     CHF BUNDLE     EF 25%  on 10/20/22 per echo. Smoking history assessed. Patient is a former smoker. \"Living with Heart Failure\" book with daily weight calendar and s/s of heart failure magnet provided to Jennifer Hunter on 10/24/22. Educated using teach back method. Instruction given on s/s of CHF, checking weight every am and calling MD if weight is up 2-3 lbs in a day or 5 lbs in a week (or as directed by the physician), fluid/Na restrictions, s/s of worsening CHF and when to call MD.     Reviewed activity as tolerated with frequent rest periods as needed, taking medications as prescribed, and the importance of follow up visits with physician. Cardiac rehab intake appointment was scheduled for 12/15/2022. Sarah Cabral verbalized understanding with questions answered.

## 2022-10-24 NOTE — PROGRESS NOTES
P&T-Approved DVT Prophylaxis Dosing    Per P&T Committee-approved protocol lovenox 40 mg daily has been adjusted to lovenox 30 mg bid based on weight and renal function as shown in the table below.          MORENO PickensD

## 2022-10-24 NOTE — PROGRESS NOTES
Problem: Falls - Risk of  Goal: *Absence of Falls  Description: Document Kelly Coe Fall Risk and appropriate interventions in the flowsheet. Outcome: Resolved/Met  Note: Fall Risk Interventions:  Mobility Interventions: Assess mobility with egress test    Mentation Interventions: Bed/chair exit alarm    Medication Interventions: Bed/chair exit alarm    Elimination Interventions: Bed/chair exit alarm              Problem: Patient Education: Go to Patient Education Activity  Goal: Patient/Family Education  Outcome: Resolved/Met     Problem: Hypertension  Goal: *Blood pressure within specified parameters  Outcome: Resolved/Met  Goal: *Fluid volume balance  Outcome: Resolved/Met  Goal: *Labs within defined limits  Outcome: Resolved/Met     Problem: Patient Education: Go to Patient Education Activity  Goal: Patient/Family Education  Outcome: Resolved/Met     Problem: Diabetes Self-Management  Goal: *Disease process and treatment process  Description: Define diabetes and identify own type of diabetes; list 3 options for treating diabetes. Outcome: Resolved/Met  Goal: *Incorporating nutritional management into lifestyle  Description: Describe effect of type, amount and timing of food on blood glucose; list 3 methods for planning meals. Outcome: Resolved/Met  Goal: *Incorporating physical activity into lifestyle  Description: State effect of exercise on blood glucose levels. Outcome: Resolved/Met  Goal: *Developing strategies to promote health/change behavior  Description: Define the ABC's of diabetes; identify appropriate screenings, schedule and personal plan for screenings. Outcome: Resolved/Met  Goal: *Using medications safely  Description: State effect of diabetes medications on diabetes; name diabetes medication taking, action and side effects.   Outcome: Resolved/Met  Goal: *Monitoring blood glucose, interpreting and using results  Description: Identify recommended blood glucose targets  and personal targets. Outcome: Resolved/Met  Goal: *Prevention, detection, treatment of acute complications  Description: List symptoms of hyper- and hypoglycemia; describe how to treat low blood sugar and actions for lowering  high blood glucose level. Outcome: Resolved/Met  Goal: *Prevention, detection and treatment of chronic complications  Description: Define the natural course of diabetes and describe the relationship of blood glucose levels to long term complications of diabetes.   Outcome: Resolved/Met  Goal: *Developing strategies to address psychosocial issues  Description: Describe feelings about living with diabetes; identify support needed and support network  Outcome: Resolved/Met  Goal: *Insulin pump training  Outcome: Resolved/Met  Goal: *Sick day guidelines  Outcome: Resolved/Met  Goal: *Patient Specific Goal (EDIT GOAL, INSERT TEXT)  Outcome: Resolved/Met     Problem: Patient Education: Go to Patient Education Activity  Goal: Patient/Family Education  Outcome: Resolved/Met     Problem: Patient Education: Go to Patient Education Activity  Goal: Patient/Family Education  Outcome: Resolved/Met     Problem: Unstable angina/NSTEMI: Day of Admission/Day 1  Goal: Off Pathway (Use only if patient is Off Pathway)  Outcome: Resolved/Met  Goal: Activity/Safety  Outcome: Resolved/Met  Goal: Consults, if ordered  Outcome: Resolved/Met  Goal: Diagnostic Test/Procedures  Outcome: Resolved/Met  Goal: Nutrition/Diet  Outcome: Resolved/Met  Goal: Discharge Planning  Outcome: Resolved/Met  Goal: Medications  Outcome: Resolved/Met  Goal: Respiratory  Outcome: Resolved/Met  Goal: Treatments/Interventions/Procedures  Outcome: Resolved/Met  Goal: Psychosocial  Outcome: Resolved/Met  Goal: *Hemodynamically stable  Outcome: Resolved/Met  Goal: *Optimal pain control at patient's stated goal  Outcome: Resolved/Met  Goal: *Lungs clear or at baseline  Outcome: Resolved/Met     Problem: Unstable angina/NSTEMI: Day 2  Goal: Off Pathway (Use only if patient is Off Pathway)  Outcome: Resolved/Met  Goal: Activity/Safety  Outcome: Resolved/Met  Goal: Consults, if ordered  Outcome: Resolved/Met  Goal: Diagnostic Test/Procedures  Outcome: Resolved/Met  Goal: Nutrition/Diet  Outcome: Resolved/Met  Goal: Discharge Planning  Outcome: Resolved/Met  Goal: Medications  Outcome: Resolved/Met  Goal: Respiratory  Outcome: Resolved/Met  Goal: Treatments/Interventions/Procedures  Outcome: Resolved/Met  Goal: Psychosocial  Outcome: Resolved/Met  Goal: *Hemodynamically stable  Outcome: Resolved/Met  Goal: *Optimal pain control at patient's stated goal  Outcome: Resolved/Met  Goal: *Lungs clear or at baseline  Outcome: Resolved/Met     Problem: Unstable Angina/NSTEMI: Discharge Outcomes  Goal: *Hemodynamically stable  Outcome: Resolved/Met  Goal: *Stable cardiac rhythm  Outcome: Resolved/Met  Goal: *Lungs clear or at baseline  Outcome: Resolved/Met  Goal: *Optimal pain control at patient's stated goal  Outcome: Resolved/Met  Goal: *Identifies cardiac risk factors  Outcome: Resolved/Met  Goal: *Verbalizes home exercise program, activity guidelines, cardiac precautions  Outcome: Resolved/Met  Goal: *Verbalizes understanding and describes prescribed diet  Outcome: Resolved/Met  Goal: *Verbalizes name, dosage, time, side effects, and number of days to continue medications  Outcome: Resolved/Met  Goal: *Anxiety reduced or absent  Outcome: Resolved/Met  Goal: *Understands and describes signs and symptoms to report to providers(Stroke Metric)  Outcome: Resolved/Met  Goal: *Describes follow-up/return visits to physicians  Outcome: Resolved/Met  Goal: *Describes available resources and support systems  Outcome: Resolved/Met  Goal: *Influenza immunization  Outcome: Resolved/Met  Goal: *Pneumococcal immunization  Outcome: Resolved/Met  Goal: *Describes smoking cessation resources  Outcome: Resolved/Met     Problem: Risk for Spread of Infection  Goal: Prevent transmission of infectious organism to others  Description: Prevent the transmission of infectious organisms to other patients, staff members, and visitors. Outcome: Resolved/Met     Problem: Patient Education:  Go to Education Activity  Goal: Patient/Family Education  Outcome: Resolved/Met     Problem: Patient Education: Go to Patient Education Activity  Goal: Patient/Family Education  Outcome: Resolved/Met     Problem: Pressure Injury - Risk of  Goal: *Prevention of pressure injury  Description: Document Neil Scale and appropriate interventions in the flowsheet.   Outcome: Resolved/Met  Note: Pressure Injury Interventions:  Sensory Interventions: Assess need for specialty bed    Moisture Interventions: Apply protective barrier, creams and emollients    Activity Interventions: Increase time out of bed    Mobility Interventions: Assess need for specialty bed    Nutrition Interventions: Document food/fluid/supplement intake                     Problem: Patient Education: Go to Patient Education Activity  Goal: Patient/Family Education  Outcome: Resolved/Met

## 2022-10-24 NOTE — PROGRESS NOTES
ADULT PROTOCOL: JET AEROSOL ASSESSMENT    Patient  Valentin Pain     69 y.o.   female     10/24/2022  8:29 AM    Breath Sounds Pre Procedure: Right Breath Sounds: Diminished                               Left Breath Sounds: Diminished    Breath Sounds Post Procedure: Right Breath Sounds: Diminished                                 Left Breath Sounds: Diminished    Breathing pattern: Pre procedure Breathing Pattern: Regular          Post procedure Breathing Pattern: Regular    Heart Rate: Pre procedure Pulse: 76           Post procedure Pulse: 81    Resp Rate: Pre procedure Respirations: 16           Post procedure Respirations: 16    Cough: Pre procedure Cough: Non-productive               Post procedure Cough: Non-productive      Oxygen: O2 Device: None (Room air)        Changed: NO    SpO2: Pre procedure SpO2: 95 %   without oxygen              Post procedure SpO2: 93 %  without oxygen    Nebulizer Therapy: Current medications Aerosolized Medications: DuoNeb      Changed: YES    Smoking History:   Social History     Tobacco Use   Smoking Status Former    Packs/day: 1.00    Years: 25.00    Pack years: 25.00    Types: Cigarettes    Quit date: 1999    Years since quittin.1   Smokeless Tobacco Never       Problem List:   Patient Active Problem List   Diagnosis Code    [facility]STEM[facility] (non-ST elevated myocardial infarction) ([facility] I21.4       Respiratory Therapist: Gunnar Booker RT

## 2022-10-24 NOTE — PROGRESS NOTES
Problem: Falls - Risk of  Goal: *Absence of Falls  Description: Document Momo Mathews Fall Risk and appropriate interventions in the flowsheet. Outcome: Resolved/Met  Note: Fall Risk Interventions:  Mobility Interventions: Assess mobility with egress test    Mentation Interventions: Bed/chair exit alarm    Medication Interventions: Bed/chair exit alarm    Elimination Interventions: Bed/chair exit alarm              Problem: Patient Education: Go to Patient Education Activity  Goal: Patient/Family Education  Outcome: Resolved/Met     Problem: Hypertension  Goal: *Blood pressure within specified parameters  Outcome: Resolved/Met  Goal: *Fluid volume balance  Outcome: Resolved/Met  Goal: *Labs within defined limits  Outcome: Resolved/Met     Problem: Patient Education: Go to Patient Education Activity  Goal: Patient/Family Education  Outcome: Resolved/Met     Problem: Diabetes Self-Management  Goal: *Disease process and treatment process  Description: Define diabetes and identify own type of diabetes; list 3 options for treating diabetes. Outcome: Resolved/Met  Goal: *Incorporating nutritional management into lifestyle  Description: Describe effect of type, amount and timing of food on blood glucose; list 3 methods for planning meals. Outcome: Resolved/Met  Goal: *Incorporating physical activity into lifestyle  Description: State effect of exercise on blood glucose levels. Outcome: Resolved/Met  Goal: *Developing strategies to promote health/change behavior  Description: Define the ABC's of diabetes; identify appropriate screenings, schedule and personal plan for screenings. Outcome: Resolved/Met  Goal: *Using medications safely  Description: State effect of diabetes medications on diabetes; name diabetes medication taking, action and side effects.   Outcome: Resolved/Met  Goal: *Monitoring blood glucose, interpreting and using results  Description: Identify recommended blood glucose targets  and personal targets. Outcome: Resolved/Met  Goal: *Prevention, detection, treatment of acute complications  Description: List symptoms of hyper- and hypoglycemia; describe how to treat low blood sugar and actions for lowering  high blood glucose level. Outcome: Resolved/Met  Goal: *Prevention, detection and treatment of chronic complications  Description: Define the natural course of diabetes and describe the relationship of blood glucose levels to long term complications of diabetes.   Outcome: Resolved/Met  Goal: *Developing strategies to address psychosocial issues  Description: Describe feelings about living with diabetes; identify support needed and support network  Outcome: Resolved/Met  Goal: *Insulin pump training  Outcome: Resolved/Met  Goal: *Sick day guidelines  Outcome: Resolved/Met  Goal: *Patient Specific Goal (EDIT GOAL, INSERT TEXT)  Outcome: Resolved/Met     Problem: Patient Education: Go to Patient Education Activity  Goal: Patient/Family Education  Outcome: Resolved/Met     Problem: Patient Education: Go to Patient Education Activity  Goal: Patient/Family Education  Outcome: Resolved/Met     Problem: Unstable angina/NSTEMI: Day of Admission/Day 1  Goal: Off Pathway (Use only if patient is Off Pathway)  Outcome: Resolved/Met  Goal: Activity/Safety  Outcome: Resolved/Met  Goal: Consults, if ordered  Outcome: Resolved/Met  Goal: Diagnostic Test/Procedures  Outcome: Resolved/Met  Goal: Nutrition/Diet  Outcome: Resolved/Met  Goal: Discharge Planning  Outcome: Resolved/Met  Goal: Medications  Outcome: Resolved/Met  Goal: Respiratory  Outcome: Resolved/Met  Goal: Treatments/Interventions/Procedures  Outcome: Resolved/Met  Goal: Psychosocial  Outcome: Resolved/Met  Goal: *Hemodynamically stable  Outcome: Resolved/Met  Goal: *Optimal pain control at patient's stated goal  Outcome: Resolved/Met  Goal: *Lungs clear or at baseline  Outcome: Resolved/Met     Problem: Unstable angina/NSTEMI: Day 2  Goal: Off Pathway (Use only if patient is Off Pathway)  Outcome: Resolved/Met  Goal: Activity/Safety  Outcome: Resolved/Met  Goal: Consults, if ordered  Outcome: Resolved/Met  Goal: Diagnostic Test/Procedures  Outcome: Resolved/Met  Goal: Nutrition/Diet  Outcome: Resolved/Met  Goal: Discharge Planning  Outcome: Resolved/Met  Goal: Medications  Outcome: Resolved/Met  Goal: Respiratory  Outcome: Resolved/Met  Goal: Treatments/Interventions/Procedures  Outcome: Resolved/Met  Goal: Psychosocial  Outcome: Resolved/Met  Goal: *Hemodynamically stable  Outcome: Resolved/Met  Goal: *Optimal pain control at patient's stated goal  Outcome: Resolved/Met  Goal: *Lungs clear or at baseline  Outcome: Resolved/Met     Problem: Unstable Angina/NSTEMI: Discharge Outcomes  Goal: *Hemodynamically stable  Outcome: Resolved/Met  Goal: *Stable cardiac rhythm  Outcome: Resolved/Met  Goal: *Lungs clear or at baseline  Outcome: Resolved/Met  Goal: *Optimal pain control at patient's stated goal  Outcome: Resolved/Met  Goal: *Identifies cardiac risk factors  Outcome: Resolved/Met  Goal: *Verbalizes home exercise program, activity guidelines, cardiac precautions  Outcome: Resolved/Met  Goal: *Verbalizes understanding and describes prescribed diet  Outcome: Resolved/Met  Goal: *Verbalizes name, dosage, time, side effects, and number of days to continue medications  Outcome: Resolved/Met  Goal: *Anxiety reduced or absent  Outcome: Resolved/Met  Goal: *Understands and describes signs and symptoms to report to providers(Stroke Metric)  Outcome: Resolved/Met  Goal: *Describes follow-up/return visits to physicians  Outcome: Resolved/Met  Goal: *Describes available resources and support systems  Outcome: Resolved/Met  Goal: *Influenza immunization  Outcome: Resolved/Met  Goal: *Pneumococcal immunization  Outcome: Resolved/Met  Goal: *Describes smoking cessation resources  Outcome: Resolved/Met     Problem: Risk for Spread of Infection  Goal: Prevent transmission of infectious organism to others  Description: Prevent the transmission of infectious organisms to other patients, staff members, and visitors.   Outcome: Resolved/Met     Problem: Patient Education:  Go to Education Activity  Goal: Patient/Family Education  Outcome: Resolved/Met

## 2022-10-24 NOTE — DISCHARGE INSTRUCTIONS
Patient Discharge Instructions    Homero Singh / [id number] : 1945    Admitted 10/17/2022 Discharged: 10/24/2022         DISCHARGE DIAGNOSIS:   NSTEMI s/p cath showing no focal cad  Sepsis leukocytosis 15.9 and tachycardia 130  Acute PNA,  New systolic CHF  DM  Centrilobular Emphysema           Take Home Medications     {Medication reconciliation information is now added to the patient's AVS automatically when it is printed. There is no need to use this SmartLink in discharge instructions. Highlight this text and delete it to clear this message}      General drug facts     If you have a very bad allergy, wear an allergy ID at all times. It is important that you take the medication exactly as they are prescribed. Keep your medication in the bottles provided by the pharmacist.  Keep a list of all your drugs (prescription, natural products, vitamins, OTC) with you. Give this list to your doctor. Do not take other medications without consulting your doctor. Do not share your drugs with others and do not take anyone else's drugs. Keep all drugs out of the reach of children and pets. Most drugs may be thrown away in household trash after mixing with coffee grounds or joann litter and sealing in a plastic bag. Keep a list Call your doctor for help with any side effects. If in the [place] you may also call the FDA at 9-606-FDA-4378    Talk with the doctor before starting any new drug, including OTC, natural products, or vitamins. What to do at Home    1. Recommended diet: diabetic     2. Recommended activity: Activity as tolerated    3. If you experience any of the following symptoms then please call your primary care physician or return to the emergency room if you cannot get hold of your doctor:    4. Wound Care: none     5. Lab work: cbc and cmp in 1 week     6. Check your blood pressure daily and take it to PCP    7. Bring these papers with you to your follow up appointments.  The papers will help your doctors be sure to continue the care plan from the hospital.    8. Follow up with your infection and lung doctor for pending tests. If you have questions regarding the hospital related prescriptions or hospital related issues please call [facility] at [phone]. You can always direct your questions to your primary care doctor if you are unable to reach your hospital physician; your PCP works as an extension of your hospital doctor just like your hospital doctor is an extension of your PCP for your time at the hospital [facility], [facility])      Follow-up with:   PCP: @PCP@  Musa Ray MD  [address]  [phone]          [place], [address] [place] [address]  [phone]    Schedule an appointment as soon as possible for a visit in 1 week(s)      Greg Hassan MD  [address]  [place] Zam[place]  [phone]    Schedule an appointment as soon as possible for a visit in 1 week(s)      Nicolle Larson MD  [address]  [address]  [address] [phone]    Schedule an appointment as soon as possible for a visit in 1 week(s)         Please call for your own appointment        Information obtained by :  I understand that if any problems occur once I am at home I am to contact my physician. I understand and acknowledge receipt of the instructions indicated above.                                                                                                                                            Physician's or R.N.'s Signature                                                                  Date/Time                                                                                                                                              Patient or Representative Signature                                                          Date/Time

## 2022-10-24 NOTE — DISCHARGE SUMMARY
Hospitalist Discharge Summary     Patient ID:  Domenic Newby  [id number]  69 y.o.  1945  10/17/2022    PCP on record: Cony Oshea MD    Admit date: 10/17/2022  Discharge date and time: 10/24/2022    DISCHARGE DIAGNOSIS:    NSTEMI s/p cath showing no focal cad  Sepsis leukocytosis 15.9 and tachycardia 130  Acute PNA,  New systolic CHF  DM  Centrilobular Emphysema      CONSULTATIONS:  IP CONSULT TO HOSPITALIST  IP CONSULT TO CARDIOLOGY  IP CONSULT TO INFECTIOUS DISEASES  IP CONSULT TO PULMONOLOGY    Excerpted HPI from H&P of Mitzy Charlton MD:  69 y.o. female with pertinent medical hx of morbid obesity, diabetes presented with chest pain with shortness of breath    ______________________________________________________________________  DISCHARGE SUMMARY/HOSPITAL COURSE:  for full details see H&P, daily progress notes, labs, consult notes. Hospital course problem is:    Patient was admitted hospital and underwent management as follows. Acute NSTEMI  - s/p [facility] 10/21 normal coronaries. Continue aspirin, Coreg, Lipitor and Cozaar. Patient was cleared by cardiology for discharge for outpatient follow-up. Sepsis   ? 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 S/p to cefepime and Zithromax for a total of 7 days. Patient was seen by ID and also pulmonary during hospitalization. Pulmonary did not recommend bronchoscopy at this time. With above treatment, patient showed good improvement in her symptoms. She was able to be weaned off to room air. She was cleared by pulmonary and ID to be discharged for outpatient follow-up in their clinic in about 1 week's time for discussion of pending results and also further follow-up. New systolic CHF  -Cont coreg, entresto and jardiance. LifeVest is arranged. She was able to be weaned off to room air. She will follow with cardiology on outpatient basis.        DM  -Continue home metformin and glipizide as below. Centrilobular Emphysema  - Duoneb; needs outpt PFT. Will follow with pulmonary on outpatient basis. Patient seen and examined today, vital signs are stable, lab works at baseline was cleared by all consulting physicians to be discharged for outpatient follow-up.        _______________________________________________________________________  Patient seen and examined by me on discharge day. Pertinent Findings:  Gen:    Not in distress  Chest: Clear lungs  CVS:   Regular rhythm. No edema  Abd:  Soft, not distended, not tender  Neuro:  Alert, awake  _______________________________________________________________________  DISCHARGE MEDICATIONS:   Current Discharge Medication List        START taking these medications    Details   carvediloL (COREG) 3.125 mg tablet Take 1 Tablet by mouth two (2) times daily (with meals). Qty: 60 Tablet, Refills: 1  Start date: 10/24/2022      empagliflozin (JARDIANCE) 10 mg tablet Take 1 Tablet by mouth daily. Qty: 30 Tablet, Refills: 1  Start date: 10/25/2022      sacubitril-valsartan (ENTRESTO) 24 mg/26 mg tablet Take 1 Tablet by mouth every twelve (12) hours. Qty: 60 Tablet, Refills: 1  Start date: 10/24/2022           CONTINUE these medications which have NOT CHANGED    Details   calcium-cholecalciferol, d3, (CALCIUM 600 + D) 600-125 mg-unit tab Take  by mouth daily. metFORMIN (GLUCOPHAGE) 1,000 mg tablet Take 1,000 mg by mouth two (2) times daily (with meals). furosemide (LASIX) 20 mg tablet Take 20 mg by mouth as needed. lovastatin (MEVACOR) 20 mg tablet Take 20 mg by mouth nightly. glimepiride (AMARYL) 1 mg tablet Take 2 mg by mouth every morning. aspirin 81 mg chewable tablet Take 81 mg by mouth daily.            STOP taking these medications       losartan (COZAAR) 50 mg tablet Comments:   Reason for Stopping:         DOCOSAHEXANOIC ACID/EPA (FISH OIL PO) Comments:   Reason for Stopping:                 Patient Follow Up Instructions: 1. Recommended diet: diabetic     2. Recommended activity: Activity as tolerated    3. If you experience any of the following symptoms then please call your primary care physician or return to the emergency room if you cannot get hold of your doctor:    4. Wound Care: none     5. Lab work: cbc and cmp in 1 week     6. Check your blood pressure daily and take it to PCP    7. Bring these papers with you to your follow up appointments. The papers will help your doctors be sure to continue the care plan from the hospital.    8. Follow up with your infection and lung doctor for pending tests.      Follow-up Information       Follow up With Specialties Details Why Contact Info    Dina Rinne, [address] [facility] [address]  [phone]      Nurys Felix MD Infectious Disease Physician, Internal Medicine Physician Schedule an appointment as soon as possible for a visit in 1 week(s)  [address] [facility]  [phone]      Shanti Araya MD Pulmonary Disease Schedule an appointment as soon as possible for a visit in 1 week(s)  [address]  [address]  [facility] 83. [phone]      Vivian Hernandez MD Cardiovascular Disease Physician, [address] [facility] Vascular Surgery, Internal Medicine Physician Schedule an appointment as soon as possible for a visit in 1 week(s)  Iris Marrero [phone]      [facility] [address] on 12/15/2022 Please bring completed intake packet with you for appointment at 8:00 AM. [address] [facility], [place] [address] [place]  [phone]          ________________________________________________________________    Risk of deterioration: High    Condition at Discharge:  Stable  __________________________________________________________________    Disposition  Home with family and home health services    ____________________________________________________________________    Code Status: Full Code  ___________________________________________________________________      Total time in minutes spent coordinating this discharge (includes going over instructions, follow-up, prescriptions, and preparing report for sign off to her PCP) :  >30 minutes    Signed:  Apple Koenig MD

## 2022-10-24 NOTE — PROGRESS NOTES
Hospitalist Progress Note    NAME: Radha Darrin   :  1945   MRN:  [id number]       Assessment / Plan:    NSTEMI  - s/p [facility] 10/21 normal coronaries  - on RA  - Morphine PNR  - Nitrtes  - lipitor, cozaar, ASA, coreg    Sepsis leukocytosis 15.9 and tachycardia 130  ? Acute PNA, possibly 2/2 Mycobacterium  - Quantiferon gold and AFB pending  - cont. cefepime and azithro for 7 days. Consider changing to p.o. in a.m. tomorrow  - no indication for bronch at this time  - ID consult appreciate recs  -02 challenge test and case mgmt consult for home o2    New systolic CHF  -Cont coreg, entresto and jardiance  -life vest to be arranged today   -O2 challenge today. Case management consulted to arrange home oxygen.  -Continue PT OT     DM  - hold home Metformin and glipizide  - RISS - normal     Centrilobular Emphysema  - Duoneb; needs outpt PFT    40 or above Morbid obesity / Body mass index is 41.9 kg/m². Estimated discharge date:   Barriers: s/p cath dc in am    Code status: Full  Prophylaxis: Lovenox  Recommended Disposition: Home w/Family     Subjective:     Chief Complaint / Reason for Physician Visit  Shortness of breath is improving. Mild cough. No phlegm. She still on 2 L of        Objective:     VITALS:   Last 24hrs VS reviewed since prior progress note.  Most recent are:  Patient Vitals for the past 24 hrs:   Temp Pulse Resp BP SpO2   10/23/22 2000 97.8 °F (36.6 °C) 70 22 (!) 114/55 97 %   10/23/22 1922 -- -- -- -- 96 %   10/23/22 1730 98 °F (36.7 °C) 65 21 139/88 96 %   10/23/22 1346 -- -- -- -- 92 %   10/23/22 1100 98 °F (36.7 °C) 66 19 99/61 95 %   10/23/22 1014 -- -- -- -- 94 %   10/23/22 0745 97.9 °F (36.6 °C) 76 19 (!) 133/56 91 %   10/23/22 0403 98.4 °F (36.9 °C) 69 22 (!) 120/52 92 %         Intake/Output Summary (Last 24 hours) at 10/23/2022 1355  Last data filed at 10/23/2022 0745  Gross per 24 hour   Intake 240 ml   Output 650 ml   Net -410 ml          I had a face to face encounter and independently examined this patient on 10/23/2022, as outlined below:  PHYSICAL EXAM:  General:  Alert, obese no acute distress    Resp:  Bibasilar rales,  No accessory muscle use  CV:  Regular  rhythm,  No edema  GI:  Soft, Non distended, Non tender. +Bowel sounds  Neurologic:  Alert and oriented X 3, normal speech,   Psych:   Not anxious nor agitated  Skin:  No rashes. No jaundice    Reviewed most current lab test results and cultures  YES  Reviewed most current radiology test results   YES  Review and summation of old records today    NO  Reviewed patient's current orders and MAR    YES  PMH/SH reviewed - no change compared to H&P  ________________________________________________________________________  Care Plan discussed with:    Comments   Patient x    Family      RN x    Care Manager     Consultant                       x Multidiciplinary team rounds were held today with , nursing, pharmacist and clinical coordinator. Patient's plan of care was discussed; medications were reviewed and discharge planning was addressed. ________________________________________________________________________  Total NON critical care TIME:  40   Minutes    Total CRITICAL CARE TIME Spent:   Minutes non procedure based      Comments   >50% of visit spent in counseling and coordination of care x    ________________________________________________________________________  Akash Hilliard MD     Procedures: see electronic medical records for all procedures/Xrays and details which were not copied into this note but were reviewed prior to creation of Plan. LABS:  I reviewed today's most current labs and imaging studies. Pertinent labs include:  Recent Labs     10/21/22  0006   WBC 9.3   HGB 8.5*   HCT 28.5*          No results for input(s): NA, K, CL, CO2, GLU, BUN, CREA, CA, MG, PHOS, ALB, TBIL, TBILI, ALT, INR, INREXT, INREXT in the last 72 hours.     No lab exists for component: SGOT      Signed: Crystal Santos MD

## 2022-10-24 NOTE — PROGRESS NOTES

## 2022-10-24 NOTE — PROGRESS NOTES
Transition of Care Plan:     RUR: 8%  Disposition:   Follow up appointments: PCP, Specialist  DME needed: life vest;    Transportation at Discharge: Family to provide transportation  [address] or means to access home: Family has access  IM Medicare Letter: 2nd IM Letter at d/c  Is patient a [place] and connected with the [place]? N/A             If yes, was [place] transfer form completed and [facility] notified? N/A  Caregiver Contact: - Deidre Ovalle- [phone]  Discharge Caregiver contacted prior to discharge? If pt desires  Care Conference needed?  n/a     UPDATE: per chart review, Pt has been weaned off O2. Pt does not qualify for O2. Pt to discharge home with spouse after IV ABX is completed. 8:40am: CM acknowledged consult to arrange home O2. Pt will need an O2 challenge. CM will continue to follow for discharge planning needs.       Nicolas Alaniz, [facility], CM  [place]

## 2022-10-24 NOTE — PROGRESS NOTES
Pharmacist Discharge Medication Reconciliation    Significant PMH:   Past Medical History:   Diagnosis Date    Diabetes ([facility]     Hypertension      Encounter Diagnoses:   Encounter Diagnoses   Name Primary? NSTEMI (non-ST elevated myocardial infarction) ([facility] Yes    Acute respiratory failure with hypoxia ([facility])     Atypical mycobacterial infection     Bandemia     Controlled diabetes mellitus type 2 with complications, unspecified whether long term insulin use (HCC)     Morbid obesity ([facility])     Pneumonia, bacterial     Shortness of breath     Non-STEMI (non-ST elevated myocardial infarction) ([facility])      Allergies: Bactrim [sulfamethoprim]    Discharge Medications:   Current Discharge Medication List        START taking these medications    Details   carvediloL (COREG) 3.125 mg tablet Take 1 Tablet by mouth two (2) times daily (with meals). Qty: 60 Tablet, Refills: 1  Start date: 10/24/2022      empagliflozin (JARDIANCE) 10 mg tablet Take 1 Tablet by mouth daily. Qty: 30 Tablet, Refills: 1  Start date: 10/25/2022      sacubitril-valsartan (ENTRESTO) 24 mg/26 mg tablet Take 1 Tablet by mouth every twelve (12) hours. Qty: 60 Tablet, Refills: 1  Start date: 10/24/2022           CONTINUE these medications which have NOT CHANGED    Details   calcium-cholecalciferol, d3, (CALCIUM 600 + D) 600-125 mg-unit tab Take  by mouth daily. metFORMIN (GLUCOPHAGE) 1,000 mg tablet Take 1,000 mg by mouth two (2) times daily (with meals). furosemide (LASIX) 20 mg tablet Take 20 mg by mouth as needed. lovastatin (MEVACOR) 20 mg tablet Take 20 mg by mouth nightly. glimepiride (AMARYL) 1 mg tablet Take 2 mg by mouth every morning. aspirin 81 mg chewable tablet Take 81 mg by mouth daily.            STOP taking these medications       losartan (COZAAR) 50 mg tablet Comments:   Reason for Stopping:         DOCOSAHEXANOIC ACID/EPA (FISH OIL PO) Comments:   Reason for Stopping:               The patient's chart, MAR and AVS were reviewed by Geovanna Hatch [facility].     Discharging Provider: Leroy Daiz MD    Thank you,     Geovanna Hatch, [facility]

## 2022-10-24 NOTE — PROGRESS NOTES
Pulse oximetry assessment   94% at rest on room air (if 88% or less, skip next steps)  89% while ambulating on room air, Pt recovered quickly with saturation back up to 93%  N/A% at rest on n/aLPM   N/a% while ambulating on n/aLPM

## 2022-10-25 ENCOUNTER — TELEPHONE (OUTPATIENT)
Dept: FAMILY MEDICINE CLINIC | Age: 77
End: 2022-10-25

## 2022-10-25 LAB
M TB IFN-G BLD-IMP: NEGATIVE
QUANTIFERON CRITERIA, QFI1T: NORMAL
QUANTIFERON MITOGEN VALUE: 3.71 IU/ML
QUANTIFERON NIL VALUE: 0.02 IU/ML
QUANTIFERON TB1 AG: 0 IU/ML
QUANTIFERON TB2 AG: 0.01 IU/ML

## 2022-10-26 LAB — B DERMAT AB TITR SER: NEGATIVE {TITER}

## 2022-10-26 NOTE — TELEPHONE ENCOUNTER
Pt stated she was told to follow up with the ID clinic, she has already been seen by Pulmonology, and have an appt with cardiology and her PCP. Pt states if this is untrue just confirm that with her doctors and let her know.

## 2022-10-27 LAB
ACID FAST STN SPEC: NEGATIVE
MYCOBACTERIUM SPEC QL CULT: NORMAL
SPECIMEN PREPARATION: NORMAL
SPECIMEN SOURCE: NORMAL

## 2022-11-03 NOTE — PROGRESS NOTES
Physician Progress Note      Faye Gomez  [facility] #:                  [id number]  :                       1945  ADMIT DATE:       10/17/2022 2:19 PM  [address] [place] DATE:        10/24/2022 5:38 PM  RESPONDING  PROVIDER #:        Jessica Chapa MD          QUERY TEXT:    Pt admitted with nstemi, pna. Pt noted to have sepsis (dcs).  If possible, please document in progress notes and discharge summary the present on admission status of sepsis :    The medical record reflects the following:  Risk Factors:pna, Suspected Mycobacterium  Clinical Indicators: dcs-Sepsis leukocytosis 15.9 and tachycardia 130,Acute PNA, hr 107-122  Treatment: ID cx, cefepime and Zithromax, Quantiferon gold and AFB pending  Thanks,  Ranjana Lutz RN/CDI   Options provided:  -- Yes, sepsis was present at the time of the order to admit to the hospital  -- No, sepsis was not present on admission and developed during the inpatient stay  -- Other - I will add my own diagnosis  -- Disagree - Not applicable / Not valid  -- Disagree - Clinically unable to determine / Unknown  -- Refer to Clinical Documentation Reviewer    PROVIDER RESPONSE TEXT:    Yes, sepsis was present at the time of the order to admit to the hospital.    Query created by: Marko Baker on 10/31/2022 9:39 AM      Electronically signed by:  Jessica Chapa MD 11/3/2022 4:26 PM

## 2022-12-15 ENCOUNTER — HOSPITAL ENCOUNTER (OUTPATIENT)
Dept: CARDIAC REHAB | Age: 77
Discharge: HOME | End: 2022-12-15
Payer: MEDICARE

## 2022-12-15 VITALS — HEIGHT: 63 IN | BODY MASS INDEX: 43.05 KG/M2

## 2022-12-15 RX ORDER — SPIRONOLACTONE 25 MG/1
TABLET ORAL DAILY
COMMUNITY

## 2022-12-15 NOTE — CARDIO/PULMONARY
[facility]    Cardiac Rehabilitation Program    Intake Appointment  12/15/2022           NAME: [facility] : 1945 AGE: 69 y.o. GENDER: female    CARDIAC REHAB ADMITTING DIAGNOSIS: Heart failure, unspecified [I50.9]    REFERRING PHYSICIAN: Savannah Martin HX:  Past Medical History:   Diagnosis Date    Diabetes ([facility]     Heart failure ([facility]     Hypertension        LABS:     Lab Results   Component Value Date/Time    Hemoglobin A1c 6.9 (H) 10/17/2022 11:43 PM     Lab Results   Component Value Date/Time    Cholesterol, total 153 10/17/2022 11:43 PM    HDL Cholesterol 44 10/17/2022 11:43 PM    LDL, calculated 71.8 10/17/2022 11:43 PM    VLDL, calculated 37.2 10/17/2022 11:43 PM    Triglyceride 186 (H) 10/17/2022 11:43 PM    CHOL/HDL Ratio 3.5 10/17/2022 11:43 PM         MEDICATIONS/SUPPLEMENTS:     Prior to Admission medications    Medication Sig Start Date End Date Taking? Authorizing Provider   spironolactone (ALDACTONE) 25 mg tablet Take  by mouth daily. Yes Provider, Historical   carvediloL (COREG) 3.125 mg tablet Take 1 Tablet by mouth two (2) times daily (with meals). 10/24/22  Yes Gege Iniguez MD   empagliflozin (JARDIANCE) 10 mg tablet Take 1 Tablet by mouth daily. 10/25/22  Yes Gege Iniguez MD   sacubitril-valsartan (ENTRESTO) 24 mg/26 mg tablet Take 1 Tablet by mouth every twelve (12) hours. 10/24/22  Yes Gege Iniguez MD   calcium-cholecalciferol, d3, (CALCIUM 600 + D) 600-125 mg-unit tab Take  by mouth daily. Yes Provider, Historical   metFORMIN (GLUCOPHAGE) 1,000 mg tablet Take 1,000 mg by mouth two (2) times daily (with meals). Yes Provider, Historical   furosemide (LASIX) 20 mg tablet Take 20 mg by mouth as needed. Yes Provider, Historical   lovastatin (MEVACOR) 20 mg tablet Take 20 mg by mouth nightly. Yes Other, MD Mare   glimepiride (AMARYL) 1 mg tablet Take 2 mg by mouth every morning.    Yes Other, MD Mare   aspirin 81 mg chewable tablet Take 81 mg by mouth daily. Yes Other, MD Mare       ANTHROPOMETRICS:      Ht Readings from Last 1 Encounters:   12/15/22 5' 3\" (1.6 m)      Wt Readings from Last 1 Encounters:   10/24/22 110.2 kg (243 lb)        WAIST: 47.5    SCREENING SCORES:                       [facility]: 31.45  [facility]: 18  Rate Your Plate: 62  Cardiac Knowledge: 10  PHQ-9: 1       VISIT SUMMARY:    Sarah Bullard 69 y.o. presented to [facility] for program orientation and 6 minute walk test today with a primary diagnosis of Heart failure, unspecified [I50.9]. Cardiac risk factors include dyslipidemia, diabetes mellitus, hypertension. EF is 25 %. Yung Clements is  and lives with her . PHQ9, depression score, is  1 and this is considered mild. The result was discussed with patient who affirms score to be accurate. Patient was unable to complete 6MWT today as there was no signed referral on chart, Dr. Justin Garcia office was faxed again to notify need of referral.   Patient will attend cardiac rehab 2 times/week and also plans to meet with the dietician. Patient states that he/she would like to accomplish the following by completion of the program:    Initial Program Goals: Increase ejection fraction   2.  Lose 5% of body weight         Balaji Griffin

## 2022-12-20 ENCOUNTER — HOSPITAL ENCOUNTER (OUTPATIENT)
Dept: CARDIAC REHAB | Age: 77
Discharge: HOME | End: 2022-12-20
Payer: MEDICARE

## 2022-12-20 VITALS — BODY MASS INDEX: 39.68 KG/M2 | WEIGHT: 224 LBS

## 2022-12-20 PROCEDURE — 93798 PHYS/QHP OP CAR RHAB W/ECG: CPT

## 2022-12-22 ENCOUNTER — HOSPITAL ENCOUNTER (OUTPATIENT)
Dept: CARDIAC REHAB | Age: 77
Discharge: HOME | End: 2022-12-22
Payer: MEDICARE

## 2022-12-22 VITALS — BODY MASS INDEX: 39.68 KG/M2 | WEIGHT: 224 LBS

## 2022-12-22 PROCEDURE — 93798 PHYS/QHP OP CAR RHAB W/ECG: CPT

## 2022-12-27 ENCOUNTER — APPOINTMENT (OUTPATIENT)
Dept: CARDIAC REHAB | Age: 77
End: 2022-12-27
Payer: MEDICARE

## 2022-12-29 ENCOUNTER — APPOINTMENT (OUTPATIENT)
Dept: CARDIAC REHAB | Age: 77
End: 2022-12-29
Payer: MEDICARE

## 2023-01-03 ENCOUNTER — HOSPITAL ENCOUNTER (OUTPATIENT)
Dept: CARDIAC REHAB | Age: 78
Discharge: HOME | End: 2023-01-03
Payer: MEDICARE

## 2023-01-03 VITALS — BODY MASS INDEX: 39.33 KG/M2 | WEIGHT: 222 LBS

## 2023-01-03 PROCEDURE — 93798 PHYS/QHP OP CAR RHAB W/ECG: CPT

## 2023-01-05 ENCOUNTER — HOSPITAL ENCOUNTER (OUTPATIENT)
Dept: CARDIAC REHAB | Age: 78
Discharge: HOME | End: 2023-01-05
Payer: MEDICARE

## 2023-01-05 VITALS — BODY MASS INDEX: 39.47 KG/M2 | WEIGHT: 222.8 LBS

## 2023-01-05 PROCEDURE — 93798 PHYS/QHP OP CAR RHAB W/ECG: CPT

## 2023-01-12 ENCOUNTER — HOSPITAL ENCOUNTER (OUTPATIENT)
Dept: CARDIAC REHAB | Age: 78
Discharge: HOME | End: 2023-01-12
Payer: MEDICARE

## 2023-01-12 VITALS — WEIGHT: 224 LBS | BODY MASS INDEX: 39.68 KG/M2

## 2023-01-12 PROCEDURE — 93798 PHYS/QHP OP CAR RHAB W/ECG: CPT

## 2023-01-17 ENCOUNTER — HOSPITAL ENCOUNTER (OUTPATIENT)
Dept: CARDIAC REHAB | Age: 78
Discharge: HOME | End: 2023-01-17
Payer: MEDICARE

## 2023-01-17 PROCEDURE — 93798 PHYS/QHP OP CAR RHAB W/ECG: CPT

## 2023-01-19 ENCOUNTER — HOSPITAL ENCOUNTER (OUTPATIENT)
Dept: CARDIAC REHAB | Age: 78
Discharge: HOME | End: 2023-01-19
Payer: MEDICARE

## 2023-01-19 VITALS — WEIGHT: 223 LBS | BODY MASS INDEX: 39.5 KG/M2

## 2023-01-19 PROCEDURE — 93798 PHYS/QHP OP CAR RHAB W/ECG: CPT

## 2023-01-24 ENCOUNTER — HOSPITAL ENCOUNTER (OUTPATIENT)
Dept: CARDIAC REHAB | Age: 78
Discharge: HOME | End: 2023-01-24
Payer: MEDICARE

## 2023-01-24 VITALS — BODY MASS INDEX: 39.68 KG/M2 | WEIGHT: 224 LBS

## 2023-01-24 PROCEDURE — 93798 PHYS/QHP OP CAR RHAB W/ECG: CPT

## 2023-01-24 PROCEDURE — 93797 PHYS/QHP OP CAR RHAB WO ECG: CPT

## 2023-01-24 NOTE — CARDIO/PULMONARY
Cardiac Rehab Nutrition Assessment - 1:1 Evaluation         NAME: Tamia Medina : 1945 AGE: 69 y.o. GENDER: female  CARDIAC REHAB ADMITTING DIAGNOSIS: HF (EF: 25%)    PROBLEM LIST:  Patient Active Problem List   Diagnosis Code    NSTEMI (non-ST elevated [facility]) ([facility] I21.4       LABS:   Lab Results   Component Value Date/Time    Hemoglobin A1c 6.9 (H) 10/17/2022 11:43 PM     Lab Results   Component Value Date/Time    Cholesterol, total 153 10/17/2022 11:43 PM    HDL Cholesterol 44 10/17/2022 11:43 PM    LDL, calculated 71.8 10/17/2022 11:43 PM    VLDL, calculated 37.2 10/17/2022 11:43 PM    Triglyceride 186 (H) 10/17/2022 11:43 PM    CHOL/HDL Ratio 3.5 10/17/2022 11:43 PM       MEDICATIONS/SUPPLEMENTS:   Prior to Admission medications    Medication Sig Start Date End Date Taking? Authorizing Provider   spironolactone (ALDACTONE) 25 mg tablet Take  by mouth daily. Provider, Historical   carvediloL (COREG) 3.125 mg tablet Take 1 Tablet by mouth two (2) times daily (with meals). 10/24/22   Scooter Reyes MD   empagliflozin (JARDIANCE) 10 mg tablet Take 1 Tablet by mouth daily. 10/25/22   Scooter Reyes MD   sacubitril-valsartan (ENTRESTO) 24 mg/26 mg tablet Take 1 Tablet by mouth every twelve (12) hours. 10/24/22   Scooter Reyes MD   calcium-cholecalciferol, d3, (CALCIUM 600 + D) 600-125 mg-unit tab Take  by mouth daily. Provider, Historical   metFORMIN (GLUCOPHAGE) 1,000 mg tablet Take 1,000 mg by mouth two (2) times daily (with meals). Provider, Historical   furosemide (LASIX) 20 mg tablet Take 20 mg by mouth as needed. Provider, Historical   lovastatin (MEVACOR) 20 mg tablet Take 20 mg by mouth nightly. Mare Marx MD   glimepiride (AMARYL) 1 mg tablet Take 2 mg by mouth every morning. Mare Marx MD   aspirin 81 mg chewable tablet Take 81 mg by mouth daily. Mare Marx MD     Checks BG every morning, around 120-125 mg/dL.   mg/dL this AM. ANTHROPOMETRICS:    Ht Readings from Last 1 Encounters:   12/15/22 5' 3\" (1.6 m)      Wt Readings from Last 10 Encounters:   01/19/23 101.2 kg (223 lb)   01/17/23 (P) 101.6 kg (224 lb)   01/12/23 101.6 kg (224 lb)   01/05/23 101.1 kg (222 lb 12.8 oz)   01/03/23 100.7 kg (222 lb)   12/22/22 101.6 kg (224 lb)   12/20/22 101.6 kg (224 lb)   10/24/22 110.2 kg (243 lb)   09/30/22 103.3 kg (227 lb 12.8 oz)   09/23/20 109.3 kg (241 lb)      BMI: 39.50 kg/M2 Category: obese (class I)  Waist (measured at intake): 47.5 inches    Reported Wt Hx: Stable around 225#. Highest weight was 232# but has lost down to current weight since last year. Reported Diet Hx:     Rate Your Plate Score: 62  (Score 58-72: Making many healthy choices; 41-57: Some choices need improving 24-40: many choices need improving)    24 HOUR DIET RECALL  Breakfast Cereal with fresh fruit (blueberries/strawberries), sweetened with splenda; turkey sausage & 2 eggs, 1 slice 302 bread or 1 piece of raisin bread    Lunch [place] with turkey spam, mustard (1x/month); cheese and crackers; salad with veggies    Dinner 1/2 sweet potato, pork BBQ (sugar free BBQ sauce); fish or chicken/turkey most other days with vegetable    Snacks Pack of pretzels with cheese; rarely eats    Beverages Low sodium v8, water, sparkling ice drinks; 2 cups of coffee/day     Sarah Bullard limits fried foods, uses olive oil to cook with or isreal spray. Dines out once/month but tries to choose healthy option. Expressed fiances are obstacle to eating right. Environmental/Social: Walks on treadmill every day, but knee has been bothering her recently. Pt grocery shops and cooks for family. Estimated Energy Needs: 4068-6299 (using Makayla Due with AF 1.2-1.3)    NUTRITION INTERVENTION:  Nutrition 60 minute one-on-one education & goal setting with [address] [place] [address] with Wiley Fernández relevant labs compared to ideals.     Reviewed weight history and patient's verbalized weight goal as well as any real or perceived barriers to obtaining the goal. Collaborated with patient to set a specific short and long term weight goal.     Reviewed Rate Your Plate and conducted a verbal diet recall. Assessed for environmental, financial, psychosocial, physical and comorbidities that may impact the food and eating patterns / behaviors of [address] [place] [address] with patient to set specific nutrient goals as well as specific food / behavior changes that will help patient meet the overall goal of following a heart healthy eating pattern (using guidelines as set forth by the American Heart Association and modeled after healthful eating patterns as recognized by the USDA Dietary Guidelines such as DASH, Mediterranean or plant-based). Briefly reviewed with Rhonda Sanchez the nutrition information in the Cardiac Rehab patient education book and encouraged Rhonda Sanchez to read thoroughly, ask questions as needed, and use for future reference for heart healthy nutrition information. Rhonda Sanchez is not scheduled to participate in Cardiac Rehab group nutrition classes. PATIENT GOALS:    Weight Goals: lose 5% of BW by end of program    Nutrition Goals:  Daily Recommendations:  Calories: 2124-1351 /day  (for weight loss)  Saturated Fat: no more than 16 g/day  Trans Fat: 0 g/day  Sodium: no more than 6809-7446 mg/day  Fruit: 1-2 cups / day  Vegetables: 2 cups/day    Other:  - read and compare food labels  - choose low sodium or no salt added packaged/prepared foods       Keeping a food diary was recommended. Questions addressed. Follow-up plans discussed. Sarah Bullard verbalized understanding.             Tito Barreto RD

## 2023-01-24 NOTE — CARDIO/PULMONARY
Blood pressures consistently elevated at CP rehab sessions. Copy of BP's aaxed to Dr. Juan Jose Gustafson office to be made aware.

## 2023-01-26 ENCOUNTER — APPOINTMENT (OUTPATIENT)
Dept: CARDIAC REHAB | Age: 78
End: 2023-01-26
Payer: MEDICARE

## 2023-01-31 ENCOUNTER — HOSPITAL ENCOUNTER (OUTPATIENT)
Dept: CARDIAC REHAB | Age: 78
Discharge: HOME | End: 2023-01-31
Payer: MEDICARE

## 2023-01-31 VITALS — WEIGHT: 224 LBS | BODY MASS INDEX: 39.68 KG/M2

## 2023-01-31 PROCEDURE — 93798 PHYS/QHP OP CAR RHAB W/ECG: CPT

## 2023-02-02 ENCOUNTER — APPOINTMENT (OUTPATIENT)
Dept: CARDIAC REHAB | Age: 78
End: 2023-02-02
Payer: MEDICARE

## 2023-02-03 ENCOUNTER — HOSPITAL ENCOUNTER (OUTPATIENT)
Dept: CARDIAC REHAB | Age: 78
Discharge: HOME | End: 2023-02-03
Payer: MEDICARE

## 2023-02-03 VITALS — BODY MASS INDEX: 39.5 KG/M2 | WEIGHT: 223 LBS

## 2023-02-03 PROCEDURE — 93798 PHYS/QHP OP CAR RHAB W/ECG: CPT

## 2023-02-07 ENCOUNTER — HOSPITAL ENCOUNTER (OUTPATIENT)
Dept: CARDIAC REHAB | Age: 78
Discharge: HOME | End: 2023-02-07
Payer: MEDICARE

## 2023-02-07 VITALS — BODY MASS INDEX: 39.5 KG/M2 | WEIGHT: 223 LBS

## 2023-02-07 PROCEDURE — 93798 PHYS/QHP OP CAR RHAB W/ECG: CPT

## 2023-02-09 ENCOUNTER — HOSPITAL ENCOUNTER (OUTPATIENT)
Dept: CARDIAC REHAB | Age: 78
Discharge: HOME | End: 2023-02-09
Payer: MEDICARE

## 2023-02-09 VITALS — WEIGHT: 224 LBS | BODY MASS INDEX: 39.68 KG/M2

## 2023-02-09 PROCEDURE — 93798 PHYS/QHP OP CAR RHAB W/ECG: CPT

## 2023-02-14 ENCOUNTER — HOSPITAL ENCOUNTER (OUTPATIENT)
Dept: CARDIAC REHAB | Age: 78
Discharge: HOME | End: 2023-02-14
Payer: MEDICARE

## 2023-02-14 VITALS — BODY MASS INDEX: 39.33 KG/M2 | WEIGHT: 222 LBS

## 2023-02-14 PROCEDURE — 93798 PHYS/QHP OP CAR RHAB W/ECG: CPT

## 2023-02-16 ENCOUNTER — HOSPITAL ENCOUNTER (OUTPATIENT)
Dept: CARDIAC REHAB | Age: 78
Discharge: HOME | End: 2023-02-16
Payer: MEDICARE

## 2023-02-21 ENCOUNTER — APPOINTMENT (OUTPATIENT)
Dept: CARDIAC REHAB | Age: 78
End: 2023-02-21
Payer: MEDICARE

## 2023-02-23 ENCOUNTER — APPOINTMENT (OUTPATIENT)
Dept: CARDIAC REHAB | Age: 78
End: 2023-02-23
Payer: MEDICARE

## 2023-02-28 ENCOUNTER — APPOINTMENT (OUTPATIENT)
Dept: CARDIAC REHAB | Age: 78
End: 2023-02-28
Payer: MEDICARE

## 2023-03-02 ENCOUNTER — APPOINTMENT (OUTPATIENT)
Dept: CARDIAC REHAB | Age: 78
End: 2023-03-02

## 2023-03-07 ENCOUNTER — APPOINTMENT (OUTPATIENT)
Dept: CARDIAC REHAB | Age: 78
End: 2023-03-07

## 2023-03-09 ENCOUNTER — APPOINTMENT (OUTPATIENT)
Dept: CARDIAC REHAB | Age: 78
End: 2023-03-09

## 2023-03-14 ENCOUNTER — APPOINTMENT (OUTPATIENT)
Dept: CARDIAC REHAB | Age: 78
End: 2023-03-14

## 2023-03-16 ENCOUNTER — APPOINTMENT (OUTPATIENT)
Dept: CARDIAC REHAB | Age: 78
End: 2023-03-16

## 2023-03-21 ENCOUNTER — APPOINTMENT (OUTPATIENT)
Dept: CARDIAC REHAB | Age: 78
End: 2023-03-21

## 2023-03-23 ENCOUNTER — APPOINTMENT (OUTPATIENT)
Dept: CARDIAC REHAB | Age: 78
End: 2023-03-23

## 2023-03-28 ENCOUNTER — APPOINTMENT (OUTPATIENT)
Dept: CARDIAC REHAB | Age: 78
End: 2023-03-28

## 2023-03-30 ENCOUNTER — APPOINTMENT (OUTPATIENT)
Dept: CARDIAC REHAB | Age: 78
End: 2023-03-30

## 2023-04-04 ENCOUNTER — APPOINTMENT (OUTPATIENT)
Dept: CARDIAC REHAB | Age: 78
End: 2023-04-04

## 2023-04-06 ENCOUNTER — APPOINTMENT (OUTPATIENT)
Dept: CARDIAC REHAB | Age: 78
End: 2023-04-06

## 2023-04-11 ENCOUNTER — APPOINTMENT (OUTPATIENT)
Dept: CARDIAC REHAB | Age: 78
End: 2023-04-11

## 2023-04-13 ENCOUNTER — APPOINTMENT (OUTPATIENT)
Dept: CARDIAC REHAB | Age: 78
End: 2023-04-13

## 2023-05-23 ENCOUNTER — TRANSCRIBE ORDERS (OUTPATIENT)
Facility: HOSPITAL | Age: 78
End: 2023-05-23

## 2023-05-23 DIAGNOSIS — Z12.31 SCREENING MAMMOGRAM FOR HIGH-RISK PATIENT: Primary | ICD-10-CM

## 2023-06-08 ENCOUNTER — TRANSCRIBE ORDERS (OUTPATIENT)
Facility: HOSPITAL | Age: 78
End: 2023-06-08

## 2023-06-08 DIAGNOSIS — E21.0 PRIMARY HYPERPARATHYROIDISM (HCC): Primary | ICD-10-CM

## 2023-06-20 ENCOUNTER — HOSPITAL ENCOUNTER (OUTPATIENT)
Facility: HOSPITAL | Age: 78
Discharge: HOME | End: 2023-06-23
Attending: FAMILY MEDICINE
Payer: MEDICARE

## 2023-06-20 DIAGNOSIS — E21.0 PRIMARY HYPERPARATHYROIDISM (HCC): ICD-10-CM

## 2023-06-20 PROCEDURE — 78070 PARATHYROID PLANAR IMAGING: CPT

## 2023-06-20 PROCEDURE — 76536 US EXAM OF HEAD AND NECK: CPT

## 2023-06-20 PROCEDURE — 3430000000 HC RX DIAGNOSTIC RADIOPHARMACEUTICAL: Performed by: FAMILY MEDICINE

## 2023-06-20 PROCEDURE — A9500 TC99M SESTAMIBI: HCPCS | Performed by: FAMILY MEDICINE

## 2023-06-20 RX ORDER — TETRAKIS(2-METHOXYISOBUTYLISOCYANIDE)COPPER(I) TETRAFLUOROBORATE 1 MG/ML
25.4 INJECTION, POWDER, LYOPHILIZED, FOR SOLUTION INTRAVENOUS
Status: COMPLETED | OUTPATIENT
Start: 2023-06-20 | End: 2023-06-20

## 2023-06-20 RX ADMIN — TETRAKIS(2-METHOXYISOBUTYLISOCYANIDE)COPPER(I) TETRAFLUOROBORATE 25.4 MILLICURIE: 1 INJECTION, POWDER, LYOPHILIZED, FOR SOLUTION INTRAVENOUS at 11:43

## 2023-07-19 ENCOUNTER — OFFICE VISIT (OUTPATIENT)
Age: 78
End: 2023-07-19
Payer: MEDICARE

## 2023-07-19 VITALS
DIASTOLIC BLOOD PRESSURE: 76 MMHG | RESPIRATION RATE: 20 BRPM | WEIGHT: 221.6 LBS | HEIGHT: 63 IN | TEMPERATURE: 98.3 F | OXYGEN SATURATION: 93 % | SYSTOLIC BLOOD PRESSURE: 132 MMHG | BODY MASS INDEX: 39.27 KG/M2 | HEART RATE: 82 BPM

## 2023-07-19 DIAGNOSIS — E04.1 LEFT THYROID NODULE: ICD-10-CM

## 2023-07-19 DIAGNOSIS — E21.3 HYPERPARATHYROIDISM (HCC): Primary | ICD-10-CM

## 2023-07-19 PROCEDURE — 99205 OFFICE O/P NEW HI 60 MIN: CPT | Performed by: SURGERY

## 2023-07-19 PROCEDURE — 1123F ACP DISCUSS/DSCN MKR DOCD: CPT | Performed by: SURGERY

## 2023-07-19 PROCEDURE — 1090F PRES/ABSN URINE INCON ASSESS: CPT | Performed by: SURGERY

## 2023-07-19 PROCEDURE — G8400 PT W/DXA NO RESULTS DOC: HCPCS | Performed by: SURGERY

## 2023-07-19 PROCEDURE — 1036F TOBACCO NON-USER: CPT | Performed by: SURGERY

## 2023-07-19 PROCEDURE — G8427 DOCREV CUR MEDS BY ELIG CLIN: HCPCS | Performed by: SURGERY

## 2023-07-19 PROCEDURE — G8417 CALC BMI ABV UP PARAM F/U: HCPCS | Performed by: SURGERY

## 2023-07-19 RX ORDER — GLIMEPIRIDE 2 MG/1
2 TABLET ORAL EVERY MORNING
COMMUNITY
Start: 2023-05-30

## 2023-07-19 RX ORDER — BLOOD SUGAR DIAGNOSTIC
STRIP MISCELLANEOUS
COMMUNITY
Start: 2023-05-24 | End: 2023-07-19

## 2023-07-19 RX ORDER — FAMOTIDINE 40 MG/1
40 TABLET, FILM COATED ORAL DAILY PRN
COMMUNITY

## 2023-07-19 ASSESSMENT — PATIENT HEALTH QUESTIONNAIRE - PHQ9
SUM OF ALL RESPONSES TO PHQ9 QUESTIONS 1 & 2: 0
SUM OF ALL RESPONSES TO PHQ QUESTIONS 1-9: 0
1. LITTLE INTEREST OR PLEASURE IN DOING THINGS: 0
2. FEELING DOWN, DEPRESSED OR HOPELESS: 0
SUM OF ALL RESPONSES TO PHQ QUESTIONS 1-9: 0

## 2023-07-19 ASSESSMENT — ENCOUNTER SYMPTOMS
DIARRHEA: 0
SHORTNESS OF BREATH: 0
WHEEZING: 0
CONSTIPATION: 0
BLOOD IN STOOL: 0
STRIDOR: 0
BACK PAIN: 0
EYE PAIN: 0
VOMITING: 0
COUGH: 0
SORE THROAT: 0
NAUSEA: 0
ABDOMINAL PAIN: 0

## 2023-07-19 NOTE — PROGRESS NOTES
Subjective:      Patient ID: Andreea Serrano is a 69 y.o. female who comes in for consultation by Fabiola Tiwari MD for a elevated calcium      Chief Complaint   Patient presents with    Thyroid Problem     Seen at the request of Dr Sharad Hadley for evaluation of possible hyperparathyroidism       HPI  She has been noted to have an elevated calcium for over 2 years. She was taking calcium and stopped it but hypercalcemia persisted. Most recent labs 2023 had an intact PTH of 47 and calcium of 11.1 with albumin of 5.0. She has known osteoporosis but no fractures, bone pain, kidney stones. She reports some mild dysphagia but denies voice changes or palpitations, exposure to radiation, unexplained weight loss. .  She had a neck US and NM parathyroid scan which were normal except for a 4.8 cm left thyroid nodule.     Past Medical History:   Diagnosis Date    Diabetes ([address])     Heart failure ([address])     Hyperlipidemia     Hypertension     Osteoarthritis      Past Surgical History:   Procedure Laterality Date    BREAST BIOPSY Left     40 yrs ago--neg    CATARACT REMOVAL Bilateral     COLONOSCOPY N/A 2022    COLONOSCOPY performed by Behzad Viramontes MD at [facility]OSCOPY    COLONOSCOPY N/A 2020    COLONOSCOPY performed by Annelise Ramirez MD at [address]  2020         HEENT      upper lid lift    ORTHOPEDIC SURGERY Bilateral     carpal tunnel release    OTHER SURGICAL HISTORY      colonoscopy     Family History   Problem Relation Age of Onset    Diabetes Mother     Hypertension Mother     Hypertension Father     Other Father         cerebral aneurysm    Diabetes Sister     Diabetes Brother     Lung Disease Brother     Lung Cancer Brother     Hypertension Brother      Social History     Tobacco Use    Smoking status: Former     Packs/day: 1.00     Years: 35.00     Pack years: 35.00     Types: Cigarettes     Quit date: 1998     Years since quittin.2    Smokeless

## 2023-07-21 ENCOUNTER — TELEPHONE (OUTPATIENT)
Age: 78
End: 2023-07-21

## 2023-07-21 NOTE — TELEPHONE ENCOUNTER
Patient called wanting the number to call and schedule their CT scan. I spoke with the nurse to get the right ones, then gave them to her.

## 2023-07-27 ENCOUNTER — HOSPITAL ENCOUNTER (OUTPATIENT)
Facility: HOSPITAL | Age: 78
Discharge: HOME | End: 2023-07-27
Attending: SURGERY
Payer: MEDICARE

## 2023-07-27 DIAGNOSIS — E21.3 HYPERPARATHYROIDISM (HCC): ICD-10-CM

## 2023-07-27 LAB — CREAT BLD-MCNC: 1 MG/DL (ref 0.6–1.3)

## 2023-07-27 PROCEDURE — 6360000004 HC RX CONTRAST MEDICATION: Performed by: FAMILY MEDICINE

## 2023-07-27 PROCEDURE — 82565 ASSAY OF CREATININE: CPT

## 2023-07-27 PROCEDURE — 70492 CT SFT TSUE NCK W/O & W/DYE: CPT

## 2023-07-27 RX ADMIN — IOPAMIDOL 100 ML: 755 INJECTION, SOLUTION INTRAVENOUS at 08:41

## 2023-08-25 ENCOUNTER — OFFICE VISIT (OUTPATIENT)
Age: 78
End: 2023-08-25
Payer: MEDICARE

## 2023-08-25 VITALS
TEMPERATURE: 97.8 F | RESPIRATION RATE: 20 BRPM | DIASTOLIC BLOOD PRESSURE: 76 MMHG | BODY MASS INDEX: 39.16 KG/M2 | WEIGHT: 221 LBS | SYSTOLIC BLOOD PRESSURE: 136 MMHG | HEART RATE: 79 BPM | OXYGEN SATURATION: 93 % | HEIGHT: 63 IN

## 2023-08-25 DIAGNOSIS — E04.1 LEFT THYROID NODULE: ICD-10-CM

## 2023-08-25 DIAGNOSIS — E21.3 HYPERPARATHYROIDISM (HCC): Primary | ICD-10-CM

## 2023-08-25 PROCEDURE — G8427 DOCREV CUR MEDS BY ELIG CLIN: HCPCS | Performed by: SURGERY

## 2023-08-25 PROCEDURE — 1036F TOBACCO NON-USER: CPT | Performed by: SURGERY

## 2023-08-25 PROCEDURE — G8400 PT W/DXA NO RESULTS DOC: HCPCS | Performed by: SURGERY

## 2023-08-25 PROCEDURE — 1123F ACP DISCUSS/DSCN MKR DOCD: CPT | Performed by: SURGERY

## 2023-08-25 PROCEDURE — 1090F PRES/ABSN URINE INCON ASSESS: CPT | Performed by: SURGERY

## 2023-08-25 PROCEDURE — G8417 CALC BMI ABV UP PARAM F/U: HCPCS | Performed by: SURGERY

## 2023-08-25 PROCEDURE — 99214 OFFICE O/P EST MOD 30 MIN: CPT | Performed by: SURGERY

## 2023-08-25 RX ORDER — NYSTATIN 100000 U/G
1 CREAM TOPICAL 2 TIMES DAILY
COMMUNITY
Start: 2023-08-08

## 2023-08-25 ASSESSMENT — PATIENT HEALTH QUESTIONNAIRE - PHQ9
SUM OF ALL RESPONSES TO PHQ9 QUESTIONS 1 & 2: 0
SUM OF ALL RESPONSES TO PHQ QUESTIONS 1-9: 0
SUM OF ALL RESPONSES TO PHQ QUESTIONS 1-9: 0
1. LITTLE INTEREST OR PLEASURE IN DOING THINGS: 0
2. FEELING DOWN, DEPRESSED OR HOPELESS: 0
SUM OF ALL RESPONSES TO PHQ QUESTIONS 1-9: 0
SUM OF ALL RESPONSES TO PHQ QUESTIONS 1-9: 0

## 2023-08-25 ASSESSMENT — ENCOUNTER SYMPTOMS
SHORTNESS OF BREATH: 0
ABDOMINAL PAIN: 0
SORE THROAT: 0
VOMITING: 0
COUGH: 0
EYE PAIN: 0
STRIDOR: 0
DIARRHEA: 0
BLOOD IN STOOL: 0
WHEEZING: 0
NAUSEA: 0
CONSTIPATION: 0
BACK PAIN: 0

## 2023-09-08 ENCOUNTER — PROCEDURE VISIT (OUTPATIENT)
Age: 78
End: 2023-09-08

## 2023-09-08 VITALS
SYSTOLIC BLOOD PRESSURE: 136 MMHG | HEIGHT: 63 IN | HEART RATE: 73 BPM | OXYGEN SATURATION: 94 % | TEMPERATURE: 97.4 F | BODY MASS INDEX: 39.65 KG/M2 | WEIGHT: 223.8 LBS | RESPIRATION RATE: 18 BRPM | DIASTOLIC BLOOD PRESSURE: 71 MMHG

## 2023-09-08 DIAGNOSIS — E04.1 LEFT THYROID NODULE: Primary | ICD-10-CM

## 2023-09-08 ASSESSMENT — PATIENT HEALTH QUESTIONNAIRE - PHQ9
SUM OF ALL RESPONSES TO PHQ QUESTIONS 1-9: 0
SUM OF ALL RESPONSES TO PHQ QUESTIONS 1-9: 0
2. FEELING DOWN, DEPRESSED OR HOPELESS: 0
SUM OF ALL RESPONSES TO PHQ9 QUESTIONS 1 & 2: 0
1. LITTLE INTEREST OR PLEASURE IN DOING THINGS: 0
SUM OF ALL RESPONSES TO PHQ QUESTIONS 1-9: 0
SUM OF ALL RESPONSES TO PHQ QUESTIONS 1-9: 0

## 2023-09-08 NOTE — PROGRESS NOTES
[facility] at [address] [place] [address] NOTE        Chart reviewed for the following:   Cheyenne PAVON MA, have reviewed the History, Physical and updated the Allergic reactions for Cait Frazier, 1945     TIME OUT performed immediately prior to start of procedure:   Cheyenne PAVON MA, have performed the following reviews on Cait Frazier prior to the start of the procedure:            * Patient was identified by name and date of birth   * Agreement on procedure being performed was verified  * Risks and Benefits explained to the patient  * Procedure site verified and marked as necessary  * Patient was positioned for comfort  * Consent was signed and verified     Time: 1125am      Date of procedure: 9/8/2023    Procedure performed by:  Maryann Rios MD    Provider assisted by: Cheyenne Eaton MA    Patient assisted by: self    How tolerated by patient: tolerated the procedure well with no complications    Post Procedural Pain Scale: 0 - No Hurt    Comments:  [facility] After care instructions and ice pack provided.

## 2023-09-08 NOTE — PROGRESS NOTES
Procedure Note    Pre Procedure Diagnosis:  Left thyroid nodule  Post Procedure Diagnosis:  Left thyroid nodule  Procedure:  1. Ultrasound guided FNA of left thyroid nodule  Surgeon:   Jan Gonzalez MD FACS  Pathology:   left thyroid nodule  EBL minimal    Procedure:    After informed consent, I utilized a [address] with a high frequency linear array transducer and two 22 and two 25 gauge needles to perform four passes through a 4.8 cm solid hypoechoic left thyroid nodule. Specimens were placed in specialized containers from [facility]. She tolerated it well.         Signed  Jan Gonzalez MD FACS

## 2023-09-14 ENCOUNTER — TELEPHONE (OUTPATIENT)
Age: 78
End: 2023-09-14

## 2023-09-14 DIAGNOSIS — E21.3 HYPERPARATHYROIDISM (HCC): ICD-10-CM

## 2023-09-14 NOTE — TELEPHONE ENCOUNTER
Spoke with Mrs. Alli Edge. Advised that the [facility] FNA results came back as non-diagnostic and that Dr. Wojciech Scott recommends a repeat biopsy. Patient expressed understanding. Scheduled for 9/22/23.

## 2023-09-22 ENCOUNTER — PROCEDURE VISIT (OUTPATIENT)
Age: 78
End: 2023-09-22

## 2023-09-22 VITALS
TEMPERATURE: 97.5 F | BODY MASS INDEX: 39.69 KG/M2 | HEIGHT: 63 IN | WEIGHT: 224 LBS | HEART RATE: 67 BPM | OXYGEN SATURATION: 94 % | RESPIRATION RATE: 18 BRPM | SYSTOLIC BLOOD PRESSURE: 119 MMHG | DIASTOLIC BLOOD PRESSURE: 72 MMHG

## 2023-09-22 DIAGNOSIS — E04.1 LEFT THYROID NODULE: Primary | ICD-10-CM

## 2023-09-22 ASSESSMENT — PATIENT HEALTH QUESTIONNAIRE - PHQ9
SUM OF ALL RESPONSES TO PHQ QUESTIONS 1-9: 0
SUM OF ALL RESPONSES TO PHQ9 QUESTIONS 1 & 2: 0
SUM OF ALL RESPONSES TO PHQ QUESTIONS 1-9: 0
1. LITTLE INTEREST OR PLEASURE IN DOING THINGS: 0
2. FEELING DOWN, DEPRESSED OR HOPELESS: 0

## 2023-09-22 NOTE — PROGRESS NOTES
[facility] @ [facility]  OFFICE PROCEDURE PROGRESS NOTE        Chart reviewed for the following:   Kannan PAVON MA, have reviewed the History, Physical and updated the Allergic reactions for Valentine Herrmann, 1945     TIME OUT performed immediately prior to start of procedure:   Kannan PAVON MA, have performed the following reviews on Valentine Herrmann prior to the start of the procedure:            * Patient was identified by name and date of birth   * Agreement on procedure being performed was verified  * Risks and Benefits explained to the patient  * Procedure site verified and marked as necessary  * Patient was positioned for comfort  * Consent was signed and verified     Time: 1440      Date of procedure: 9/22/2023    Procedure performed by:  Dakota Rios MD    Provider assisted by: Kannan Kincaid MA    Patient assisted by: self    How tolerated by patient: tolerated the procedure well with no complications    Post Procedural Pain Scale: 0 - No Hurt    Comments:  Provided ice pack, comfort and after care instructions. Specimen sent to [facility].

## 2023-09-22 NOTE — PROGRESS NOTES
Procedure Note    Pre Procedure Diagnosis:  Left thyroid nodule  Post Procedure Diagnosis:  Left thyroid nodule  Procedure:  1. Ultrasound guided FNA of left thyroid nodule  Surgeon:   Shannon Kim MD FACS  Pathology:   left thyroid nodule  EBL minimal    Procedure:    After informed consent, I utilized a [address] with a high frequency linear array transducer and a 20 gauge Tenmo spring loaded biopsy device to perform five passes through a 4.8 cm solid hypoechoic left thyroid nodule. Specimens were placed in specialized containers from [facility]. She tolerated it well.         Signed  Shannon Kim MD FACS

## 2023-09-29 ENCOUNTER — TELEPHONE (OUTPATIENT)
Age: 78
End: 2023-09-29

## 2023-09-29 NOTE — TELEPHONE ENCOUNTER
Patient called said she received a call from Nathen Ríos and she was just returning her call. Call back number is [phone].

## 2023-09-29 NOTE — TELEPHONE ENCOUNTER
Core bx of thyroid is again non diagnostic    Options   Repeat  Observation with repeat US in 6 months  Neck exploration and left thyroid lobectomy possible total thyroidectomy    Left message      Rocio Mendez MD FACS

## 2023-10-03 ENCOUNTER — OFFICE VISIT (OUTPATIENT)
Age: 78
End: 2023-10-03
Payer: MEDICARE

## 2023-10-03 VITALS
TEMPERATURE: 98 F | BODY MASS INDEX: 39.12 KG/M2 | RESPIRATION RATE: 14 BRPM | DIASTOLIC BLOOD PRESSURE: 81 MMHG | HEIGHT: 63 IN | WEIGHT: 220.8 LBS | SYSTOLIC BLOOD PRESSURE: 138 MMHG | HEART RATE: 67 BPM | OXYGEN SATURATION: 94 %

## 2023-10-03 DIAGNOSIS — E83.52 HYPERCALCEMIA: ICD-10-CM

## 2023-10-03 DIAGNOSIS — E04.1 LEFT THYROID NODULE: Primary | ICD-10-CM

## 2023-10-03 PROCEDURE — 1090F PRES/ABSN URINE INCON ASSESS: CPT | Performed by: SURGERY

## 2023-10-03 PROCEDURE — G8427 DOCREV CUR MEDS BY ELIG CLIN: HCPCS | Performed by: SURGERY

## 2023-10-03 PROCEDURE — 1123F ACP DISCUSS/DSCN MKR DOCD: CPT | Performed by: SURGERY

## 2023-10-03 PROCEDURE — G8484 FLU IMMUNIZE NO ADMIN: HCPCS | Performed by: SURGERY

## 2023-10-03 PROCEDURE — G8417 CALC BMI ABV UP PARAM F/U: HCPCS | Performed by: SURGERY

## 2023-10-03 PROCEDURE — 1036F TOBACCO NON-USER: CPT | Performed by: SURGERY

## 2023-10-03 PROCEDURE — 99214 OFFICE O/P EST MOD 30 MIN: CPT | Performed by: SURGERY

## 2023-10-03 PROCEDURE — G8400 PT W/DXA NO RESULTS DOC: HCPCS | Performed by: SURGERY

## 2023-10-03 ASSESSMENT — ENCOUNTER SYMPTOMS
CONSTIPATION: 0
COUGH: 0
ABDOMINAL PAIN: 0
SHORTNESS OF BREATH: 0
VOMITING: 0
BACK PAIN: 0
BLOOD IN STOOL: 0
DIARRHEA: 0
WHEEZING: 0
EYE PAIN: 0
NAUSEA: 0
STRIDOR: 0
SORE THROAT: 0

## 2023-10-03 ASSESSMENT — PATIENT HEALTH QUESTIONNAIRE - PHQ9
SUM OF ALL RESPONSES TO PHQ QUESTIONS 1-9: 0
1. LITTLE INTEREST OR PLEASURE IN DOING THINGS: 0
SUM OF ALL RESPONSES TO PHQ QUESTIONS 1-9: 0
SUM OF ALL RESPONSES TO PHQ QUESTIONS 1-9: 0
2. FEELING DOWN, DEPRESSED OR HOPELESS: 0
SUM OF ALL RESPONSES TO PHQ9 QUESTIONS 1 & 2: 0
SUM OF ALL RESPONSES TO PHQ QUESTIONS 1-9: 0

## 2023-10-03 NOTE — PROGRESS NOTES
Subjective:      Patient ID: Layton Goltz is a 69 y.o. female who comes in for follow up by Sandra Villanueva MD for a elevated calcium and a left thyroid nodule      Chief Complaint   Patient presents with    Follow-up     Follow up to discuss FNA biopsy results. HPI  She has been noted to have an elevated calcium for over 2 years. She was taking calcium and stopped it but hypercalcemia persisted. Most recent labs 6/2/2023 had an intact PTH of 47 and calcium of 11.1 with albumin of 5.0. She has known osteoporosis but no fractures, bone pain, kidney stones. She reports some mild dysphagia but denies voice changes or palpitations, exposure to radiation, unexplained weight loss. .  She had a neck US and NM parathyroid scan which were normal except for a 4.8 cm left thyroid nodule. I saw her 7/2023 and did a 24 urine calcium that was fine. CT neck noted the left 3.5 x 3.1 x 4.9 cm thyroid nodule with right tracheal deviation and an 8 mm lung density but did not identify any enlarged parathyroids. I attempted FNA x 2 of the thyroid nodule and they were non diagnostic.       Past Medical History:   Diagnosis Date    Diabetes ([address])     Heart failure ([address])     Hyperlipidemia     Hypertension     Osteoarthritis      Past Surgical History:   Procedure Laterality Date    BREAST BIOPSY Left     40 yrs ago--neg    CATARACT REMOVAL Bilateral     COLONOSCOPY N/A 9/30/2022    COLONOSCOPY performed by Kamila Barraza MD at [facility] ENDOSCOPY    COLONOSCOPY N/A 9/23/2020    COLONOSCOPY performed by Prabhu Castillo MD at [facility]  9/23/2020         HEENT      upper lid lift    ORTHOPEDIC SURGERY Bilateral     carpal tunnel release    OTHER SURGICAL HISTORY      colonoscopy     Family History   Problem Relation Age of Onset    Diabetes Mother     Hypertension Mother     Hypertension Father     Other Father         cerebral aneurysm    Diabetes Sister     Diabetes Brother     Lung

## 2023-10-05 DIAGNOSIS — E04.1 LEFT THYROID NODULE: Primary | ICD-10-CM

## 2023-10-05 DIAGNOSIS — E04.1 LEFT THYROID NODULE: ICD-10-CM

## 2023-10-25 LAB
HBA1C MFR BLD HPLC: 6.5 %
LDL CHOLESTEROL, EXTERNAL: 86

## 2023-10-31 ENCOUNTER — OFFICE VISIT (OUTPATIENT)
Age: 78
End: 2023-10-31
Payer: MEDICARE

## 2023-10-31 VITALS
WEIGHT: 222.4 LBS | SYSTOLIC BLOOD PRESSURE: 150 MMHG | HEIGHT: 63 IN | DIASTOLIC BLOOD PRESSURE: 52 MMHG | BODY MASS INDEX: 39.41 KG/M2 | HEART RATE: 73 BPM

## 2023-10-31 DIAGNOSIS — E21.3 HYPERPARATHYROIDISM (HCC): Primary | ICD-10-CM

## 2023-10-31 DIAGNOSIS — E21.3 HYPERPARATHYROIDISM (HCC): ICD-10-CM

## 2023-10-31 DIAGNOSIS — E83.52 HYPERCALCEMIA: ICD-10-CM

## 2023-10-31 LAB
25(OH)D3 SERPL-MCNC: 23.7 NG/ML (ref 30–100)
ALBUMIN SERPL-MCNC: 4.3 G/DL (ref 3.5–5)
ANION GAP SERPL CALC-SCNC: 8 MMOL/L (ref 5–15)
BUN SERPL-MCNC: 20 MG/DL (ref 6–20)
BUN/CREAT SERPL: 22 (ref 12–20)
CALCIUM SERPL-MCNC: 10.5 MG/DL (ref 8.5–10.1)
CALCIUM SERPL-MCNC: 10.6 MG/DL (ref 8.5–10.1)
CHLORIDE SERPL-SCNC: 102 MMOL/L (ref 97–108)
CO2 SERPL-SCNC: 28 MMOL/L (ref 21–32)
CREAT SERPL-MCNC: 0.93 MG/DL (ref 0.55–1.02)
GLUCOSE SERPL-MCNC: 145 MG/DL (ref 65–100)
MAGNESIUM SERPL-MCNC: 1.9 MG/DL (ref 1.6–2.4)
PHOSPHATE SERPL-MCNC: 3.1 MG/DL (ref 2.6–4.7)
PHOSPHATE SERPL-MCNC: 3.2 MG/DL (ref 2.6–4.7)
POTASSIUM SERPL-SCNC: 4.4 MMOL/L (ref 3.5–5.1)
SODIUM SERPL-SCNC: 138 MMOL/L (ref 136–145)

## 2023-10-31 PROCEDURE — G8417 CALC BMI ABV UP PARAM F/U: HCPCS | Performed by: INTERNAL MEDICINE

## 2023-10-31 PROCEDURE — 1036F TOBACCO NON-USER: CPT | Performed by: INTERNAL MEDICINE

## 2023-10-31 PROCEDURE — G8484 FLU IMMUNIZE NO ADMIN: HCPCS | Performed by: INTERNAL MEDICINE

## 2023-10-31 PROCEDURE — 99205 OFFICE O/P NEW HI 60 MIN: CPT | Performed by: INTERNAL MEDICINE

## 2023-10-31 PROCEDURE — 1090F PRES/ABSN URINE INCON ASSESS: CPT | Performed by: INTERNAL MEDICINE

## 2023-10-31 PROCEDURE — G8428 CUR MEDS NOT DOCUMENT: HCPCS | Performed by: INTERNAL MEDICINE

## 2023-10-31 PROCEDURE — G8400 PT W/DXA NO RESULTS DOC: HCPCS | Performed by: INTERNAL MEDICINE

## 2023-10-31 PROCEDURE — 1123F ACP DISCUSS/DSCN MKR DOCD: CPT | Performed by: INTERNAL MEDICINE

## 2023-10-31 RX ORDER — FERROUS SULFATE 325(65) MG
325 TABLET ORAL EVERY OTHER DAY
COMMUNITY

## 2023-10-31 NOTE — PROGRESS NOTES
stare  Neck: supple, + left sided thyroid nodule, no LAD, no carotid bruits, no supraclavicular or dorsocervical fat pads  Cardiovascular: regular, normal rate, normal S1 and S2, no murmurs/rubs/gallops, 2+ dorsalis pedis pulses bilaterally  Respiratory: clear to auscultation bilaterally  Gastrointestinal: soft, nontender, nondistended, no masses, no hepatosplenomegaly  Musculoskeletal: no proximal muscle weakness in upper or lower extremities  Integumentary: no acanthosis nigricans, no rashes, no edema,   Neurological: reflexes 2+ at biceps, no tremor  Psychiatric: normal mood and affect    Data Reviewed:   Ca 11.2  PTH 53  BUN/Cr 17/0.99    INDICATION: Primary hyperparathyroidism      EXAM: US Neck Head Soft Tissue     COMPARISON: NM Parathyroid Imaging     FINDINGS:   There is no sonographically apparent enlarged parathyroid. Left thyroid lobe is enlarged by a nodule measuring 4.8 x 3.8 x 3.6 cm. Right thyroid lobe is uniform. Right lobe measures 4.1 x 1.5 x 1.5 cm. Left lobe measures 6.8 x 3.8 x 3.6 cm. IMPRESSION:  1. No sonographically apparent parathyroid adenoma. 2. 4.8 cm solitary thyroid nodule. Narrative & Impression  EXAM: Nuclear Parathyroid Scan. INDICATION:  Primary hyperparathyroidism     COMPARISON: Parathyroid Sonogram     TECHNIQUE: Planar scintigraphic imaging of the neck/chest is obtained in 3  projections immediately following uneventful IV administration of 25.4 mCi  technetium 99m sestamibi. Two-hour delayed images are also obtained. FINDINGS:   There is greater activity in the left lower lobe compared to the right  correlating with nodular left thyroid enlargement. Delayed images show no focal  retention of radiotracer--no scintigraphically apparent parathyroid adenoma. IMPRESSION:  No scintigraphically apparent parathyroid adenoma. Assessment/Plan:   1. Hyperparathyroidism ([address])    2.  Hypercalcemia    1) We discussed at length the Parathyroid-Ca Axis, and the General Sunscreen Counseling: I recommended a broad spectrum sunscreen with a SPF of 30 or higher. I explained that SPF 30 sunscreens block approximately 97 percent of the sun's harmful rays. Sunscreens should be applied at least 15 minutes prior to expected sun exposure and then every 2 hours after that as long as sun exposure continues. If swimming or exercising sunscreen should be reapplied every 45 minutes to an hour after getting wet or sweating. One ounce, or the equivalent of a shot glass full of sunscreen, is adequate to protect the skin not covered by a bathing suit. I also recommended a lip balm with a sunscreen as well. Sun protective clothing can be used in lieu of sunscreen but must be worn the entire time you are exposed to the sun's rays. Detail Level: Detailed

## 2023-11-01 LAB
CALCIUM SERPL-MCNC: 10.1 MG/DL (ref 8.5–10.1)
PTH-INTACT SERPL-MCNC: 80.4 PG/ML (ref 18.4–88)

## 2023-11-02 LAB — 1,25(OH)2D3 SERPL-MCNC: 32.2 PG/ML (ref 24.8–81.5)

## 2023-11-03 ENCOUNTER — HOSPITAL ENCOUNTER (OUTPATIENT)
Facility: HOSPITAL | Age: 78
End: 2023-11-03
Attending: INTERNAL MEDICINE
Payer: MEDICARE

## 2023-11-03 DIAGNOSIS — R91.8 GROUND GLASS OPACITY PRESENT ON IMAGING OF LUNG: ICD-10-CM

## 2023-11-03 LAB
ALBUMIN MFR UR ELPH: 16 %
ALPHA1 GLOB MFR UR ELPH: 2.2 %
ALPHA2 GLOB 24H MFR UR ELPH: 8.1 %
B-GLOBULIN 24H MFR UR ELPH: 47.5 %
GAMMA GLOB 24H MFR UR ELPH: 26.2 %
LABORATORY COMMENT REPORT: NORMAL
M PROTEIN MFR UR ELPH: NORMAL %
PROT UR-MCNC: 4.7 MG/DL

## 2023-11-03 PROCEDURE — 71250 CT THORAX DX C-: CPT

## 2023-11-05 LAB
PTH RELATED PROT SERPL-SCNC: <2 PMOL/L
VIT A SERPL-MCNC: 69.7 UG/DL (ref 22–69.5)

## 2023-11-06 LAB
ALBUMIN SERPL ELPH-MCNC: 3.9 G/DL (ref 2.9–4.4)
ALBUMIN/GLOB SERPL: 1.3 (ref 0.7–1.7)
ALPHA1 GLOB SERPL ELPH-MCNC: 0.2 G/DL (ref 0–0.4)
ALPHA2 GLOB SERPL ELPH-MCNC: 0.9 G/DL (ref 0.4–1)
B-GLOBULIN SERPL ELPH-MCNC: 1 G/DL (ref 0.7–1.3)
GAMMA GLOB SERPL ELPH-MCNC: 0.9 G/DL (ref 0.4–1.8)
GLOBULIN SER CALC-MCNC: 3.1 G/DL (ref 2.2–3.9)
M PROTEIN SERPL ELPH-MCNC: NORMAL G/DL
PROT SERPL-MCNC: 7 G/DL (ref 6–8.5)

## 2024-03-01 ENCOUNTER — OFFICE VISIT (OUTPATIENT)
Age: 79
End: 2024-03-01

## 2024-03-01 VITALS
DIASTOLIC BLOOD PRESSURE: 64 MMHG | WEIGHT: 224.4 LBS | SYSTOLIC BLOOD PRESSURE: 125 MMHG | HEART RATE: 72 BPM | BODY MASS INDEX: 39.76 KG/M2 | HEIGHT: 63 IN

## 2024-03-01 DIAGNOSIS — E21.3 HYPERPARATHYROIDISM (HCC): Primary | ICD-10-CM

## 2024-03-01 DIAGNOSIS — E83.52 HYPERCALCEMIA: ICD-10-CM

## 2024-03-01 NOTE — PROGRESS NOTES
Chief Complaint   Patient presents with    parathyroid problem    Thyroid Problem     Pcp and pharmacy verified     History of Present Illness: Vero Rosenberg is a 78 y.o. female here for follow up of hyperparathyroidism and hypercalcemia.   Pt was referred to Dr. Javon Barros for the same, as well as a left sided thyroid nodule.    \"Dr. Orozco said I had high calcium for awhile, but because the Ca levels were not getting better she sent me to see Dr. Barros and he sent me for a parathyroid scan, which did not show anything off, but the CT scan did show a thyroid nodule and Dr. Barros did the biopsy and it can back benign. Since my Ca levels have not improved Dr. Barros and Dr. Orozco recommended I come see you.\"    As part or his work up Dr. Barros did a 24 hour urine Ca and her 24 hour urine Ca was 40.    Since the labs Dr. Barros and Dr. Orozco did fit with hyperparathyroidism, I wanted to rule out any non-PTH related causes of hypercalcemia.  In October 2023 her Ca was 10.5 with Alb 3.9, she SPEP and UPEP were negative, her PTH was 80.4, her Vitamin D was 23.7 and her 1,25-OH Vitamin D was not elevated at 32.2.  Her Vitamin A was high at 69.7.  Her PTHrP was negative.    I did not feel she was a candidate for any therapies at that time.    Pt denies any recent illnesses, injuries or hospitalizations.    She denies any hx of falls or fractures since our last visit.  She denies any hematuria, dysuria, or renal stones.  She denies any issues of AMS, confusion or mood swings.    Pt notes she has hx of osteopenia and was treated with Fosamax for about 2-3 years and \"the DXA improved so Dr. Orozco stopped the fosamax. I have been off the Fosamax for about 3 years and I had a DXA in October 2023 and Dr. Bradley said things looked good. I do not have osteoporosis.\"    She is still seeing Dr. Young of Pulmonary. \"I was told years ago that I have scarring on my left lung and the CT I just had

## 2024-03-02 LAB
25(OH)D3 SERPL-MCNC: 20.5 NG/ML (ref 30–100)
ALBUMIN SERPL-MCNC: 3.9 G/DL (ref 3.5–5)
ANION GAP SERPL CALC-SCNC: 7 MMOL/L (ref 5–15)
BUN SERPL-MCNC: 25 MG/DL (ref 6–20)
BUN/CREAT SERPL: 27 (ref 12–20)
CALCIUM SERPL-MCNC: 10.3 MG/DL (ref 8.5–10.1)
CALCIUM SERPL-MCNC: 10.5 MG/DL (ref 8.5–10.1)
CHLORIDE SERPL-SCNC: 106 MMOL/L (ref 97–108)
CO2 SERPL-SCNC: 27 MMOL/L (ref 21–32)
CREAT SERPL-MCNC: 0.91 MG/DL (ref 0.55–1.02)
GLUCOSE SERPL-MCNC: 139 MG/DL (ref 65–100)
PHOSPHATE SERPL-MCNC: 2.9 MG/DL (ref 2.6–4.7)
POTASSIUM SERPL-SCNC: 4.4 MMOL/L (ref 3.5–5.1)
PTH-INTACT SERPL-MCNC: 86.8 PG/ML (ref 18.4–88)
SODIUM SERPL-SCNC: 140 MMOL/L (ref 136–145)

## 2024-03-04 DIAGNOSIS — E83.52 HYPERCALCEMIA: ICD-10-CM

## 2024-03-04 DIAGNOSIS — E21.3 HYPERPARATHYROIDISM (HCC): Primary | ICD-10-CM

## 2024-03-04 LAB — 1,25(OH)2D3 SERPL-MCNC: 22.7 PG/ML (ref 24.8–81.5)

## 2024-04-03 ENCOUNTER — HOSPITAL ENCOUNTER (OUTPATIENT)
Facility: HOSPITAL | Age: 79
Discharge: HOME | End: 2024-04-06
Attending: SURGERY
Payer: MEDICARE

## 2024-04-03 DIAGNOSIS — E04.1 LEFT THYROID NODULE: ICD-10-CM

## 2024-04-03 PROCEDURE — 76536 US EXAM OF HEAD AND NECK: CPT

## 2024-04-11 ENCOUNTER — TELEPHONE (OUTPATIENT)
Age: 79
End: 2024-04-11

## 2024-04-11 NOTE — TELEPHONE ENCOUNTER
----- Message from Javon Barros MD sent at 4/9/2024  9:33 AM EDT -----  Regarding: US  Please let them know US is unchanged  Needs office follow up still    Javon Barros MD FACS    ----- Message -----  From: Ibrahima Voss Incoming Orders Results To [facility]  Sent: 4/8/2024  11:27 AM EDT  To: Javon Barros MD

## 2024-04-29 ENCOUNTER — OFFICE VISIT (OUTPATIENT)
Age: 79
End: 2024-04-29
Payer: MEDICARE

## 2024-04-29 VITALS
OXYGEN SATURATION: 92 % | HEIGHT: 63 IN | DIASTOLIC BLOOD PRESSURE: 77 MMHG | BODY MASS INDEX: 38.59 KG/M2 | RESPIRATION RATE: 16 BRPM | WEIGHT: 217.8 LBS | SYSTOLIC BLOOD PRESSURE: 129 MMHG | HEART RATE: 68 BPM | TEMPERATURE: 98.1 F

## 2024-04-29 DIAGNOSIS — E21.3 HYPERPARATHYROIDISM (HCC): ICD-10-CM

## 2024-04-29 DIAGNOSIS — E04.1 LEFT THYROID NODULE: Primary | ICD-10-CM

## 2024-04-29 PROCEDURE — 1090F PRES/ABSN URINE INCON ASSESS: CPT | Performed by: SURGERY

## 2024-04-29 PROCEDURE — G8417 CALC BMI ABV UP PARAM F/U: HCPCS | Performed by: SURGERY

## 2024-04-29 PROCEDURE — 1036F TOBACCO NON-USER: CPT | Performed by: SURGERY

## 2024-04-29 PROCEDURE — G8400 PT W/DXA NO RESULTS DOC: HCPCS | Performed by: SURGERY

## 2024-04-29 PROCEDURE — 1123F ACP DISCUSS/DSCN MKR DOCD: CPT | Performed by: SURGERY

## 2024-04-29 PROCEDURE — 99213 OFFICE O/P EST LOW 20 MIN: CPT | Performed by: SURGERY

## 2024-04-29 PROCEDURE — G8427 DOCREV CUR MEDS BY ELIG CLIN: HCPCS | Performed by: SURGERY

## 2024-04-29 RX ORDER — NEOMYCIN SULFATE, POLYMYXIN B SULFATE, AND DEXAMETHASONE 3.5; 10000; 1 MG/G; [USP'U]/G; MG/G
1 OINTMENT OPHTHALMIC DAILY PRN
COMMUNITY
Start: 2024-02-27

## 2024-04-29 RX ORDER — NYSTATIN 100000 U/G
1 CREAM TOPICAL 2 TIMES DAILY PRN
COMMUNITY
Start: 2024-04-07

## 2024-04-29 ASSESSMENT — PATIENT HEALTH QUESTIONNAIRE - PHQ9
SUM OF ALL RESPONSES TO PHQ QUESTIONS 1-9: 0
2. FEELING DOWN, DEPRESSED OR HOPELESS: NOT AT ALL
1. LITTLE INTEREST OR PLEASURE IN DOING THINGS: NOT AT ALL
SUM OF ALL RESPONSES TO PHQ QUESTIONS 1-9: 0
SUM OF ALL RESPONSES TO PHQ9 QUESTIONS 1 & 2: 0
SUM OF ALL RESPONSES TO PHQ QUESTIONS 1-9: 0
SUM OF ALL RESPONSES TO PHQ QUESTIONS 1-9: 0

## 2024-04-29 ASSESSMENT — ENCOUNTER SYMPTOMS
STRIDOR: 0
COUGH: 0
BLOOD IN STOOL: 0
SHORTNESS OF BREATH: 0
BACK PAIN: 0
CONSTIPATION: 0
EYE PAIN: 0
DIARRHEA: 0
SORE THROAT: 0
VOMITING: 0
WHEEZING: 0
ABDOMINAL PAIN: 0
NAUSEA: 0

## 2024-04-29 NOTE — PROGRESS NOTES
Identified pt with two pt identifiers (name and ). Reviewed chart in preparation for visit and have obtained necessary documentation.    Vero Rosenberg is a 78 y.o. female  Chief Complaint   Patient presents with    Follow-up     Reevaluation of elevated calcium and Lt thyroid nodule     /77 (Site: Left Upper Arm, Position: Sitting)   Pulse 68   Temp 98.1 °F (36.7 °C) (Oral)   Resp 16   Ht 1.6 m (5' 3\")   Wt 98.8 kg (217 lb 12.8 oz)   SpO2 92%   BMI 38.58 kg/m²     1. Have you been to the ER, urgent care clinic since your last visit?  Hospitalized since your last visit?no    2. Have you seen or consulted any other health care providers outside of the [facility] since your last visit?  Include any pap smears or colon screening. no   
primary hyperparathyroidism, it may also be FHH or from other dx.   DEXA scan is normal  Dominant 4.8 cm left thyroid nodule.  I explained about the anatomy and pathophysiology of thyroid nodules/disease.  I explained about possible malignancy.  I discussed repeat FNA, observation, possible thyroid suppression pending FNA, and total thyroidectomy.  Risks of surgery include bleeding (potentially requiring emergent exploration at bedside), infection, parathyroid injury/removal requiring supplemental calcium +/- vit d, recurrent laryngeal/superior laryngeal nerve injury resulting in vocal cord paralysis, voice changes and fatigue, aspiration, further surgery, need for lifelong thyroid supplementation.  NIDDM type 2 controlled with amaryl and metformin  Hgb A1c 6.5  Anemia.  Hgb 11.3 on iron  CAD NSTEMI 10/2022  Abnormal CT neck with lung nodule.  3 month follow up CT was recommended.  She has follow up with Dr Marvin Young in September and will defer to him for follow up    At this point she prefers ongoing observation    US 1 year    RTC 1 year (after testing)        Javon Barros MD FACS

## 2024-06-17 ENCOUNTER — HOSPITAL ENCOUNTER (OUTPATIENT)
Facility: HOSPITAL | Age: 79
Discharge: HOME | End: 2024-06-20
Attending: FAMILY MEDICINE
Payer: MEDICARE

## 2024-06-17 VITALS — HEIGHT: 63 IN | BODY MASS INDEX: 38.45 KG/M2 | WEIGHT: 217 LBS

## 2024-06-17 DIAGNOSIS — Z12.31 VISIT FOR SCREENING MAMMOGRAM: ICD-10-CM

## 2024-06-17 PROCEDURE — 77067 SCR MAMMO BI INCL CAD: CPT

## 2024-06-24 LAB
CALCIUM 24H UR-MRATE: 102 MG/24 HR (ref 142–353)
COLLECT DURATION TIME UR: 24 HR
SPECIMEN VOL 24H UR: 2000 ML

## 2024-09-01 DIAGNOSIS — E83.52 HYPERCALCEMIA: ICD-10-CM

## 2024-09-01 DIAGNOSIS — E21.3 HYPERPARATHYROIDISM (HCC): ICD-10-CM

## 2024-09-03 ENCOUNTER — OFFICE VISIT (OUTPATIENT)
Age: 79
End: 2024-09-03
Payer: MEDICARE

## 2024-09-03 VITALS
HEIGHT: 63 IN | SYSTOLIC BLOOD PRESSURE: 136 MMHG | WEIGHT: 208 LBS | HEART RATE: 60 BPM | BODY MASS INDEX: 36.86 KG/M2 | DIASTOLIC BLOOD PRESSURE: 61 MMHG

## 2024-09-03 DIAGNOSIS — E04.1 LEFT THYROID NODULE: ICD-10-CM

## 2024-09-03 DIAGNOSIS — E21.3 HYPERPARATHYROIDISM (HCC): Primary | ICD-10-CM

## 2024-09-03 DIAGNOSIS — E83.52 HYPERCALCEMIA: ICD-10-CM

## 2024-09-03 LAB
ALBUMIN SERPL-MCNC: 4.5 G/DL (ref 3.5–5)
ANION GAP SERPL CALC-SCNC: 5 MMOL/L (ref 5–15)
BUN SERPL-MCNC: 21 MG/DL (ref 6–20)
BUN/CREAT SERPL: 22 (ref 12–20)
CALCIUM SERPL-MCNC: 10.8 MG/DL (ref 8.5–10.1)
CALCIUM SERPL-MCNC: 11.1 MG/DL (ref 8.5–10.1)
CHLORIDE SERPL-SCNC: 103 MMOL/L (ref 97–108)
CO2 SERPL-SCNC: 29 MMOL/L (ref 21–32)
CREAT SERPL-MCNC: 0.95 MG/DL (ref 0.55–1.02)
GLUCOSE SERPL-MCNC: 121 MG/DL (ref 65–100)
PHOSPHATE SERPL-MCNC: 3.6 MG/DL (ref 2.6–4.7)
PHOSPHATE SERPL-MCNC: 3.7 MG/DL (ref 2.6–4.7)
POTASSIUM SERPL-SCNC: 4.7 MMOL/L (ref 3.5–5.1)
PTH-INTACT SERPL-MCNC: 98.7 PG/ML (ref 18.4–88)
SODIUM SERPL-SCNC: 137 MMOL/L (ref 136–145)

## 2024-09-03 PROCEDURE — 1036F TOBACCO NON-USER: CPT | Performed by: INTERNAL MEDICINE

## 2024-09-03 PROCEDURE — 99214 OFFICE O/P EST MOD 30 MIN: CPT | Performed by: INTERNAL MEDICINE

## 2024-09-03 PROCEDURE — G8400 PT W/DXA NO RESULTS DOC: HCPCS | Performed by: INTERNAL MEDICINE

## 2024-09-03 PROCEDURE — 1123F ACP DISCUSS/DSCN MKR DOCD: CPT | Performed by: INTERNAL MEDICINE

## 2024-09-03 PROCEDURE — G8427 DOCREV CUR MEDS BY ELIG CLIN: HCPCS | Performed by: INTERNAL MEDICINE

## 2024-09-03 PROCEDURE — 1090F PRES/ABSN URINE INCON ASSESS: CPT | Performed by: INTERNAL MEDICINE

## 2024-09-03 PROCEDURE — G8417 CALC BMI ABV UP PARAM F/U: HCPCS | Performed by: INTERNAL MEDICINE

## 2024-09-03 PROCEDURE — G2211 COMPLEX E/M VISIT ADD ON: HCPCS | Performed by: INTERNAL MEDICINE

## 2024-09-03 NOTE — PROGRESS NOTES
Dr. Orozco stopped the fosamax. I have been off the Fosamax for about 3 years and I had a DXA in October 2023 and Dr. Bradley said things looked good. I do not have osteoporosis.\"    She is still seeing Dr. Young of Pulmonary. \"I was told years ago that I have scarring on my left lung and the CT I just had showed the spot on my left lung. I am following with Dr. Young of Pul and I had a CT in November 2023 and he said everything was looking good. \"    Pt has hx of GERD, but she notes she takes Pepcid. She does not take any Tums or antacids.    She is not taking any Vitamin D, or Ca supplements  She does take an iron supplement, but she is not taking any other vitamin supplementations.  Pt has no hx of cancer.    Her brother has hx of renal stones, and lung cancer. \"He was a smoker and worked with asbestos.\"    No known family hx of calcium issues.    Current Outpatient Medications   Medication Sig    neomycin-polymyxin-dexameth 3.5-15076-0.1 OINT Place 1 drop into both eyes daily as needed (irritation)    ferrous sulfate (IRON 325) 325 (65 Fe) MG tablet Take 1 tablet by mouth every other day    blood glucose test strips (ASCENSIA AUTODISC VI;ONE TOUCH ULTRA TEST VI) strip Accu-Chek Guide test strips   USE 1 STRIP TO CHECK GLUCOSE ONCE DAILY    famotidine (PEPCID) 40 MG tablet Take 1 tablet by mouth daily as needed    glimepiride (AMARYL) 2 MG tablet Take 1 tablet by mouth every morning    aspirin 81 MG chewable tablet Take 1 tablet by mouth daily    carvedilol (COREG) 3.125 MG tablet Take 1 tablet by mouth 2 times daily (with meals)    empagliflozin (JARDIANCE) 10 MG tablet Take 1 tablet by mouth daily    furosemide (LASIX) 20 MG tablet Take 1 tablet by mouth as needed    lovastatin (MEVACOR) 20 MG tablet Take 1 tablet by mouth nightly    metFORMIN (GLUCOPHAGE) 1000 MG tablet Take 1 tablet by mouth 2 times daily (with meals)    sacubitril-valsartan (ENTRESTO) 24-26 MG per tablet Take 1 tablet by mouth in the

## 2024-09-04 DIAGNOSIS — E04.1 LEFT THYROID NODULE: ICD-10-CM

## 2024-09-04 DIAGNOSIS — E83.52 HYPERCALCEMIA: ICD-10-CM

## 2024-09-04 DIAGNOSIS — E21.3 HYPERPARATHYROIDISM (HCC): Primary | ICD-10-CM

## 2024-09-04 LAB — 1,25(OH)2D3 SERPL-MCNC: 33.5 PG/ML (ref 24.8–81.5)

## 2024-11-04 ENCOUNTER — HOSPITAL ENCOUNTER (OUTPATIENT)
Facility: HOSPITAL | Age: 79
Discharge: HOME | End: 2024-11-07
Attending: INTERNAL MEDICINE
Payer: MEDICARE

## 2024-11-04 DIAGNOSIS — R91.1 PULMONARY NODULE: ICD-10-CM

## 2024-11-04 PROCEDURE — 71250 CT THORAX DX C-: CPT

## 2024-11-19 ENCOUNTER — TRANSCRIBE ORDERS (OUTPATIENT)
Facility: HOSPITAL | Age: 79
End: 2024-11-19

## 2024-11-19 DIAGNOSIS — Z87.891 PERSONAL HISTORY OF TOBACCO USE, PRESENTING HAZARDS TO HEALTH: Primary | ICD-10-CM

## 2024-11-19 DIAGNOSIS — F17.211 CIGARETTE NICOTINE DEPENDENCE IN REMISSION: ICD-10-CM

## 2025-02-25 ENCOUNTER — HOSPITAL ENCOUNTER (EMERGENCY)
Facility: HOSPITAL | Age: 80
Discharge: HOME | End: 2025-02-25
Payer: MEDICARE

## 2025-02-25 ENCOUNTER — APPOINTMENT (OUTPATIENT)
Facility: HOSPITAL | Age: 80
End: 2025-02-25
Payer: MEDICARE

## 2025-02-25 VITALS
HEART RATE: 65 BPM | RESPIRATION RATE: 15 BRPM | SYSTOLIC BLOOD PRESSURE: 158 MMHG | DIASTOLIC BLOOD PRESSURE: 83 MMHG | OXYGEN SATURATION: 97 % | TEMPERATURE: 98.1 F

## 2025-02-25 DIAGNOSIS — S40.021A ARM CONTUSION, RIGHT, INITIAL ENCOUNTER: Primary | ICD-10-CM

## 2025-02-25 DIAGNOSIS — R03.0 ELEVATED BLOOD PRESSURE READING: ICD-10-CM

## 2025-02-25 PROCEDURE — 99283 EMERGENCY DEPT VISIT LOW MDM: CPT

## 2025-02-25 PROCEDURE — 73060 X-RAY EXAM OF HUMERUS: CPT

## 2025-02-25 ASSESSMENT — PAIN DESCRIPTION - ORIENTATION: ORIENTATION: RIGHT

## 2025-02-25 ASSESSMENT — PAIN - FUNCTIONAL ASSESSMENT: PAIN_FUNCTIONAL_ASSESSMENT: 0-10

## 2025-02-25 ASSESSMENT — PAIN SCALES - GENERAL: PAINLEVEL_OUTOF10: 6

## 2025-02-25 ASSESSMENT — PAIN DESCRIPTION - LOCATION: LOCATION: ARM

## 2025-02-25 NOTE — ED PROVIDER NOTES
COLONOSCOPY,REMNANCY MURO,SNARE  2020         HEENT      upper lid lift    ORTHOPEDIC SURGERY Bilateral     carpal tunnel release    OTHER SURGICAL HISTORY      colonoscopy       Family History:  Family History   Problem Relation Age of Onset    Diabetes Mother     Hypertension Mother     Heart Disease Mother     Hypertension Father     Other Father         cerebral aneurysm    Alcohol Abuse Father     Lung Disease Sister         Complications from COVID    Diabetes Sister     Kidney stones Brother     Lung Cancer Brother     Diabetes Brother     No Known Problems Brother         MVA    Diabetes Brother     Hypertension Brother     Heart Attack Brother     Hypertension Brother        Social History:  Social History     Tobacco Use    Smoking status: Former     Current packs/day: 0.00     Average packs/day: 1 pack/day for 35.0 years (35.0 ttl pk-yrs)     Types: Cigarettes     Start date: 1963     Quit date: 1998     Years since quittin.8    Smokeless tobacco: Never   Vaping Use    Vaping status: Never Used   Substance Use Topics    Alcohol use: Never    Drug use: Never       Allergies:  Allergies   Allergen Reactions    Sulfamethoxazole-Trimethoprim Hives and Itching           PHYSICAL EXAM      ED Triage Vitals [25 1302]   Encounter Vitals Group      BP (!) 179/62      Systolic BP Percentile       Diastolic BP Percentile       Pulse 66      Respirations 18      Temp 98.1 °F (36.7 °C)      Temp src       SpO2 95 %      Weight       Height       Head Circumference       Peak Flow       Pain Score       Pain Loc       Pain Education       Exclude from Growth Chart         Physical Exam  Constitutional:       Appearance: Normal appearance.   HENT:      Head: Normocephalic and atraumatic.   Cardiovascular:      Rate and Rhythm: Normal rate.      Pulses: Normal pulses.   Pulmonary:      Effort: Pulmonary effort is normal.   Abdominal:      General: There is no distension.   Musculoskeletal:

## 2025-02-25 NOTE — DISCHARGE INSTRUCTIONS
Thank You!    It was a pleasure taking care of you in our Emergency Department today. We know that when you come to [facility], you are entrusting us with your health, comfort, and safety. Our clinicians honor that trust, and truly appreciate the opportunity to care for you and your loved ones.    If you receive a survey about your Emergency Department experience today, please fill it out.  We value your feedback. Thank you.      Kenya Barrera PA-C    ___________________________________  I have included a copy of your lab results and/or radiologic studies from today's visit so you can have them easily available at your follow-up visit.   No results found for this or any previous visit (from the past 12 hour(s)).    XR HUMERUS RIGHT (MIN 2 VIEWS)   Final Result   No acute abnormality.      Electronically signed by ROSARIO CAIN        [unfilled]

## 2025-02-28 LAB
1,25(OH)2D SERPL-MCNC: 29.6 PG/ML (ref 24.8–81.5)
25(OH)D3+25(OH)D2 SERPL-MCNC: 21 NG/ML (ref 30–100)
ALBUMIN SERPL-MCNC: 4.5 G/DL (ref 3.8–4.8)
BUN SERPL-MCNC: 21 MG/DL (ref 8–27)
BUN/CREAT SERPL: 19 (ref 12–28)
CALCIUM SERPL-MCNC: 10.6 MG/DL (ref 8.7–10.3)
CHLORIDE SERPL-SCNC: 102 MMOL/L (ref 96–106)
CO2 SERPL-SCNC: 21 MMOL/L (ref 20–29)
CREAT SERPL-MCNC: 1.08 MG/DL (ref 0.57–1)
EGFRCR SERPLBLD CKD-EPI 2021: 53 ML/MIN/1.73
GLUCOSE SERPL-MCNC: 137 MG/DL (ref 70–99)
PHOSPHATE SERPL-MCNC: 3.4 MG/DL (ref 3–4.3)
POTASSIUM SERPL-SCNC: 4.3 MMOL/L (ref 3.5–5.2)
SODIUM SERPL-SCNC: 141 MMOL/L (ref 134–144)

## 2025-03-01 ENCOUNTER — HOSPITAL ENCOUNTER (OUTPATIENT)
Facility: HOSPITAL | Age: 80
End: 2025-03-01
Attending: INTERNAL MEDICINE
Payer: MEDICARE

## 2025-03-01 DIAGNOSIS — E04.1 LEFT THYROID NODULE: ICD-10-CM

## 2025-03-01 DIAGNOSIS — E21.3 HYPERPARATHYROIDISM: ICD-10-CM

## 2025-03-01 DIAGNOSIS — E83.52 HYPERCALCEMIA: ICD-10-CM

## 2025-03-01 LAB
PTH-INTACT SERPL-MCNC: 69 PG/ML (ref 15–65)
REPORT: NORMAL

## 2025-03-01 PROCEDURE — 76536 US EXAM OF HEAD AND NECK: CPT

## 2025-03-03 ENCOUNTER — OFFICE VISIT (OUTPATIENT)
Age: 80
End: 2025-03-03
Payer: MEDICARE

## 2025-03-03 VITALS
SYSTOLIC BLOOD PRESSURE: 118 MMHG | BODY MASS INDEX: 38.2 KG/M2 | HEART RATE: 82 BPM | DIASTOLIC BLOOD PRESSURE: 66 MMHG | WEIGHT: 215.6 LBS | HEIGHT: 63 IN

## 2025-03-03 DIAGNOSIS — E83.52 HYPERCALCEMIA: ICD-10-CM

## 2025-03-03 DIAGNOSIS — E21.3 HYPERPARATHYROIDISM: Primary | ICD-10-CM

## 2025-03-03 DIAGNOSIS — E04.1 LEFT THYROID NODULE: ICD-10-CM

## 2025-03-03 PROCEDURE — 1126F AMNT PAIN NOTED NONE PRSNT: CPT | Performed by: INTERNAL MEDICINE

## 2025-03-03 PROCEDURE — 1090F PRES/ABSN URINE INCON ASSESS: CPT | Performed by: INTERNAL MEDICINE

## 2025-03-03 PROCEDURE — 1036F TOBACCO NON-USER: CPT | Performed by: INTERNAL MEDICINE

## 2025-03-03 PROCEDURE — G8417 CALC BMI ABV UP PARAM F/U: HCPCS | Performed by: INTERNAL MEDICINE

## 2025-03-03 PROCEDURE — 1159F MED LIST DOCD IN RCRD: CPT | Performed by: INTERNAL MEDICINE

## 2025-03-03 PROCEDURE — G2211 COMPLEX E/M VISIT ADD ON: HCPCS | Performed by: INTERNAL MEDICINE

## 2025-03-03 PROCEDURE — 1123F ACP DISCUSS/DSCN MKR DOCD: CPT | Performed by: INTERNAL MEDICINE

## 2025-03-03 PROCEDURE — 1160F RVW MEDS BY RX/DR IN RCRD: CPT | Performed by: INTERNAL MEDICINE

## 2025-03-03 PROCEDURE — G8400 PT W/DXA NO RESULTS DOC: HCPCS | Performed by: INTERNAL MEDICINE

## 2025-03-03 PROCEDURE — 99214 OFFICE O/P EST MOD 30 MIN: CPT | Performed by: INTERNAL MEDICINE

## 2025-03-03 PROCEDURE — G8427 DOCREV CUR MEDS BY ELIG CLIN: HCPCS | Performed by: INTERNAL MEDICINE

## 2025-03-03 NOTE — PROGRESS NOTES
Chief Complaint   Patient presents with    Parathyroid problem     Pcp and pharmacy verified     History of Present Illness: Vero Rosenberg is a 79 y.o. female here for follow up of hyperparathyroidism and hypercalcemia.   Pt was referred to Dr. Javon Barros for the same, as well as a left sided thyroid nodule.    \"Dr. Orozco said I had high calcium for awhile, but because the Ca levels were not getting better she sent me to see Dr. Barros and he sent me for a parathyroid scan, which did not show anything off, but the CT scan did show a thyroid nodule and Dr. Barros did the biopsy and it can back benign. Since my Ca levels have not improved Dr. Barros and Dr. Orozco recommended I come see you.\"    As part or his work up Dr. Barros did a 24 hour urine Ca and her 24 hour urine Ca was 40.    Since the labs Dr. Barros and Dr. Orozco did fit with hyperparathyroidism, I wanted to rule out any non-PTH related causes of hypercalcemia.  In October 2023 her Ca was 10.5 with Alb 3.9, she SPEP and UPEP were negative, her PTH was 80.4, her Vitamin D was 23.7 and her 1,25-OH Vitamin D was not elevated at 32.2.  Her Vitamin A was high at 69.7.  Her PTHrP was negative.    I did not feel she was a candidate for any therapies at that time.    She saw Dr. Barros again in April 2024 for re-evaluation of the hyperparathyroidism and evaluation of a thyroid nodule. He performed two FNAs, which were non-diagnostic. They discussed surgery vs watchful waiting. Dr. Barros did a 24 hour urine Ca collection. The collection showed Ca of 102, which is not elevated.    Pt denies any recent illnesses, injuries or hospitalizations.    She denies any hx of falls or fractures since our last visit.  She denies any hematuria, dysuria, or renal stones.  She denies any issues of AMS, confusion or mood swings.    Pt notes she has hx of osteopenia and was treated with Fosamax for about 2-3 years and \"the DXA improved so

## 2025-04-19 LAB — HBA1C MFR BLD HPLC: 6 %

## 2025-07-18 ENCOUNTER — HOSPITAL ENCOUNTER (OUTPATIENT)
Facility: HOSPITAL | Age: 80
Discharge: HOME | End: 2025-07-21
Attending: FAMILY MEDICINE
Payer: MEDICARE

## 2025-07-18 DIAGNOSIS — Z12.31 VISIT FOR SCREENING MAMMOGRAM: ICD-10-CM

## 2025-07-18 PROCEDURE — 77063 BREAST TOMOSYNTHESIS BI: CPT

## 2025-09-02 DIAGNOSIS — E83.52 HYPERCALCEMIA: ICD-10-CM

## 2025-09-02 DIAGNOSIS — E21.3 HYPERPARATHYROIDISM: Primary | ICD-10-CM

## 2025-09-02 DIAGNOSIS — E04.1 LEFT THYROID NODULE: ICD-10-CM

## (undated) DEVICE — Device

## (undated) DEVICE — BASIN EMSIS 16OZ GRAPHITE PLAS KID SHP MOLD GRAD FOR ORAL

## (undated) DEVICE — NEONATAL-ADULT SPO2 SENSOR: Brand: NELLCOR

## (undated) DEVICE — Z DISCONTINUED PER MEDLINE LINE GAS SAMPLING O2/CO2 LNG AD 13 FT NSL W/ TBNG FILTERLINE

## (undated) DEVICE — 1200 GUARD II KIT W/5MM TUBE W/O VAC TUBE: Brand: GUARDIAN

## (undated) DEVICE — RADIFOCUS OPTITORQUE ANGIOGRAPHIC CATHETER: Brand: OPTITORQUE

## (undated) DEVICE — TOWEL 4 PLY TISS 19X30 SUE WHT

## (undated) DEVICE — SYR 10ML LUER LOK 1/5ML GRAD --

## (undated) DEVICE — TUBING PRSS MON L6IN PVC M FEM CONN

## (undated) DEVICE — NON-REM POLYHESIVE PATIENT RETURN ELECTRODE: Brand: VALLEYLAB

## (undated) DEVICE — TRAP,MUCUS SPECIMEN, 80CC: Brand: MEDLINE

## (undated) DEVICE — PACK PROCEDURE SURG HRT CATH

## (undated) DEVICE — HI-TORQUE VERSACORE FLOPPY GUIDE WIRE SYSTEM 145 CM: Brand: HI-TORQUE VERSACORE

## (undated) DEVICE — SET ADMIN 16ML TBNG L100IN 2 Y INJ SITE IV PIGGY BK DISP

## (undated) DEVICE — 3M™ TEGADERM™ TRANSPARENT FILM DRESSING FRAME STYLE, 1626W, 4 IN X 4-3/4 IN (10 CM X 12 CM), 50/CT 4CT/CASE: Brand: 3M™ TEGADERM™

## (undated) DEVICE — SYR 3ML LL TIP 1/10ML GRAD --

## (undated) DEVICE — HYPODERMIC SAFETY NEEDLE: Brand: MAGELLAN

## (undated) DEVICE — ELECTRODE,RADIOTRANSLUCENT,FOAM,5PK: Brand: MEDLINE

## (undated) DEVICE — SYRINGE KIT,PACKAGED,,150FT,MK 7(ANGIO-ARTERION, 150ML SYR KIT W/QFT,MC)(60729385): Brand: MEDRAD® MARK 7 ARTERION DISPOSABLE SYRINGE 150 ML WITH QUICK FILL TUBE

## (undated) DEVICE — TR BAND RADIAL ARTERY COMPRESSION DEVICE: Brand: TR BAND

## (undated) DEVICE — CATH GUID COR EB30 5FR 100CM -- LAUNCHER

## (undated) DEVICE — NEEDLE HYPO 18GA L1.5IN PNK S STL HUB POLYPR SHLD REG BVL

## (undated) DEVICE — SYRINGE ANGIO 10 CC BRL STD PRNT POLYCARB LT BLU MEDALLION

## (undated) DEVICE — GLIDESHEATH SLENDER ACCESS KIT: Brand: GLIDESHEATH SLENDER

## (undated) DEVICE — SPLINT WR POS F/ARTERIAL ACC -- BX/10

## (undated) DEVICE — PROVE COVER: Brand: UNBRANDED

## (undated) DEVICE — RADIFOCUS GLIDEWIRE: Brand: GLIDEWIRE

## (undated) DEVICE — CATHETER ANGIO PIG AD 5 FRX110 CM 5 SP PERFORMA

## (undated) DEVICE — STRAINER URIN CALC RNL MSH -- CONVERT TO ITEM 357634

## (undated) DEVICE — CATH IV AUTOGRD BC PNK 20GA 25 -- INSYTE

## (undated) DEVICE — SNARE ENDOSCP M L240CM W27MM SHTH DIA2.4MM CHN 2.8MM OVL

## (undated) DEVICE — CONTAINER SPEC 20 ML LID NEUT BUFF FORMALIN 10 % POLYPR STS

## (undated) DEVICE — GUIDEWIRE VASC L260CM 0.035IN J TIP L3MM PTFE FIX COR NAMIC

## (undated) DEVICE — SOLIDIFIER FLD 2OZ 1500CC N DISINF IN BTL DISP SAFESORB